# Patient Record
Sex: MALE | Race: WHITE | Employment: UNEMPLOYED | URBAN - METROPOLITAN AREA
[De-identification: names, ages, dates, MRNs, and addresses within clinical notes are randomized per-mention and may not be internally consistent; named-entity substitution may affect disease eponyms.]

---

## 2019-02-06 ENCOUNTER — HOSPITAL ENCOUNTER (OUTPATIENT)
Age: 54
Setting detail: SPECIMEN
Discharge: HOME | End: 2019-02-06
Payer: COMMERCIAL

## 2019-02-06 LAB
ALBUMIN SERPL-MCNC: 4.4 GM/DL (ref 3.4–5)
ALP BLD-CCNC: 128 IU/L (ref 40–128)
ALT SERPL-CCNC: 14 U/L (ref 10–40)
ANION GAP SERPL CALCULATED.3IONS-SCNC: 13 MMOL/L (ref 4–16)
AST SERPL-CCNC: 17 IU/L (ref 15–37)
BASOPHILS ABSOLUTE: 0.1 K/CU MM
BASOPHILS RELATIVE PERCENT: 1.7 % (ref 0–1)
BILIRUB SERPL-MCNC: 0.2 MG/DL (ref 0–1)
BUN BLDV-MCNC: 18 MG/DL (ref 6–23)
CALCIUM SERPL-MCNC: 9.4 MG/DL (ref 8.3–10.6)
CHLORIDE BLD-SCNC: 106 MMOL/L (ref 99–110)
CHOLESTEROL: 137 MG/DL
CO2: 26 MMOL/L (ref 21–32)
CREAT SERPL-MCNC: 1 MG/DL (ref 0.9–1.3)
DIFFERENTIAL TYPE: ABNORMAL
EOSINOPHILS ABSOLUTE: 1 K/CU MM
EOSINOPHILS RELATIVE PERCENT: 12.6 % (ref 0–3)
GFR AFRICAN AMERICAN: >60 ML/MIN/1.73M2
GFR NON-AFRICAN AMERICAN: >60 ML/MIN/1.73M2
GLUCOSE BLD-MCNC: 75 MG/DL (ref 70–99)
HCT VFR BLD CALC: 49.4 % (ref 42–52)
HDLC SERPL-MCNC: 50 MG/DL
HEMOGLOBIN: 16 GM/DL (ref 13.5–18)
IMMATURE NEUTROPHIL %: 0.3 % (ref 0–0.43)
LDL CHOLESTEROL DIRECT: 85 MG/DL
LYMPHOCYTES ABSOLUTE: 2.7 K/CU MM
LYMPHOCYTES RELATIVE PERCENT: 36 % (ref 24–44)
MCH RBC QN AUTO: 34 PG (ref 27–31)
MCHC RBC AUTO-ENTMCNC: 32.4 % (ref 32–36)
MCV RBC AUTO: 104.9 FL (ref 78–100)
MONOCYTES ABSOLUTE: 0.9 K/CU MM
MONOCYTES RELATIVE PERCENT: 12.5 % (ref 0–4)
NUCLEATED RBC %: 0 %
PDW BLD-RTO: 11.4 % (ref 11.7–14.9)
PLATELET # BLD: 256 K/CU MM (ref 140–440)
PMV BLD AUTO: 9.4 FL (ref 7.5–11.1)
POTASSIUM SERPL-SCNC: 4.3 MMOL/L (ref 3.5–5.1)
RBC # BLD: 4.71 M/CU MM (ref 4.6–6.2)
SEGMENTED NEUTROPHILS ABSOLUTE COUNT: 2.8 K/CU MM
SEGMENTED NEUTROPHILS RELATIVE PERCENT: 36.9 % (ref 36–66)
SODIUM BLD-SCNC: 145 MMOL/L (ref 135–145)
TOTAL CK: 75 IU/L (ref 38–174)
TOTAL IMMATURE NEUTOROPHIL: 0.02 K/CU MM
TOTAL NUCLEATED RBC: 0 K/CU MM
TOTAL PROTEIN: 6.8 GM/DL (ref 6.4–8.2)
TRIGL SERPL-MCNC: 106 MG/DL
WBC # BLD: 7.5 K/CU MM (ref 4–10.5)

## 2019-02-06 PROCEDURE — 82550 ASSAY OF CK (CPK): CPT

## 2019-02-06 PROCEDURE — 83721 ASSAY OF BLOOD LIPOPROTEIN: CPT

## 2019-02-06 PROCEDURE — 36415 COLL VENOUS BLD VENIPUNCTURE: CPT

## 2019-02-06 PROCEDURE — 80061 LIPID PANEL: CPT

## 2019-02-06 PROCEDURE — 85025 COMPLETE CBC W/AUTO DIFF WBC: CPT

## 2019-02-06 PROCEDURE — 80053 COMPREHEN METABOLIC PANEL: CPT

## 2019-02-17 ENCOUNTER — HOSPITAL ENCOUNTER (INPATIENT)
Age: 54
LOS: 3 days | Discharge: HOME HEALTH | DRG: 300 | End: 2019-02-20
Attending: EMERGENCY MEDICINE | Admitting: INTERNAL MEDICINE
Payer: MEDICARE

## 2019-02-17 ENCOUNTER — APPOINTMENT (OUTPATIENT)
Dept: ULTRASOUND IMAGING | Age: 54
DRG: 300 | End: 2019-02-17
Payer: MEDICARE

## 2019-02-17 DIAGNOSIS — I82.403 LEG DVT (DEEP VENOUS THROMBOEMBOLISM), ACUTE, BILATERAL (HCC): Primary | ICD-10-CM

## 2019-02-17 PROBLEM — I82.433 ACUTE DEEP VEIN THROMBOSIS (DVT) OF POPLITEAL VEIN OF BOTH LOWER EXTREMITIES (HCC): Status: ACTIVE | Noted: 2019-02-17

## 2019-02-17 PROBLEM — I82.4Y3 DVT, LOWER EXTREMITY, PROXIMAL, ACUTE, BILATERAL (HCC): Status: ACTIVE | Noted: 2019-02-17

## 2019-02-17 LAB
ALBUMIN SERPL-MCNC: 3.7 GM/DL (ref 3.4–5)
ALP BLD-CCNC: 164 IU/L (ref 40–129)
ALT SERPL-CCNC: 23 U/L (ref 10–40)
ANION GAP SERPL CALCULATED.3IONS-SCNC: 12 MMOL/L (ref 4–16)
APTT: 136.4 SECONDS (ref 21.2–33)
APTT: 31.3 SECONDS (ref 21.2–33)
AST SERPL-CCNC: 24 IU/L (ref 15–37)
BASOPHILS ABSOLUTE: 0.1 K/CU MM
BASOPHILS RELATIVE PERCENT: 0.8 % (ref 0–1)
BILIRUB SERPL-MCNC: 0.2 MG/DL (ref 0–1)
BUN BLDV-MCNC: 16 MG/DL (ref 6–23)
CALCIUM SERPL-MCNC: 8.6 MG/DL (ref 8.3–10.6)
CHLORIDE BLD-SCNC: 100 MMOL/L (ref 99–110)
CO2: 23 MMOL/L (ref 21–32)
CREAT SERPL-MCNC: 0.8 MG/DL (ref 0.9–1.3)
DIFFERENTIAL TYPE: ABNORMAL
EOSINOPHILS ABSOLUTE: 0.6 K/CU MM
EOSINOPHILS RELATIVE PERCENT: 7.2 % (ref 0–3)
GFR AFRICAN AMERICAN: >60 ML/MIN/1.73M2
GFR NON-AFRICAN AMERICAN: >60 ML/MIN/1.73M2
GLUCOSE BLD-MCNC: 96 MG/DL (ref 70–99)
HCT VFR BLD CALC: 42.1 % (ref 42–52)
HEMOGLOBIN: 14.1 GM/DL (ref 13.5–18)
IMMATURE NEUTROPHIL %: 0.3 % (ref 0–0.43)
INR BLD: 1.06 INDEX
LYMPHOCYTES ABSOLUTE: 3 K/CU MM
LYMPHOCYTES RELATIVE PERCENT: 34.1 % (ref 24–44)
MCH RBC QN AUTO: 33.4 PG (ref 27–31)
MCHC RBC AUTO-ENTMCNC: 33.5 % (ref 32–36)
MCV RBC AUTO: 99.8 FL (ref 78–100)
MONOCYTES ABSOLUTE: 1 K/CU MM
MONOCYTES RELATIVE PERCENT: 11.5 % (ref 0–4)
NUCLEATED RBC %: 0 %
PDW BLD-RTO: 11.4 % (ref 11.7–14.9)
PLATELET # BLD: 244 K/CU MM (ref 140–440)
PMV BLD AUTO: 9 FL (ref 7.5–11.1)
POTASSIUM SERPL-SCNC: 3.9 MMOL/L (ref 3.5–5.1)
PROTHROMBIN TIME: 12.1 SECONDS (ref 9.12–12.5)
RBC # BLD: 4.22 M/CU MM (ref 4.6–6.2)
SEGMENTED NEUTROPHILS ABSOLUTE COUNT: 4 K/CU MM
SEGMENTED NEUTROPHILS RELATIVE PERCENT: 46.1 % (ref 36–66)
SODIUM BLD-SCNC: 135 MMOL/L (ref 135–145)
TOTAL IMMATURE NEUTOROPHIL: 0.03 K/CU MM
TOTAL NUCLEATED RBC: 0 K/CU MM
TOTAL PROTEIN: 7.1 GM/DL (ref 6.4–8.2)
WBC # BLD: 8.7 K/CU MM (ref 4–10.5)

## 2019-02-17 PROCEDURE — 99285 EMERGENCY DEPT VISIT HI MDM: CPT

## 2019-02-17 PROCEDURE — 85025 COMPLETE CBC W/AUTO DIFF WBC: CPT

## 2019-02-17 PROCEDURE — 1200000000 HC SEMI PRIVATE

## 2019-02-17 PROCEDURE — 96365 THER/PROPH/DIAG IV INF INIT: CPT

## 2019-02-17 PROCEDURE — 96366 THER/PROPH/DIAG IV INF ADDON: CPT

## 2019-02-17 PROCEDURE — 85730 THROMBOPLASTIN TIME PARTIAL: CPT

## 2019-02-17 PROCEDURE — 94761 N-INVAS EAR/PLS OXIMETRY MLT: CPT

## 2019-02-17 PROCEDURE — 2580000003 HC RX 258: Performed by: INTERNAL MEDICINE

## 2019-02-17 PROCEDURE — 93970 EXTREMITY STUDY: CPT

## 2019-02-17 PROCEDURE — G0378 HOSPITAL OBSERVATION PER HR: HCPCS

## 2019-02-17 PROCEDURE — 85610 PROTHROMBIN TIME: CPT

## 2019-02-17 PROCEDURE — 36415 COLL VENOUS BLD VENIPUNCTURE: CPT

## 2019-02-17 PROCEDURE — 6360000002 HC RX W HCPCS: Performed by: EMERGENCY MEDICINE

## 2019-02-17 PROCEDURE — 6370000000 HC RX 637 (ALT 250 FOR IP): Performed by: INTERNAL MEDICINE

## 2019-02-17 PROCEDURE — 80053 COMPREHEN METABOLIC PANEL: CPT

## 2019-02-17 RX ORDER — M-VIT,TX,IRON,MINS/CALC/FOLIC 27MG-0.4MG
1 TABLET ORAL DAILY
Status: DISCONTINUED | OUTPATIENT
Start: 2019-02-18 | End: 2019-02-20 | Stop reason: HOSPADM

## 2019-02-17 RX ORDER — SODIUM CHLORIDE 0.9 % (FLUSH) 0.9 %
10 SYRINGE (ML) INJECTION EVERY 12 HOURS SCHEDULED
Status: DISCONTINUED | OUTPATIENT
Start: 2019-02-17 | End: 2019-02-20 | Stop reason: HOSPADM

## 2019-02-17 RX ORDER — CLONAZEPAM 0.5 MG/1
0.5 TABLET ORAL 4 TIMES DAILY PRN
Status: DISCONTINUED | OUTPATIENT
Start: 2019-02-17 | End: 2019-02-20 | Stop reason: HOSPADM

## 2019-02-17 RX ORDER — BUSPIRONE HYDROCHLORIDE 7.5 MG/1
15 TABLET ORAL 3 TIMES DAILY
Status: DISCONTINUED | OUTPATIENT
Start: 2019-02-17 | End: 2019-02-20

## 2019-02-17 RX ORDER — GEMFIBROZIL 600 MG/1
600 TABLET, FILM COATED ORAL
Status: DISCONTINUED | OUTPATIENT
Start: 2019-02-17 | End: 2019-02-20 | Stop reason: HOSPADM

## 2019-02-17 RX ORDER — PHENYTOIN 125 MG/5ML
100 SUSPENSION ORAL 2 TIMES DAILY
Status: DISCONTINUED | OUTPATIENT
Start: 2019-02-17 | End: 2019-02-20

## 2019-02-17 RX ORDER — ONDANSETRON 2 MG/ML
4 INJECTION INTRAMUSCULAR; INTRAVENOUS EVERY 6 HOURS PRN
Status: DISCONTINUED | OUTPATIENT
Start: 2019-02-17 | End: 2019-02-20 | Stop reason: HOSPADM

## 2019-02-17 RX ORDER — LAMOTRIGINE 100 MG/1
100 TABLET ORAL DAILY
Status: DISCONTINUED | OUTPATIENT
Start: 2019-02-18 | End: 2019-02-20 | Stop reason: HOSPADM

## 2019-02-17 RX ORDER — FAMOTIDINE 20 MG/1
20 TABLET, FILM COATED ORAL DAILY
Status: DISCONTINUED | OUTPATIENT
Start: 2019-02-18 | End: 2019-02-20 | Stop reason: HOSPADM

## 2019-02-17 RX ORDER — HEPARIN SODIUM 1000 [USP'U]/ML
40 INJECTION, SOLUTION INTRAVENOUS; SUBCUTANEOUS PRN
Status: DISCONTINUED | OUTPATIENT
Start: 2019-02-18 | End: 2019-02-20

## 2019-02-17 RX ORDER — PROPRANOLOL HYDROCHLORIDE 80 MG/1
80 CAPSULE, EXTENDED RELEASE ORAL DAILY
Status: DISCONTINUED | OUTPATIENT
Start: 2019-02-18 | End: 2019-02-20 | Stop reason: HOSPADM

## 2019-02-17 RX ORDER — HEPARIN SODIUM 10000 [USP'U]/100ML
18 INJECTION, SOLUTION INTRAVENOUS CONTINUOUS
Status: DISCONTINUED | OUTPATIENT
Start: 2019-02-17 | End: 2019-02-20

## 2019-02-17 RX ORDER — HEPARIN SODIUM 1000 [USP'U]/ML
80 INJECTION, SOLUTION INTRAVENOUS; SUBCUTANEOUS ONCE
Status: COMPLETED | OUTPATIENT
Start: 2019-02-17 | End: 2019-02-17

## 2019-02-17 RX ORDER — CLOPIDOGREL BISULFATE 75 MG/1
75 TABLET ORAL DAILY
Status: DISCONTINUED | OUTPATIENT
Start: 2019-02-18 | End: 2019-02-20 | Stop reason: HOSPADM

## 2019-02-17 RX ORDER — FLUVOXAMINE MALEATE 50 MG/1
100 TABLET, COATED ORAL 2 TIMES DAILY
Status: DISCONTINUED | OUTPATIENT
Start: 2019-02-17 | End: 2019-02-20

## 2019-02-17 RX ORDER — SODIUM CHLORIDE 0.9 % (FLUSH) 0.9 %
10 SYRINGE (ML) INJECTION PRN
Status: DISCONTINUED | OUTPATIENT
Start: 2019-02-17 | End: 2019-02-20 | Stop reason: HOSPADM

## 2019-02-17 RX ORDER — WARFARIN SODIUM 2.5 MG/1
2.5 TABLET ORAL DAILY
Status: DISCONTINUED | OUTPATIENT
Start: 2019-02-17 | End: 2019-02-18

## 2019-02-17 RX ORDER — NICOTINE 21 MG/24HR
1 PATCH, TRANSDERMAL 24 HOURS TRANSDERMAL DAILY
Status: DISCONTINUED | OUTPATIENT
Start: 2019-02-17 | End: 2019-02-20 | Stop reason: HOSPADM

## 2019-02-17 RX ORDER — HEPARIN SODIUM 1000 [USP'U]/ML
80 INJECTION, SOLUTION INTRAVENOUS; SUBCUTANEOUS PRN
Status: DISCONTINUED | OUTPATIENT
Start: 2019-02-18 | End: 2019-02-20

## 2019-02-17 RX ADMIN — BUSPIRONE HYDROCHLORIDE 15 MG: 7.5 TABLET ORAL at 20:58

## 2019-02-17 RX ADMIN — HEPARIN SODIUM AND DEXTROSE 18 UNITS/KG/HR: 10000; 5 INJECTION INTRAVENOUS at 16:26

## 2019-02-17 RX ADMIN — WARFARIN SODIUM 2.5 MG: 2.5 TABLET ORAL at 18:28

## 2019-02-17 RX ADMIN — FLUVOXAMINE MALEATE 100 MG: 50 TABLET, FILM COATED ORAL at 20:58

## 2019-02-17 RX ADMIN — GEMFIBROZIL 600 MG: 600 TABLET ORAL at 18:28

## 2019-02-17 RX ADMIN — SODIUM CHLORIDE, PRESERVATIVE FREE 10 ML: 5 INJECTION INTRAVENOUS at 20:58

## 2019-02-17 RX ADMIN — HEPARIN SODIUM 7440 UNITS: 1000 INJECTION, SOLUTION INTRAVENOUS; SUBCUTANEOUS at 16:26

## 2019-02-17 RX ADMIN — PHENYTOIN 100 MG: 125 SUSPENSION ORAL at 20:58

## 2019-02-17 ASSESSMENT — PAIN DESCRIPTION - LOCATION: LOCATION: FOOT

## 2019-02-17 ASSESSMENT — PAIN SCALES - GENERAL
PAINLEVEL_OUTOF10: 0
PAINLEVEL_OUTOF10: 7
PAINLEVEL_OUTOF10: 0

## 2019-02-17 ASSESSMENT — PAIN DESCRIPTION - ORIENTATION: ORIENTATION: RIGHT;LEFT

## 2019-02-18 LAB
ANION GAP SERPL CALCULATED.3IONS-SCNC: 12 MMOL/L (ref 4–16)
APTT: 79.5 SECONDS (ref 21.2–33)
APTT: 86 SECONDS (ref 21.2–33)
APTT: 89 SECONDS (ref 21.2–33)
BASOPHILS ABSOLUTE: 0.1 K/CU MM
BASOPHILS RELATIVE PERCENT: 1 % (ref 0–1)
BUN BLDV-MCNC: 16 MG/DL (ref 6–23)
CALCIUM SERPL-MCNC: 8.3 MG/DL (ref 8.3–10.6)
CHLORIDE BLD-SCNC: 100 MMOL/L (ref 99–110)
CO2: 24 MMOL/L (ref 21–32)
CREAT SERPL-MCNC: 0.9 MG/DL (ref 0.9–1.3)
DIFFERENTIAL TYPE: ABNORMAL
EOSINOPHILS ABSOLUTE: 0.7 K/CU MM
EOSINOPHILS RELATIVE PERCENT: 8.7 % (ref 0–3)
FIBRINOGEN LEVEL: 504 MG/DL (ref 196.9–442.1)
GFR AFRICAN AMERICAN: >60 ML/MIN/1.73M2
GFR NON-AFRICAN AMERICAN: >60 ML/MIN/1.73M2
GLUCOSE BLD-MCNC: 96 MG/DL (ref 70–99)
HCT VFR BLD CALC: 38.6 % (ref 42–52)
HEMOGLOBIN: 13.4 GM/DL (ref 13.5–18)
HOMOCYSTEINE: 9 UMOL/L (ref 0–10)
IMMATURE NEUTROPHIL %: 0.2 % (ref 0–0.43)
INR BLD: 1.08 INDEX
INR BLD: 1.09 INDEX
LYMPHOCYTES ABSOLUTE: 3.6 K/CU MM
LYMPHOCYTES RELATIVE PERCENT: 42.7 % (ref 24–44)
MAGNESIUM: 2 MG/DL (ref 1.8–2.4)
MCH RBC QN AUTO: 33.3 PG (ref 27–31)
MCHC RBC AUTO-ENTMCNC: 34.7 % (ref 32–36)
MCV RBC AUTO: 96 FL (ref 78–100)
MONOCYTES ABSOLUTE: 0.8 K/CU MM
MONOCYTES RELATIVE PERCENT: 9.4 % (ref 0–4)
NUCLEATED RBC %: 0 %
PDW BLD-RTO: 11.2 % (ref 11.7–14.9)
PLATELET # BLD: 263 K/CU MM (ref 140–440)
PMV BLD AUTO: 9.2 FL (ref 7.5–11.1)
POTASSIUM SERPL-SCNC: 3.4 MMOL/L (ref 3.5–5.1)
PROTHROMBIN TIME: 12.3 SECONDS (ref 9.12–12.5)
PROTHROMBIN TIME: 12.4 SECONDS (ref 9.12–12.5)
RBC # BLD: 4.02 M/CU MM (ref 4.6–6.2)
SEGMENTED NEUTROPHILS ABSOLUTE COUNT: 3.2 K/CU MM
SEGMENTED NEUTROPHILS RELATIVE PERCENT: 38 % (ref 36–66)
SODIUM BLD-SCNC: 136 MMOL/L (ref 135–145)
TOTAL IMMATURE NEUTOROPHIL: 0.02 K/CU MM
TOTAL NUCLEATED RBC: 0 K/CU MM
WBC # BLD: 8.4 K/CU MM (ref 4–10.5)

## 2019-02-18 PROCEDURE — 85300 ANTITHROMBIN III ACTIVITY: CPT

## 2019-02-18 PROCEDURE — 36415 COLL VENOUS BLD VENIPUNCTURE: CPT

## 2019-02-18 PROCEDURE — 80048 BASIC METABOLIC PNL TOTAL CA: CPT

## 2019-02-18 PROCEDURE — 81241 F5 GENE: CPT

## 2019-02-18 PROCEDURE — 83090 ASSAY OF HOMOCYSTEINE: CPT

## 2019-02-18 PROCEDURE — 1200000000 HC SEMI PRIVATE

## 2019-02-18 PROCEDURE — G0378 HOSPITAL OBSERVATION PER HR: HCPCS

## 2019-02-18 PROCEDURE — 85240 CLOT FACTOR VIII AHG 1 STAGE: CPT

## 2019-02-18 PROCEDURE — 6360000002 HC RX W HCPCS: Performed by: EMERGENCY MEDICINE

## 2019-02-18 PROCEDURE — 6370000000 HC RX 637 (ALT 250 FOR IP): Performed by: INTERNAL MEDICINE

## 2019-02-18 PROCEDURE — 85025 COMPLETE CBC W/AUTO DIFF WBC: CPT

## 2019-02-18 PROCEDURE — 81291 MTHFR GENE: CPT

## 2019-02-18 PROCEDURE — 85303 CLOT INHIBIT PROT C ACTIVITY: CPT

## 2019-02-18 PROCEDURE — 85610 PROTHROMBIN TIME: CPT

## 2019-02-18 PROCEDURE — 81240 F2 GENE: CPT

## 2019-02-18 PROCEDURE — 85730 THROMBOPLASTIN TIME PARTIAL: CPT

## 2019-02-18 PROCEDURE — 85305 CLOT INHIBIT PROT S TOTAL: CPT

## 2019-02-18 PROCEDURE — 86148 ANTI-PHOSPHOLIPID ANTIBODY: CPT

## 2019-02-18 PROCEDURE — 83735 ASSAY OF MAGNESIUM: CPT

## 2019-02-18 PROCEDURE — 85384 FIBRINOGEN ACTIVITY: CPT

## 2019-02-18 PROCEDURE — 96366 THER/PROPH/DIAG IV INF ADDON: CPT

## 2019-02-18 PROCEDURE — 86147 CARDIOLIPIN ANTIBODY EA IG: CPT

## 2019-02-18 RX ORDER — WARFARIN SODIUM 7.5 MG/1
7.5 TABLET ORAL DAILY
Status: DISCONTINUED | OUTPATIENT
Start: 2019-02-18 | End: 2019-02-20

## 2019-02-18 RX ORDER — POTASSIUM CHLORIDE 750 MG/1
40 TABLET, FILM COATED, EXTENDED RELEASE ORAL ONCE
Status: COMPLETED | OUTPATIENT
Start: 2019-02-18 | End: 2019-02-18

## 2019-02-18 RX ADMIN — GEMFIBROZIL 600 MG: 600 TABLET ORAL at 07:33

## 2019-02-18 RX ADMIN — PROPRANOLOL HYDROCHLORIDE 80 MG: 80 CAPSULE, EXTENDED RELEASE ORAL at 10:47

## 2019-02-18 RX ADMIN — FLUVOXAMINE MALEATE 100 MG: 50 TABLET, FILM COATED ORAL at 10:47

## 2019-02-18 RX ADMIN — PHENYTOIN 100 MG: 125 SUSPENSION ORAL at 22:20

## 2019-02-18 RX ADMIN — WARFARIN SODIUM 7.5 MG: 7.5 TABLET ORAL at 17:59

## 2019-02-18 RX ADMIN — BUSPIRONE HYDROCHLORIDE 15 MG: 7.5 TABLET ORAL at 10:46

## 2019-02-18 RX ADMIN — MULTIPLE VITAMINS W/ MINERALS TAB 1 TABLET: TAB at 10:47

## 2019-02-18 RX ADMIN — HEPARIN SODIUM AND DEXTROSE 15.05 UNITS/KG/HR: 10000; 5 INJECTION INTRAVENOUS at 23:32

## 2019-02-18 RX ADMIN — HEPARIN SODIUM AND DEXTROSE 15 UNITS/KG/HR: 10000; 5 INJECTION INTRAVENOUS at 07:03

## 2019-02-18 RX ADMIN — LAMOTRIGINE 100 MG: 100 TABLET ORAL at 10:47

## 2019-02-18 RX ADMIN — FAMOTIDINE 20 MG: 20 TABLET, FILM COATED ORAL at 10:46

## 2019-02-18 RX ADMIN — CLOPIDOGREL BISULFATE 75 MG: 75 TABLET, FILM COATED ORAL at 10:47

## 2019-02-18 RX ADMIN — POTASSIUM CHLORIDE 40 MEQ: 750 TABLET, FILM COATED, EXTENDED RELEASE ORAL at 10:47

## 2019-02-18 RX ADMIN — GEMFIBROZIL 600 MG: 600 TABLET ORAL at 15:25

## 2019-02-18 RX ADMIN — BUSPIRONE HYDROCHLORIDE 15 MG: 7.5 TABLET ORAL at 15:25

## 2019-02-18 RX ADMIN — PHENYTOIN 100 MG: 125 SUSPENSION ORAL at 10:46

## 2019-02-18 RX ADMIN — BUSPIRONE HYDROCHLORIDE 15 MG: 7.5 TABLET ORAL at 22:20

## 2019-02-18 ASSESSMENT — PAIN SCALES - GENERAL: PAINLEVEL_OUTOF10: 0

## 2019-02-19 LAB
APTT: 60.2 SECONDS (ref 21.2–33)
INR BLD: 1.14 INDEX
PHENYTOIN LEVEL: 5.2 UG/ML (ref 10–20)
PLATELET # BLD: 288 K/CU MM (ref 140–440)
PROTHROMBIN TIME: 13.2 SECONDS (ref 9.12–12.5)

## 2019-02-19 PROCEDURE — 6360000002 HC RX W HCPCS: Performed by: EMERGENCY MEDICINE

## 2019-02-19 PROCEDURE — 6360000002 HC RX W HCPCS

## 2019-02-19 PROCEDURE — 96366 THER/PROPH/DIAG IV INF ADDON: CPT

## 2019-02-19 PROCEDURE — 6370000000 HC RX 637 (ALT 250 FOR IP): Performed by: HOSPITALIST

## 2019-02-19 PROCEDURE — 1200000000 HC SEMI PRIVATE

## 2019-02-19 PROCEDURE — 94761 N-INVAS EAR/PLS OXIMETRY MLT: CPT

## 2019-02-19 PROCEDURE — 6370000000 HC RX 637 (ALT 250 FOR IP): Performed by: INTERNAL MEDICINE

## 2019-02-19 PROCEDURE — 85049 AUTOMATED PLATELET COUNT: CPT

## 2019-02-19 PROCEDURE — 85610 PROTHROMBIN TIME: CPT

## 2019-02-19 PROCEDURE — 80185 ASSAY OF PHENYTOIN TOTAL: CPT

## 2019-02-19 PROCEDURE — 36415 COLL VENOUS BLD VENIPUNCTURE: CPT

## 2019-02-19 PROCEDURE — G0378 HOSPITAL OBSERVATION PER HR: HCPCS

## 2019-02-19 PROCEDURE — 85730 THROMBOPLASTIN TIME PARTIAL: CPT

## 2019-02-19 RX ORDER — LORAZEPAM 2 MG/ML
1 INJECTION INTRAMUSCULAR EVERY 4 HOURS PRN
Status: DISCONTINUED | OUTPATIENT
Start: 2019-02-19 | End: 2019-02-20 | Stop reason: HOSPADM

## 2019-02-19 RX ORDER — LORAZEPAM 2 MG/ML
INJECTION INTRAMUSCULAR
Status: COMPLETED
Start: 2019-02-19 | End: 2019-02-19

## 2019-02-19 RX ORDER — LEVETIRACETAM 500 MG/1
500 TABLET ORAL 2 TIMES DAILY
Status: DISCONTINUED | OUTPATIENT
Start: 2019-02-19 | End: 2019-02-20

## 2019-02-19 RX ADMIN — CLOPIDOGREL BISULFATE 75 MG: 75 TABLET, FILM COATED ORAL at 09:29

## 2019-02-19 RX ADMIN — FLUVOXAMINE MALEATE 100 MG: 50 TABLET, FILM COATED ORAL at 09:33

## 2019-02-19 RX ADMIN — PROPRANOLOL HYDROCHLORIDE 80 MG: 80 CAPSULE, EXTENDED RELEASE ORAL at 09:32

## 2019-02-19 RX ADMIN — LEVETIRACETAM 500 MG: 500 TABLET ORAL at 23:55

## 2019-02-19 RX ADMIN — LORAZEPAM 1 MG: 2 INJECTION INTRAMUSCULAR; INTRAVENOUS at 20:47

## 2019-02-19 RX ADMIN — FLUVOXAMINE MALEATE 100 MG: 50 TABLET, FILM COATED ORAL at 21:08

## 2019-02-19 RX ADMIN — MULTIPLE VITAMINS W/ MINERALS TAB 1 TABLET: TAB at 09:29

## 2019-02-19 RX ADMIN — LORAZEPAM 1 MG: 2 INJECTION, SOLUTION INTRAMUSCULAR; INTRAVENOUS at 20:47

## 2019-02-19 RX ADMIN — FLUVOXAMINE MALEATE 100 MG: 50 TABLET, FILM COATED ORAL at 00:24

## 2019-02-19 RX ADMIN — FAMOTIDINE 20 MG: 20 TABLET, FILM COATED ORAL at 09:29

## 2019-02-19 RX ADMIN — PHENYTOIN 100 MG: 125 SUSPENSION ORAL at 09:32

## 2019-02-19 RX ADMIN — LAMOTRIGINE 100 MG: 100 TABLET ORAL at 09:29

## 2019-02-19 RX ADMIN — GEMFIBROZIL 600 MG: 600 TABLET ORAL at 17:08

## 2019-02-19 RX ADMIN — BUSPIRONE HYDROCHLORIDE 15 MG: 7.5 TABLET ORAL at 14:53

## 2019-02-19 RX ADMIN — WARFARIN SODIUM 7.5 MG: 7.5 TABLET ORAL at 17:08

## 2019-02-19 RX ADMIN — GEMFIBROZIL 600 MG: 600 TABLET ORAL at 05:28

## 2019-02-19 RX ADMIN — HEPARIN SODIUM AND DEXTROSE 16.99 UNITS/KG/HR: 10000; 5 INJECTION INTRAVENOUS at 17:09

## 2019-02-19 RX ADMIN — HEPARIN SODIUM AND DEXTROSE 17 UNITS/KG/HR: 10000; 5 INJECTION INTRAVENOUS at 04:33

## 2019-02-19 RX ADMIN — BUSPIRONE HYDROCHLORIDE 15 MG: 7.5 TABLET ORAL at 21:07

## 2019-02-19 RX ADMIN — PHENYTOIN 100 MG: 125 SUSPENSION ORAL at 21:08

## 2019-02-19 RX ADMIN — BUSPIRONE HYDROCHLORIDE 15 MG: 7.5 TABLET ORAL at 09:29

## 2019-02-19 ASSESSMENT — PAIN DESCRIPTION - ORIENTATION
ORIENTATION: RIGHT
ORIENTATION: RIGHT

## 2019-02-19 ASSESSMENT — PAIN DESCRIPTION - LOCATION
LOCATION: LEG
LOCATION: LEG

## 2019-02-19 ASSESSMENT — PAIN SCALES - GENERAL
PAINLEVEL_OUTOF10: 0
PAINLEVEL_OUTOF10: 6

## 2019-02-20 VITALS
SYSTOLIC BLOOD PRESSURE: 117 MMHG | WEIGHT: 206.8 LBS | RESPIRATION RATE: 15 BRPM | TEMPERATURE: 98 F | HEART RATE: 82 BPM | HEIGHT: 72 IN | BODY MASS INDEX: 28.01 KG/M2 | OXYGEN SATURATION: 90 % | DIASTOLIC BLOOD PRESSURE: 79 MMHG

## 2019-02-20 LAB
APTT: 96.1 SECONDS (ref 21.2–33)
FOLATE: >20 NG/ML (ref 3.1–17.5)
INR BLD: 2.51 INDEX
PHENYTOIN LEVEL: 5.5 UG/ML (ref 10–20)
PROTHROMBIN TIME: 28.4 SECONDS (ref 9.12–12.5)
TSH HIGH SENSITIVITY: 2.12 UIU/ML (ref 0.27–4.2)
VITAMIN B-12: 528.3 PG/ML (ref 211–911)

## 2019-02-20 PROCEDURE — 80185 ASSAY OF PHENYTOIN TOTAL: CPT

## 2019-02-20 PROCEDURE — 85610 PROTHROMBIN TIME: CPT

## 2019-02-20 PROCEDURE — 82607 VITAMIN B-12: CPT

## 2019-02-20 PROCEDURE — 36415 COLL VENOUS BLD VENIPUNCTURE: CPT

## 2019-02-20 PROCEDURE — 6370000000 HC RX 637 (ALT 250 FOR IP): Performed by: HOSPITALIST

## 2019-02-20 PROCEDURE — 85730 THROMBOPLASTIN TIME PARTIAL: CPT

## 2019-02-20 PROCEDURE — 6370000000 HC RX 637 (ALT 250 FOR IP): Performed by: INTERNAL MEDICINE

## 2019-02-20 PROCEDURE — G0378 HOSPITAL OBSERVATION PER HR: HCPCS

## 2019-02-20 PROCEDURE — 84443 ASSAY THYROID STIM HORMONE: CPT

## 2019-02-20 PROCEDURE — 2580000003 HC RX 258: Performed by: INTERNAL MEDICINE

## 2019-02-20 PROCEDURE — 82746 ASSAY OF FOLIC ACID SERUM: CPT

## 2019-02-20 RX ORDER — PHENYTOIN 125 MG/5ML
100 SUSPENSION ORAL 3 TIMES DAILY
Status: DISCONTINUED | OUTPATIENT
Start: 2019-02-20 | End: 2019-02-20

## 2019-02-20 RX ORDER — BUSPIRONE HYDROCHLORIDE 10 MG/1
20 TABLET ORAL 3 TIMES DAILY
Status: DISCONTINUED | OUTPATIENT
Start: 2019-02-20 | End: 2019-02-20 | Stop reason: RX

## 2019-02-20 RX ORDER — WARFARIN SODIUM 5 MG/1
5 TABLET ORAL DAILY
Qty: 30 TABLET | Refills: 3 | Status: SHIPPED | OUTPATIENT
Start: 2019-02-20 | End: 2021-03-15

## 2019-02-20 RX ORDER — LEVETIRACETAM 500 MG/1
500 TABLET ORAL 2 TIMES DAILY
Qty: 60 TABLET | Refills: 3 | Status: SHIPPED | OUTPATIENT
Start: 2019-02-20 | End: 2021-03-15

## 2019-02-20 RX ORDER — PHENYTOIN 125 MG/5ML
100 SUSPENSION ORAL 3 TIMES DAILY
Qty: 1 BOTTLE | Refills: 3 | Status: SHIPPED | OUTPATIENT
Start: 2019-02-20 | End: 2021-03-15 | Stop reason: DRUGHIGH

## 2019-02-20 RX ORDER — NICOTINE 21 MG/24HR
1 PATCH, TRANSDERMAL 24 HOURS TRANSDERMAL DAILY
Qty: 30 PATCH | Refills: 3 | Status: SHIPPED | OUTPATIENT
Start: 2019-02-20 | End: 2021-03-15

## 2019-02-20 RX ORDER — PHENYTOIN 125 MG/5ML
200 SUSPENSION ORAL 2 TIMES DAILY
Status: DISCONTINUED | OUTPATIENT
Start: 2019-02-20 | End: 2019-02-20 | Stop reason: HOSPADM

## 2019-02-20 RX ORDER — BUSPIRONE HYDROCHLORIDE 7.5 MG/1
22.5 TABLET ORAL 3 TIMES DAILY
Status: DISCONTINUED | OUTPATIENT
Start: 2019-02-20 | End: 2019-02-20 | Stop reason: HOSPADM

## 2019-02-20 RX ORDER — WARFARIN SODIUM 5 MG/1
5 TABLET ORAL DAILY
Status: DISCONTINUED | OUTPATIENT
Start: 2019-02-20 | End: 2019-02-20 | Stop reason: HOSPADM

## 2019-02-20 RX ORDER — PAROXETINE 10 MG/1
10 TABLET, FILM COATED ORAL DAILY
Status: DISCONTINUED | OUTPATIENT
Start: 2019-02-20 | End: 2019-02-20

## 2019-02-20 RX ORDER — LEVETIRACETAM 500 MG/1
1000 TABLET ORAL 2 TIMES DAILY
Status: DISCONTINUED | OUTPATIENT
Start: 2019-02-20 | End: 2019-02-20 | Stop reason: HOSPADM

## 2019-02-20 RX ORDER — FLUVOXAMINE MALEATE 50 MG/1
125 TABLET, COATED ORAL 2 TIMES DAILY
Status: DISCONTINUED | OUTPATIENT
Start: 2019-02-20 | End: 2019-02-20 | Stop reason: HOSPADM

## 2019-02-20 RX ADMIN — FLUVOXAMINE MALEATE 100 MG: 50 TABLET, FILM COATED ORAL at 10:24

## 2019-02-20 RX ADMIN — SODIUM CHLORIDE, PRESERVATIVE FREE 10 ML: 5 INJECTION INTRAVENOUS at 10:24

## 2019-02-20 RX ADMIN — BUSPIRONE HYDROCHLORIDE 15 MG: 7.5 TABLET ORAL at 10:23

## 2019-02-20 RX ADMIN — PHENYTOIN 100 MG: 125 SUSPENSION ORAL at 10:24

## 2019-02-20 RX ADMIN — GEMFIBROZIL 600 MG: 600 TABLET ORAL at 06:35

## 2019-02-20 RX ADMIN — MULTIPLE VITAMINS W/ MINERALS TAB 1 TABLET: TAB at 10:23

## 2019-02-20 RX ADMIN — PROPRANOLOL HYDROCHLORIDE 80 MG: 80 CAPSULE, EXTENDED RELEASE ORAL at 11:56

## 2019-02-20 RX ADMIN — FAMOTIDINE 20 MG: 20 TABLET, FILM COATED ORAL at 10:23

## 2019-02-20 RX ADMIN — CLOPIDOGREL BISULFATE 75 MG: 75 TABLET, FILM COATED ORAL at 10:23

## 2019-02-20 RX ADMIN — LEVETIRACETAM 500 MG: 500 TABLET ORAL at 10:23

## 2019-02-20 RX ADMIN — LAMOTRIGINE 100 MG: 100 TABLET ORAL at 10:23

## 2019-02-20 ASSESSMENT — PAIN SCALES - GENERAL: PAINLEVEL_OUTOF10: 0

## 2019-02-21 LAB
ANTICARDIOLIPIN IGA ANTIBODY: 0
ANTICARDIOLIPIN IGG ANTIBODY: 1
ANTITHROMBIN ACTIVITY: 111
CARDIOLIPIN AB IGM: 0
FACTOR VIII ACTIVITY: NORMAL % (ref 50–150)
PROTEIN C ACTIVITY: 141 % (ref 70–130)
PROTEIN S ACTIVITY: 61 % (ref 65–140)

## 2019-02-22 LAB
FACTOR V LEIDEN: NORMAL
FACTOR V LEIDEN: NORMAL
MTHFR BY PCR SPECIMEN: NORMAL
MTHFR INTERPRETATION: NORMAL
MTHFR MUTATION A1298C: NORMAL
MTHFR MUTATION C677T: NORMAL
PROTHROMBIN G20210A MUTATION: NORMAL
PROTHROMBIN SPECIMEN SOURCE: NORMAL

## 2019-02-25 LAB
PHOSPHATIDYLSERINE IGA ANTIBODY: 1
PHOSPHATIDYLSERINE IGG ANTIBODY: 3
PHOSPHATIDYLSERINE IGM ANTIBODY: 5

## 2019-03-11 ENCOUNTER — HOSPITAL ENCOUNTER (OUTPATIENT)
Age: 54
Discharge: HOME | End: 2019-03-11
Payer: COMMERCIAL

## 2019-03-11 LAB
INR BLD: 9.88 INDEX
PROTHROMBIN TIME: 110.1 SECONDS (ref 9.12–12.5)

## 2019-03-11 PROCEDURE — 36415 COLL VENOUS BLD VENIPUNCTURE: CPT

## 2019-03-11 PROCEDURE — 85610 PROTHROMBIN TIME: CPT

## 2019-03-12 ENCOUNTER — HOSPITAL ENCOUNTER (EMERGENCY)
Age: 54
Discharge: HOME | End: 2019-03-12
Payer: MEDICARE

## 2019-03-12 VITALS
BODY MASS INDEX: 27.09 KG/M2 | HEIGHT: 72 IN | SYSTOLIC BLOOD PRESSURE: 121 MMHG | OXYGEN SATURATION: 100 % | HEART RATE: 81 BPM | RESPIRATION RATE: 15 BRPM | TEMPERATURE: 98 F | DIASTOLIC BLOOD PRESSURE: 89 MMHG | WEIGHT: 200 LBS

## 2019-03-12 DIAGNOSIS — R79.1 INR (INTERNATIONAL NORMAL RATIO) ABNORMAL: Primary | ICD-10-CM

## 2019-03-12 LAB
ALBUMIN SERPL-MCNC: 4.1 GM/DL (ref 3.4–5)
ALP BLD-CCNC: 142 IU/L (ref 40–129)
ALT SERPL-CCNC: 16 U/L (ref 10–40)
ANION GAP SERPL CALCULATED.3IONS-SCNC: 12 MMOL/L (ref 4–16)
APTT: 108.8 SECONDS (ref 21.2–33)
AST SERPL-CCNC: 22 IU/L (ref 15–37)
BASOPHILS ABSOLUTE: 0.1 K/CU MM
BASOPHILS RELATIVE PERCENT: 1.7 % (ref 0–1)
BILIRUB SERPL-MCNC: 0.3 MG/DL (ref 0–1)
BUN BLDV-MCNC: 19 MG/DL (ref 6–23)
CALCIUM SERPL-MCNC: 8.8 MG/DL (ref 8.3–10.6)
CHLORIDE BLD-SCNC: 106 MMOL/L (ref 99–110)
CO2: 21 MMOL/L (ref 21–32)
CREAT SERPL-MCNC: 1 MG/DL (ref 0.9–1.3)
DIFFERENTIAL TYPE: ABNORMAL
EOSINOPHILS ABSOLUTE: 0.5 K/CU MM
EOSINOPHILS RELATIVE PERCENT: 8.9 % (ref 0–3)
GFR AFRICAN AMERICAN: >60 ML/MIN/1.73M2
GFR NON-AFRICAN AMERICAN: >60 ML/MIN/1.73M2
GLUCOSE BLD-MCNC: 98 MG/DL (ref 70–99)
HCT VFR BLD CALC: 48.4 % (ref 42–52)
HEMOGLOBIN: 15.9 GM/DL (ref 13.5–18)
IMMATURE NEUTROPHIL %: 0.4 % (ref 0–0.43)
INR BLD: >10.1 INDEX
LYMPHOCYTES ABSOLUTE: 2 K/CU MM
LYMPHOCYTES RELATIVE PERCENT: 38 % (ref 24–44)
MCH RBC QN AUTO: 33.3 PG (ref 27–31)
MCHC RBC AUTO-ENTMCNC: 32.9 % (ref 32–36)
MCV RBC AUTO: 101.5 FL (ref 78–100)
MONOCYTES ABSOLUTE: 0.6 K/CU MM
MONOCYTES RELATIVE PERCENT: 11 % (ref 0–4)
NUCLEATED RBC %: 0 %
PDW BLD-RTO: 11.7 % (ref 11.7–14.9)
PLATELET # BLD: 218 K/CU MM (ref 140–440)
PMV BLD AUTO: 9.4 FL (ref 7.5–11.1)
POTASSIUM SERPL-SCNC: 4.3 MMOL/L (ref 3.5–5.1)
PROTHROMBIN TIME: 118.5 SECONDS (ref 9.12–12.5)
RBC # BLD: 4.77 M/CU MM (ref 4.6–6.2)
SEGMENTED NEUTROPHILS ABSOLUTE COUNT: 2.2 K/CU MM
SEGMENTED NEUTROPHILS RELATIVE PERCENT: 40 % (ref 36–66)
SODIUM BLD-SCNC: 139 MMOL/L (ref 135–145)
TOTAL IMMATURE NEUTOROPHIL: 0.02 K/CU MM
TOTAL NUCLEATED RBC: 0 K/CU MM
TOTAL PROTEIN: 7.3 GM/DL (ref 6.4–8.2)
WBC # BLD: 5.4 K/CU MM (ref 4–10.5)

## 2019-03-12 PROCEDURE — 85025 COMPLETE CBC W/AUTO DIFF WBC: CPT

## 2019-03-12 PROCEDURE — 99283 EMERGENCY DEPT VISIT LOW MDM: CPT

## 2019-03-12 PROCEDURE — 85730 THROMBOPLASTIN TIME PARTIAL: CPT

## 2019-03-12 PROCEDURE — 85610 PROTHROMBIN TIME: CPT

## 2019-03-12 PROCEDURE — 36415 COLL VENOUS BLD VENIPUNCTURE: CPT

## 2019-03-12 PROCEDURE — 80053 COMPREHEN METABOLIC PANEL: CPT

## 2019-03-12 PROCEDURE — 6370000000 HC RX 637 (ALT 250 FOR IP): Performed by: PHYSICIAN ASSISTANT

## 2019-03-12 RX ORDER — PHYTONADIONE 5 MG/1
5 TABLET ORAL ONCE
Status: COMPLETED | OUTPATIENT
Start: 2019-03-12 | End: 2019-03-12

## 2019-03-12 RX ADMIN — PHYTONADIONE 5 MG: 5 TABLET ORAL at 12:10

## 2019-04-06 ENCOUNTER — HOSPITAL ENCOUNTER (EMERGENCY)
Age: 54
Discharge: HOME | End: 2019-04-06
Payer: MEDICARE

## 2019-04-06 ENCOUNTER — APPOINTMENT (OUTPATIENT)
Dept: GENERAL RADIOLOGY | Age: 54
End: 2019-04-06
Payer: MEDICARE

## 2019-04-06 VITALS
WEIGHT: 215 LBS | BODY MASS INDEX: 29.12 KG/M2 | HEIGHT: 72 IN | TEMPERATURE: 98.8 F | SYSTOLIC BLOOD PRESSURE: 112 MMHG | DIASTOLIC BLOOD PRESSURE: 86 MMHG | HEART RATE: 84 BPM | OXYGEN SATURATION: 95 % | RESPIRATION RATE: 18 BRPM

## 2019-04-06 DIAGNOSIS — S69.91XA THUMB INJURY, RIGHT, INITIAL ENCOUNTER: Primary | ICD-10-CM

## 2019-04-06 PROCEDURE — 6370000000 HC RX 637 (ALT 250 FOR IP): Performed by: NURSE PRACTITIONER

## 2019-04-06 PROCEDURE — 99283 EMERGENCY DEPT VISIT LOW MDM: CPT

## 2019-04-06 PROCEDURE — 73130 X-RAY EXAM OF HAND: CPT

## 2019-04-06 RX ORDER — ACETAMINOPHEN 500 MG
1000 TABLET ORAL ONCE
Status: COMPLETED | OUTPATIENT
Start: 2019-04-06 | End: 2019-04-06

## 2019-04-06 RX ADMIN — ACETAMINOPHEN 1000 MG: 500 TABLET ORAL at 14:42

## 2019-04-06 ASSESSMENT — PAIN SCALES - GENERAL
PAINLEVEL_OUTOF10: 7
PAINLEVEL_OUTOF10: 7

## 2019-04-06 ASSESSMENT — PAIN DESCRIPTION - FREQUENCY: FREQUENCY: CONTINUOUS

## 2019-04-06 ASSESSMENT — PAIN DESCRIPTION - DESCRIPTORS: DESCRIPTORS: DISCOMFORT;ACHING

## 2019-04-06 ASSESSMENT — PAIN DESCRIPTION - PAIN TYPE: TYPE: ACUTE PAIN

## 2019-04-06 ASSESSMENT — PAIN DESCRIPTION - PROGRESSION: CLINICAL_PROGRESSION: NOT CHANGED

## 2019-04-06 ASSESSMENT — PAIN DESCRIPTION - ORIENTATION: ORIENTATION: RIGHT

## 2019-04-06 ASSESSMENT — PAIN DESCRIPTION - ONSET: ONSET: ON-GOING

## 2019-04-06 ASSESSMENT — PAIN DESCRIPTION - LOCATION: LOCATION: FINGER (COMMENT WHICH ONE)

## 2019-04-06 NOTE — ED PROVIDER NOTES
eMERGENCY dEPARTMENT eNCOUnter      PCP: Juan Alpers, MD    CHIEF COMPLAINT    Chief Complaint   Patient presents with    Hand Injury     shut right thumb in trunk       [facility]    Cory Durbin is a 46 y.o. male who presents with right hand/thumb pain after slamming thumb in trunk just prior to arrival.    Complains of distal numbness, tingling, weakness, and functional deficit. No other pain. He is currently on a blood thinner. Denies hitting his head. Caregiver with patient denies any other injuries as well. REVIEW OF SYSTEMS    General: Denies constitutional symptoms. Cardiac: Denies chest wall injury or chest pain  Pulmonary: Denies chest wall injury or shortness of breath  GI: Denies abdomen injury or abdominal pain  Musculoskeletal:   Denies any other musculoskeletal pain or injuries, including chest wall and back. Skin:  Skin intact. Lymphatics:  No nodules or streaks. See HPI and nursing notes for additional information     PAST MEDICAL & SURGICAL HISTORY    Past Medical History:   Diagnosis Date    [facility] injury ([facility] [address]     GERD (gastroesophageal reflux disease)     Hyperlipidemia     Hypertension     Seizures ([facility] [address]      No past surgical history on file. CURRENT MEDICATIONS    Current Outpatient Rx   Medication Sig Dispense Refill    warfarin (COUMADIN) 5 MG tablet Take 1 tablet by mouth daily 30 tablet 3    levETIRAcetam (KEPPRA) 500 MG tablet Take 1 tablet by mouth 2 times daily 60 tablet 3    phenytoin (DILANTIN) 125 MG/5ML suspension Take 4 mLs by mouth 3 times daily 1 Bottle 3    nicotine (NICODERM CQ) 14 MG/24HR Place 1 patch onto the skin daily 30 patch 3    Multiple Vitamins-Minerals (THERAPEUTIC MULTIVITAMIN-MINERALS) tablet Take 1 tablet by mouth daily      ibuprofen (ADVIL;MOTRIN) 600 MG tablet Take 1 tablet by mouth every 6 hours as needed for Pain 30 tablet 1    busPIRone (BUSPAR) 15 MG tablet Take 15 mg by mouth 3 times daily.       lamoTRIgine (LAMICTAL) 100 MG tablet Take 100 mg by mouth daily.  multivitamin (THERAGRAN) per tablet Take 1 tablet by mouth daily.  famotidine (PEPCID) 20 MG tablet Take 20 mg by mouth daily.  fluvoxamine (LUVOX) 100 MG tablet Take 100 mg by mouth 2 times daily. 1 tab in AM & 2 tabs @@ HS       propranolol (INDERAL) 80 MG tablet Take 80 mg by mouth daily.  gemfibrozil (LOPID) 600 MG tablet Take 600 mg by mouth 2 times daily (before meals).  clonazePAM (KLONOPIN) 0.5 MG tablet Take 0.5 mg by mouth 4 times daily as needed.            ALLERGIES    Allergies   Allergen Reactions    Amoxicillin Other (See Comments)     unknown    Bactrim [Sulfamethoxazole-Trimethoprim]        SOCIAL & FAMILY HISTORY    Social History     Socioeconomic History    Marital status: Single     Spouse name: Not on file    Number of children: Not on file    Years of education: Not on file    Highest education level: Not on file   Occupational History    Not on file   Social Needs    Financial resource strain: Not on file    Food insecurity:     Worry: Not on file     Inability: Not on file    Transportation needs:     Medical: Not on file     Non-medical: Not on file   Tobacco Use    Smoking status: Current Every Day Smoker     Packs/day: 0.50    Smokeless tobacco: Never Used   Substance and Sexual Activity    Alcohol use: No    Drug use: No    Sexual activity: Not on file   Lifestyle    Physical activity:     Days per week: Not on file     Minutes per session: Not on file    Stress: Not on file   Relationships    Social connections:     Talks on phone: Not on file     Gets together: Not on file     Attends [employer] service: Not on file     Active member of club or organization: Not on file     Attends meetings of clubs or organizations: Not on file     Relationship status: Not on file    Intimate partner violence:     Fear of current or ex partner: Not on file     Emotionally abused: Not on file     Physically abused: Not on file     Forced sexual activity: Not on file   Other Topics Concern    Not on file   Social History Narrative    Not on file     No family history on file. PHYSICAL EXAM    VITAL SIGNS: /86   Pulse 84   Temp 98.8 °F (37.1 °C) (Oral)   Resp 18   Ht 5' 11.5\" (1.816 m)   Wt 215 lb (97.5 kg)   SpO2 95%   BMI 29.57 kg/m²   Constitutional:  Well developed, well nourished, no acute distress, non-toxic appearance   HENT:  Atraumatic    Musculoskeletal:   Pain with palpation and flexion. Distal capillary refill, pulses, sensation and motor intact. No subungual hematoma. Examination of remaining extremities reveals active ROM of  joints without ligamentous laxity. Theres no obvious joint or bony deformity. Lymphatics:  No swollen nodes or streaks. Integument:  Well hydrated, no rash. Skin intact. Contusion and petechiae developing over volar aspect of 1st digit. Vascular: affected extremity distally neurovascularly intact - pulses, sensation moderately diminished, and capillary refill intact. Neurologic:  Awake alert, normal flow of speech. Cranial nerves II through XII grossly intact. Psychiatric: Cooperative, pleasant affect    RADIOLOGY/PROCEDURES    XR HAND RIGHT (MIN 3 VIEWS) (Final result)   Result time 04/06/19 14:23:35   Final result by Adela Nielsen MD (04/06/19 14:23:35)                Impression:    No acute findings in the right hand.             Narrative:    EXAMINATION:  3 XRAY VIEWS OF THE RIGHT HAND    4/6/2019 1:59 pm    COMPARISON:  None    HISTORY:  ORDERING SYSTEM PROVIDED HISTORY: shut thumb in door  TECHNOLOGIST PROVIDED HISTORY:  Reason for exam:->shut thumb in door  Ordering Physician Provided Reason for Exam: shut thumb in car door  Acuity: Acute  Type of Exam: Initial    FINDINGS:  No acute fracture.  Joints maintain anatomic alignment.  No obvious acute  soft tissue abnormality.                        ED COURSE & MEDICAL DECISION MAKING Vital signs and nursing notes reviewed during ED course. I have independently evaluated this patient . Supervising physican present in the Emergency Department, available for consultation, throughout entirety of  patient care. All pertinent Lab data and radiographic results reviewed with patient at bedside. The patient and/or the family were informed of the treatment plan, and time was allotted to answer questions. Disposition and plan discussed at bedside with patient and/or the family today. X-rays not having acute osseous abnormalities today. There is no subungual hematoma. The skin is intact. Recommended ice, elevation and compression wrap which was placed. Signs and symptoms that would necessitate return to the emergency department were discussed the patient and/or family and patient and/or family agrees to return to the emergency department if the symptoms worsen or new symptoms develop. Differential diagnosis: includes but not limited to ligamentous injury, tendon injury, soft tissue contusion/hematoma, fracture, dislocation, Infection, Neurologic Deficit/Injury. Clinical  IMPRESSION    1. Thumb injury, right, initial encounter          Comment: Please note this report has been produced using speech recognition software and may contain errors related to that system including errors in grammar, punctuation, and spelling, as well as words and phrases that may be inappropriate. If there are any questions or concerns please feel free to contact the dictating provider for clarification.      ABHISHEK Araiza - BINTA  04/06/19 5720

## 2019-04-06 NOTE — ED NOTES
Discharge instructions given to pt. Pt verbalized his understanding and denied any questions or concerns at this time. Pt walked with caregiver to private vehicle.      Mariangel Bear RN  04/06/19 0449

## 2019-04-06 NOTE — ED TRIAGE NOTES
Pt presents to the ER with c/o having shut his right thumb in the trunk of his caregivers car. Pt rates pain 7/10.

## 2019-04-25 ENCOUNTER — APPOINTMENT (OUTPATIENT)
Dept: CT IMAGING | Age: 54
End: 2019-04-25
Payer: MEDICARE

## 2019-04-25 ENCOUNTER — HOSPITAL ENCOUNTER (EMERGENCY)
Age: 54
Discharge: HOME | End: 2019-04-25
Attending: EMERGENCY MEDICINE
Payer: MEDICARE

## 2019-04-25 VITALS
BODY MASS INDEX: 30.8 KG/M2 | RESPIRATION RATE: 16 BRPM | TEMPERATURE: 97.6 F | HEIGHT: 71 IN | HEART RATE: 88 BPM | SYSTOLIC BLOOD PRESSURE: 124 MMHG | WEIGHT: 220 LBS | DIASTOLIC BLOOD PRESSURE: 75 MMHG | OXYGEN SATURATION: 97 %

## 2019-04-25 DIAGNOSIS — S09.90XA CLOSED HEAD INJURY, INITIAL ENCOUNTER: Primary | ICD-10-CM

## 2019-04-25 LAB
INR BLD: 1.75 INDEX
PROTHROMBIN TIME: 19.8 SECONDS (ref 9.12–12.5)

## 2019-04-25 PROCEDURE — 36415 COLL VENOUS BLD VENIPUNCTURE: CPT

## 2019-04-25 PROCEDURE — 85610 PROTHROMBIN TIME: CPT

## 2019-04-25 PROCEDURE — 99284 EMERGENCY DEPT VISIT MOD MDM: CPT

## 2019-04-25 PROCEDURE — 70450 CT HEAD/BRAIN W/O DYE: CPT

## 2019-04-25 PROCEDURE — 72125 CT NECK SPINE W/O DYE: CPT

## 2019-04-25 NOTE — ED PROVIDER NOTES
Triage Chief Complaint:   Fall (fell out of bed, MRDD, blood thinners, thinks fell out of bed) and Head Injury (abrasion to forehead)    [place]:  Linda Butts is a 53 y.o. male that presents with head injury. Patient was in baseline state of health until this AM when he fell. Patient fell getting out of bed, but is not sure regarding specifics of injury. Patient did hit is head and with unknown LOC. Patient with 7/10 pain at site of head injury and this worse with palpation. No numbness or weakness. Patient with remote head injury causing mental delay at this time. Patient is coming from group home and care giver reports he is at baseline status. Patient is on Coumadin for DVT. ROS:  General:  No fevers, no chills, no weakness  Eyes:  No recent vison changes, no discharge  ENT:  No difficulty swallowing, no blood from nose, no hearing changes  Cardiovascular:  No chest pain, no palpitations  Respiratory:  No shortness of breath, no coughing up blood, no wheezing  Gastrointestinal:  No pain, no nausea, no vomiting, no diarrhea  Musculoskeletal:  No muscle pain, no joint pain, no back pain  Skin:  No rash, no cuts, no easy bruising  Neurologic:  No speech problems, + headache, no extremity numbness, no extremity tingling, no extremity weakness  Psychiatric:  No anxiety  Genitourinary:  No dysuria, no hematuria  Extremities:  no edema, no pain    Past Medical History:   Diagnosis Date    Brain injury ([facility] [address]     GERD (gastroesophageal reflux disease)     Hyperlipidemia     Hypertension     Seizures ([facility] [address]      No past surgical history on file. No family history on file.   Social History     Socioeconomic History    Marital status: Single     Spouse name: Not on file    Number of children: Not on file    Years of education: Not on file    Highest education level: Not on file   Occupational History    Not on file   Social Needs    Financial resource strain: Not on file    Food insecurity: Worry: Not on file     Inability: Not on file    Transportation needs:     Medical: Not on file     Non-medical: Not on file   Tobacco Use    Smoking status: Current Every Day Smoker     Packs/day: 0.50    Smokeless tobacco: Never Used   Substance and Sexual Activity    Alcohol use: No    Drug use: No    Sexual activity: Not on file   Lifestyle    Physical activity:     Days per week: Not on file     Minutes per session: Not on file    Stress: Not on file   Relationships    Social connections:     Talks on phone: Not on file     Gets together: Not on file     Attends [employer] service: Not on file     Active member of club or organization: Not on file     Attends meetings of clubs or organizations: Not on file     Relationship status: Not on file    Intimate partner violence:     Fear of current or ex partner: Not on file     Emotionally abused: Not on file     Physically abused: Not on file     Forced sexual activity: Not on file   Other Topics Concern    Not on file   Social History Narrative    Not on file     No current facility-administered medications for this encounter. Current Outpatient Medications   Medication Sig Dispense Refill    warfarin (COUMADIN) 5 MG tablet Take 1 tablet by mouth daily 30 tablet 3    levETIRAcetam (KEPPRA) 500 MG tablet Take 1 tablet by mouth 2 times daily 60 tablet 3    phenytoin (DILANTIN) 125 MG/5ML suspension Take 4 mLs by mouth 3 times daily 1 Bottle 3    nicotine (NICODERM CQ) 14 MG/24HR Place 1 patch onto the skin daily 30 patch 3    Multiple Vitamins-Minerals (THERAPEUTIC MULTIVITAMIN-MINERALS) tablet Take 1 tablet by mouth daily      ibuprofen (ADVIL;MOTRIN) 600 MG tablet Take 1 tablet by mouth every 6 hours as needed for Pain 30 tablet 1    busPIRone (BUSPAR) 15 MG tablet Take 15 mg by mouth 3 times daily.  lamoTRIgine (LAMICTAL) 100 MG tablet Take 100 mg by mouth daily.         multivitamin (THERAGRAN) per tablet Take 1 tablet by mouth daily.        famotidine (PEPCID) 20 MG tablet Take 20 mg by mouth daily.  fluvoxamine (LUVOX) 100 MG tablet Take 100 mg by mouth 2 times daily. 1 tab in AM & 2 tabs @@ HS       propranolol (INDERAL) 80 MG tablet Take 80 mg by mouth daily.  gemfibrozil (LOPID) 600 MG tablet Take 600 mg by mouth 2 times daily (before meals).  clonazePAM (KLONOPIN) 0.5 MG tablet Take 0.5 mg by mouth 4 times daily as needed. Allergies   Allergen Reactions    Amoxicillin Other (See Comments)     unknown    Bactrim [Sulfamethoxazole-Trimethoprim]        Nursing Notes Reviewed    Physical Exam:  ED Triage Vitals [04/25/19 1118]   Enc Vitals Group      /75      Pulse 88      Resp 16      Temp 97.6 °F (36.4 °C)      Temp Source Oral      SpO2 97 %      Weight 220 lb (99.8 kg)      Height 5' 11\" (1.803 m)      Head Circumference       Peak Flow       Pain Score       Pain Loc       Pain Edu? Excl. in [address]? My pulse ox interpretation is - 97% on RA    General appearance:  No acute distress. Sitting comfortably in bed. Pleasant. Skin:  Warm. Dry. Abrasion to right frontal scalp/forehead. Eye:  Extraocular movements intact. Anisocoria with left pupil 7 mm to 5 mm with light and right pupil 5 mm to 3 mm with light. Ears, nose, mouth and throat:  No cephalohematoma, corona sign or raccoon eyes. Midface is stable. No dental malocclusion. Neck:  Trachea midline. No midline bony cervical tenderness. Extremity:  No swelling. Normal ROM. No gross deformity ×4 extremities. Extremities are nontender. Heart:  Regular rate and rhythm, normal S1 & S2, no extra heart sounds. Perfusion:  Intact   Respiratory:  Lungs clear to auscultation bilaterally. Respirations nonlabored. Chest wall is nontender. No crepitance. Abdominal:  Normal bowel sounds. Soft. Nontender. Non distended. Back:  No midline bony TLS tenderness or step-off. Neurological:  Alert and oriented times 3.  No focal neuro deficits. Sensation intact to light touch to distal upper/lower extremities; 5/5 and symmetric shrug, , HF, KF/KE, and dorsi/plantar flexion; symmetric brow raise and nasolabial folds, tongue midline; FTN and CHRISTELLE intact; 2+ and symmetric reflexes to bilateral upper/lower extremities; ambulates with steady gait; no dysarthria or aphasia          Psychiatric:  Appropriate    I have reviewed and interpreted all of the currently available lab results from this visit (if applicable):  Results for orders placed or performed during the hospital encounter of 04/25/19   PT - INR   Result Value Ref Range    Protime 19.8 (H) 9.12 - 12.5 SECONDS    INR 1.75 INDEX      Radiographs (if obtained):  [] The following radiograph was interpreted by myself in the absence of a radiologist:   [x] Radiologist's Report Reviewed:  CT CERVICAL SPINE WO CONTRAST   Final Result   No acute abnormality of the cervical spine. CT HEAD WO CONTRAST   Final Result   No acute intracranial abnormality with stable chronic findings as described. EKG (if obtained): (All EKG's are interpreted by myself in the absence of a cardiologist)    Chart review shows recent radiographs:  Xr Hand Right (min 3 Views)    Result Date: 4/6/2019  EXAMINATION: 3 XRAY VIEWS OF THE RIGHT HAND 4/6/2019 1:59 pm COMPARISON: None HISTORY: ORDERING SYSTEM PROVIDED HISTORY: shut thumb in door TECHNOLOGIST PROVIDED HISTORY: Reason for exam:->shut thumb in door Ordering Physician Provided Reason for Exam: shut thumb in car door Acuity: Acute Type of Exam: Initial FINDINGS: No acute fracture. Joints maintain anatomic alignment. No obvious acute soft tissue abnormality. No acute findings in the right hand. MDM:  Pt presents as above. Emergent conditions considered. Presentation prompted initial INR check and CT imaging. INR is slightly subtherapeutic. CT imaging of patient's head and neck negative for acute traumatic injury.     Patient neurologically intact with stable vital signs. Patient now hours out from his injury. Patient is appropriate for further outpatient follow-up. Patient discharged back to the living facility. Questions sought and answered with the patient. They voice understanding and agree with plan. Instructed to return for any worsening or worrisome concerns. Clinical Impression:  1. Closed head injury, initial encounter      Disposition referral (if applicable):  Rodney Ewing MD  [id number] [address] [place]   [phone]    Schedule an appointment as soon as possible for a visit       [facility] Emergency Department  [place]  [address]  [phone]  Today  If symptoms worsen    Disposition medications (if applicable):  New Prescriptions    No medications on file       Comment: Please note this report has been produced using speech recognition software and may contain errors related to that system including errors in grammar, punctuation, and spelling, as well as words and phrases that may be inappropriate. If there are any questions or concerns please feel free to contact the dictating provider for clarification.        Odalys Young MD  04/25/19 0994

## 2019-04-25 NOTE — LETTER
[facility] Emergency Department  [facility] [address] [address]  Phone: [phone]  Fax: [phone]             April 25, 2019    Patient: Jacquelin Lewis   YOB: 1965   Date of Visit: 4/25/2019       To Whom It May Concern:    Laura Braxton was seen and treated in our emergency department on 4/25/2019. He may return to work on 4/26/19.     RETURN TO WORK WITHOUT RESTRICTIONS  Sincerely,       Katlyn Patient RN      Signature:__________________________________

## 2019-07-23 ENCOUNTER — HOSPITAL ENCOUNTER (EMERGENCY)
Age: 54
Discharge: HOME | End: 2019-07-23
Payer: MEDICARE

## 2019-07-23 ENCOUNTER — APPOINTMENT (OUTPATIENT)
Dept: GENERAL RADIOLOGY | Age: 54
End: 2019-07-23
Payer: MEDICARE

## 2019-07-23 VITALS
SYSTOLIC BLOOD PRESSURE: 111 MMHG | HEART RATE: 75 BPM | RESPIRATION RATE: 18 BRPM | WEIGHT: 220 LBS | TEMPERATURE: 98 F | BODY MASS INDEX: 30.68 KG/M2 | DIASTOLIC BLOOD PRESSURE: 87 MMHG | OXYGEN SATURATION: 100 %

## 2019-07-23 DIAGNOSIS — S69.91XA INJURY OF RIGHT HAND, INITIAL ENCOUNTER: Primary | ICD-10-CM

## 2019-07-23 PROCEDURE — 73130 X-RAY EXAM OF HAND: CPT

## 2019-07-23 PROCEDURE — 99283 EMERGENCY DEPT VISIT LOW MDM: CPT

## 2019-07-23 PROCEDURE — 6370000000 HC RX 637 (ALT 250 FOR IP): Performed by: PHYSICIAN ASSISTANT

## 2019-07-23 RX ORDER — IBUPROFEN 600 MG/1
600 TABLET ORAL ONCE
Status: COMPLETED | OUTPATIENT
Start: 2019-07-23 | End: 2019-07-23

## 2019-07-23 RX ADMIN — IBUPROFEN 600 MG: 600 TABLET, FILM COATED ORAL at 15:21

## 2019-07-23 ASSESSMENT — PAIN DESCRIPTION - ORIENTATION: ORIENTATION: RIGHT

## 2019-07-23 ASSESSMENT — PAIN SCALES - GENERAL
PAINLEVEL_OUTOF10: 8
PAINLEVEL_OUTOF10: 8

## 2019-07-23 ASSESSMENT — PAIN DESCRIPTION - LOCATION: LOCATION: HAND

## 2019-07-23 NOTE — ED NOTES
Bed: ED-04  Expected date:   Expected time:   Means of arrival:   Comments:  EMS     Harmony Frias RN  07/23/19 2065

## 2019-07-23 NOTE — ED PROVIDER NOTES
eMERGENCY dEPARTMENT eNCOUnter      PCP: Sue Lutz MD    [facility]    Chief Complaint   Patient presents with    Hand Injury       HPI    Fransico Bales is a 53 y.o. male who presents with Right hand pain. Onset was pta. The context is patient was in a handicap [place]  in the [place]  door shut on his right hand. Patient having some pain to this right hand since. Denies any other injuries. Patient is somewhat poor historian, has limited history given his history of brain injury. The pain severity is right hand. The patient has no associated other injuries. The pain is aggravated by hand movement and direct palpation. REVIEW OF SYSTEMS    General: Denies fever or chills  Cardiac: Denies chest pain or chestwall pain. Pulmonary: Denies shortness of breath  GI: Denies abdominal pain, vomiting, or diarrhea  Skin:  Intact  Musculoskeletal: See HPI. Denies any other muscles skeletal injuries, including elbow, shoulder,chest wall and back. All other review of systems are negative  See HPI and nursing notes for additional information     PAST MEDICAL & SURGICAL HISTORY    Past Medical History:   Diagnosis Date    Brain injury ([facility] [address]     GERD (gastroesophageal reflux disease)     Hyperlipidemia     Hypertension     Seizures ([facility] [address]      History reviewed. No pertinent surgical history.     CURRENT MEDICATIONS    Current Outpatient Rx   Medication Sig Dispense Refill    warfarin (COUMADIN) 5 MG tablet Take 1 tablet by mouth daily 30 tablet 3    levETIRAcetam (KEPPRA) 500 MG tablet Take 1 tablet by mouth 2 times daily 60 tablet 3    phenytoin (DILANTIN) 125 MG/5ML suspension Take 4 mLs by mouth 3 times daily 1 Bottle 3    nicotine (NICODERM CQ) 14 MG/24HR Place 1 patch onto the skin daily 30 patch 3    Multiple Vitamins-Minerals (THERAPEUTIC MULTIVITAMIN-MINERALS) tablet Take 1 tablet by mouth daily      ibuprofen (ADVIL;MOTRIN) 600 MG tablet Take 1 tablet by mouth every 6 hours as needed for Pain 30 tablet VINICIO Yoo  07/23/19 1554

## 2019-07-25 ENCOUNTER — HOSPITAL ENCOUNTER (OUTPATIENT)
Age: 54
Discharge: HOME | End: 2019-07-25
Payer: COMMERCIAL

## 2019-07-25 LAB
ALBUMIN SERPL-MCNC: 4.7 GM/DL (ref 3.4–5)
ALP BLD-CCNC: 117 IU/L (ref 40–128)
ALT SERPL-CCNC: 16 U/L (ref 10–40)
ANION GAP SERPL CALCULATED.3IONS-SCNC: 15 MMOL/L (ref 4–16)
AST SERPL-CCNC: 23 IU/L (ref 15–37)
BASOPHILS ABSOLUTE: 0.1 K/CU MM
BASOPHILS RELATIVE PERCENT: 1.3 % (ref 0–1)
BILIRUB SERPL-MCNC: 0.4 MG/DL (ref 0–1)
BUN BLDV-MCNC: 19 MG/DL (ref 6–23)
CALCIUM SERPL-MCNC: 9.5 MG/DL (ref 8.3–10.6)
CHLORIDE BLD-SCNC: 106 MMOL/L (ref 99–110)
CHOLESTEROL: 184 MG/DL
CO2: 24 MMOL/L (ref 21–32)
CREAT SERPL-MCNC: 0.9 MG/DL (ref 0.9–1.3)
DIFFERENTIAL TYPE: ABNORMAL
EOSINOPHILS ABSOLUTE: 0.6 K/CU MM
EOSINOPHILS RELATIVE PERCENT: 8.7 % (ref 0–3)
ESTIMATED AVERAGE GLUCOSE: 105 MG/DL
GFR AFRICAN AMERICAN: >60 ML/MIN/1.73M2
GFR NON-AFRICAN AMERICAN: >60 ML/MIN/1.73M2
GLUCOSE BLD-MCNC: 87 MG/DL (ref 70–99)
HBA1C MFR BLD: 5.3 % (ref 4.2–6.3)
HCT VFR BLD CALC: 46.6 % (ref 42–52)
HDLC SERPL-MCNC: 47 MG/DL
HEMOGLOBIN: 15.3 GM/DL (ref 13.5–18)
IMMATURE NEUTROPHIL %: 0.3 % (ref 0–0.43)
LDL CHOLESTEROL DIRECT: 126 MG/DL
LYMPHOCYTES ABSOLUTE: 2.9 K/CU MM
LYMPHOCYTES RELATIVE PERCENT: 42.6 % (ref 24–44)
MCH RBC QN AUTO: 33.3 PG (ref 27–31)
MCHC RBC AUTO-ENTMCNC: 32.8 % (ref 32–36)
MCV RBC AUTO: 101.3 FL (ref 78–100)
MONOCYTES ABSOLUTE: 0.8 K/CU MM
MONOCYTES RELATIVE PERCENT: 11.6 % (ref 0–4)
NUCLEATED RBC %: 0 %
PDW BLD-RTO: 11.9 % (ref 11.7–14.9)
PLATELET # BLD: 234 K/CU MM (ref 140–440)
PMV BLD AUTO: 9.7 FL (ref 7.5–11.1)
POTASSIUM SERPL-SCNC: 4.2 MMOL/L (ref 3.5–5.1)
PROSTATE SPECIFIC ANTIGEN: 1.44 NG/ML (ref 0–4)
RBC # BLD: 4.6 M/CU MM (ref 4.6–6.2)
SEGMENTED NEUTROPHILS ABSOLUTE COUNT: 2.4 K/CU MM
SEGMENTED NEUTROPHILS RELATIVE PERCENT: 35.5 % (ref 36–66)
SODIUM BLD-SCNC: 145 MMOL/L (ref 135–145)
TOTAL CK: 120 IU/L (ref 38–174)
TOTAL IMMATURE NEUTOROPHIL: 0.02 K/CU MM
TOTAL NUCLEATED RBC: 0 K/CU MM
TOTAL PROTEIN: 7.2 GM/DL (ref 6.4–8.2)
TRIGL SERPL-MCNC: 214 MG/DL
WBC # BLD: 6.8 K/CU MM (ref 4–10.5)

## 2019-07-25 PROCEDURE — 80061 LIPID PANEL: CPT

## 2019-07-25 PROCEDURE — 80053 COMPREHEN METABOLIC PANEL: CPT

## 2019-07-25 PROCEDURE — 85025 COMPLETE CBC W/AUTO DIFF WBC: CPT

## 2019-07-25 PROCEDURE — 36415 COLL VENOUS BLD VENIPUNCTURE: CPT

## 2019-07-25 PROCEDURE — 83036 HEMOGLOBIN GLYCOSYLATED A1C: CPT

## 2019-07-25 PROCEDURE — G0103 PSA SCREENING: HCPCS

## 2019-07-25 PROCEDURE — 83721 ASSAY OF BLOOD LIPOPROTEIN: CPT

## 2019-07-25 PROCEDURE — 82550 ASSAY OF CK (CPK): CPT

## 2019-09-18 ENCOUNTER — HOSPITAL ENCOUNTER (OUTPATIENT)
Age: 54
Discharge: HOME | End: 2019-09-18
Payer: MEDICARE

## 2019-09-18 LAB
ALBUMIN SERPL-MCNC: 4.6 GM/DL (ref 3.4–5)
ALP BLD-CCNC: 114 IU/L (ref 40–128)
ALT SERPL-CCNC: 18 U/L (ref 10–40)
ANION GAP SERPL CALCULATED.3IONS-SCNC: 14 MMOL/L (ref 4–16)
AST SERPL-CCNC: 25 IU/L (ref 15–37)
ATYPICAL LYMPHOCYTE ABSOLUTE COUNT: ABNORMAL
BANDED NEUTROPHILS ABSOLUTE COUNT: 0.06 K/CU MM
BANDED NEUTROPHILS RELATIVE PERCENT: 1 % (ref 5–11)
BASOPHILS ABSOLUTE: 0.1 K/CU MM
BASOPHILS RELATIVE PERCENT: 1 % (ref 0–1)
BILIRUB SERPL-MCNC: 0.2 MG/DL (ref 0–1)
BUN BLDV-MCNC: 22 MG/DL (ref 6–23)
CALCIUM SERPL-MCNC: 9.3 MG/DL (ref 8.3–10.6)
CHLORIDE BLD-SCNC: 104 MMOL/L (ref 99–110)
CHOLESTEROL: 195 MG/DL
CO2: 24 MMOL/L (ref 21–32)
CREAT SERPL-MCNC: 0.9 MG/DL (ref 0.9–1.3)
DIFFERENTIAL TYPE: ABNORMAL
DOSE AMOUNT: ABNORMAL
DOSE TIME: ABNORMAL
EOSINOPHILS ABSOLUTE: 0.7 K/CU MM
EOSINOPHILS RELATIVE PERCENT: 11 % (ref 0–3)
GFR AFRICAN AMERICAN: >60 ML/MIN/1.73M2
GFR NON-AFRICAN AMERICAN: >60 ML/MIN/1.73M2
GLUCOSE BLD-MCNC: 93 MG/DL (ref 70–99)
HCT VFR BLD CALC: 46.8 % (ref 42–52)
HDLC SERPL-MCNC: 43 MG/DL
HEMOGLOBIN: 15.8 GM/DL (ref 13.5–18)
LDL CHOLESTEROL DIRECT: 119 MG/DL
LYMPHOCYTES ABSOLUTE: 3.5 K/CU MM
LYMPHOCYTES RELATIVE PERCENT: 55 % (ref 24–44)
MCH RBC QN AUTO: 33.5 PG (ref 27–31)
MCHC RBC AUTO-ENTMCNC: 33.8 % (ref 32–36)
MCV RBC AUTO: 99.4 FL (ref 78–100)
MONOCYTES ABSOLUTE: 0.5 K/CU MM
MONOCYTES RELATIVE PERCENT: 8 % (ref 0–4)
PDW BLD-RTO: 11.4 % (ref 11.7–14.9)
PHENYTOIN LEVEL: 8.9 UG/ML (ref 10–20)
PLATELET # BLD: 242 K/CU MM (ref 140–440)
PMV BLD AUTO: 9.7 FL (ref 7.5–11.1)
POTASSIUM SERPL-SCNC: 4.3 MMOL/L (ref 3.5–5.1)
RBC # BLD: 4.71 M/CU MM (ref 4.6–6.2)
SEGMENTED NEUTROPHILS ABSOLUTE COUNT: 1.5 K/CU MM
SEGMENTED NEUTROPHILS RELATIVE PERCENT: 24 % (ref 36–66)
SODIUM BLD-SCNC: 142 MMOL/L (ref 135–145)
TOTAL CK: 115 IU/L (ref 38–174)
TOTAL PROTEIN: 7.2 GM/DL (ref 6.4–8.2)
TRIGL SERPL-MCNC: 274 MG/DL
WBC # BLD: 6.4 K/CU MM (ref 4–10.5)

## 2019-09-18 PROCEDURE — 82550 ASSAY OF CK (CPK): CPT

## 2019-09-18 PROCEDURE — 36415 COLL VENOUS BLD VENIPUNCTURE: CPT

## 2019-09-18 PROCEDURE — 85007 BL SMEAR W/DIFF WBC COUNT: CPT

## 2019-09-18 PROCEDURE — 80185 ASSAY OF PHENYTOIN TOTAL: CPT

## 2019-09-18 PROCEDURE — 80053 COMPREHEN METABOLIC PANEL: CPT

## 2019-09-18 PROCEDURE — 83721 ASSAY OF BLOOD LIPOPROTEIN: CPT

## 2019-09-18 PROCEDURE — 80061 LIPID PANEL: CPT

## 2019-09-18 PROCEDURE — 85027 COMPLETE CBC AUTOMATED: CPT

## 2020-07-13 ENCOUNTER — HOSPITAL ENCOUNTER (OUTPATIENT)
Age: 55
Discharge: HOME | End: 2020-07-13
Payer: MEDICARE

## 2020-07-13 LAB
ALBUMIN SERPL-MCNC: 4.6 GM/DL (ref 3.4–5)
ALP BLD-CCNC: 91 IU/L (ref 40–128)
ALT SERPL-CCNC: 17 U/L (ref 10–40)
ANION GAP SERPL CALCULATED.3IONS-SCNC: 15 MMOL/L (ref 4–16)
AST SERPL-CCNC: 21 IU/L (ref 15–37)
BASOPHILS ABSOLUTE: 0.1 K/CU MM
BASOPHILS RELATIVE PERCENT: 1.9 % (ref 0–1)
BILIRUB SERPL-MCNC: 0.2 MG/DL (ref 0–1)
BUN BLDV-MCNC: 19 MG/DL (ref 6–23)
CALCIUM SERPL-MCNC: 9.6 MG/DL (ref 8.3–10.6)
CHLORIDE BLD-SCNC: 105 MMOL/L (ref 99–110)
CHOLESTEROL: 125 MG/DL
CO2: 21 MMOL/L (ref 21–32)
CREAT SERPL-MCNC: 1 MG/DL (ref 0.9–1.3)
CREATININE URINE: 281.9 MG/DL (ref 39–259)
DIFFERENTIAL TYPE: ABNORMAL
EOSINOPHILS ABSOLUTE: 0.2 K/CU MM
EOSINOPHILS RELATIVE PERCENT: 3.2 % (ref 0–3)
ESTIMATED AVERAGE GLUCOSE: 108 MG/DL
GFR AFRICAN AMERICAN: >60 ML/MIN/1.73M2
GFR NON-AFRICAN AMERICAN: >60 ML/MIN/1.73M2
GLUCOSE BLD-MCNC: 108 MG/DL (ref 70–99)
HBA1C MFR BLD: 5.4 % (ref 4.2–6.3)
HCT VFR BLD CALC: 52.1 % (ref 42–52)
HDLC SERPL-MCNC: 40 MG/DL
HEMOGLOBIN: 16.3 GM/DL (ref 13.5–18)
IMMATURE NEUTROPHIL %: 0.2 % (ref 0–0.43)
LDL CHOLESTEROL DIRECT: 59 MG/DL
LYMPHOCYTES ABSOLUTE: 2.3 K/CU MM
LYMPHOCYTES RELATIVE PERCENT: 41.8 % (ref 24–44)
MCH RBC QN AUTO: 33.5 PG (ref 27–31)
MCHC RBC AUTO-ENTMCNC: 31.3 % (ref 32–36)
MCV RBC AUTO: 107 FL (ref 78–100)
MICROALBUMIN/CREAT 24H UR: <1.2 MG/DL
MICROALBUMIN/CREAT UR-RTO: ABNORMAL MG/G CREAT (ref 0–30)
MONOCYTES ABSOLUTE: 0.5 K/CU MM
MONOCYTES RELATIVE PERCENT: 8.7 % (ref 0–4)
NUCLEATED RBC %: 0 %
PDW BLD-RTO: 11.8 % (ref 11.7–14.9)
PLATELET # BLD: 223 K/CU MM (ref 140–440)
PMV BLD AUTO: 9.5 FL (ref 7.5–11.1)
POTASSIUM SERPL-SCNC: 4.3 MMOL/L (ref 3.5–5.1)
RBC # BLD: 4.87 M/CU MM (ref 4.6–6.2)
SEGMENTED NEUTROPHILS ABSOLUTE COUNT: 2.4 K/CU MM
SEGMENTED NEUTROPHILS RELATIVE PERCENT: 44.2 % (ref 36–66)
SODIUM BLD-SCNC: 141 MMOL/L (ref 135–145)
TOTAL CK: 93 IU/L (ref 38–174)
TOTAL IMMATURE NEUTOROPHIL: 0.01 K/CU MM
TOTAL NUCLEATED RBC: 0 K/CU MM
TOTAL PROTEIN: 7.5 GM/DL (ref 6.4–8.2)
TRIGL SERPL-MCNC: 206 MG/DL
WBC # BLD: 5.4 K/CU MM (ref 4–10.5)

## 2020-07-13 PROCEDURE — 80053 COMPREHEN METABOLIC PANEL: CPT

## 2020-07-13 PROCEDURE — 36415 COLL VENOUS BLD VENIPUNCTURE: CPT

## 2020-07-13 PROCEDURE — 85025 COMPLETE CBC W/AUTO DIFF WBC: CPT

## 2020-07-13 PROCEDURE — 82570 ASSAY OF URINE CREATININE: CPT

## 2020-07-13 PROCEDURE — 83721 ASSAY OF BLOOD LIPOPROTEIN: CPT

## 2020-07-13 PROCEDURE — 82550 ASSAY OF CK (CPK): CPT

## 2020-07-13 PROCEDURE — 82043 UR ALBUMIN QUANTITATIVE: CPT

## 2020-07-13 PROCEDURE — 80061 LIPID PANEL: CPT

## 2020-07-13 PROCEDURE — 83036 HEMOGLOBIN GLYCOSYLATED A1C: CPT

## 2020-11-12 ENCOUNTER — HOSPITAL ENCOUNTER (OUTPATIENT)
Age: 55
Setting detail: SPECIMEN
Discharge: HOME | End: 2020-11-12
Payer: MEDICARE

## 2020-11-12 LAB
ALBUMIN SERPL-MCNC: 4.3 GM/DL (ref 3.4–5)
ALP BLD-CCNC: 63 IU/L (ref 40–128)
ALT SERPL-CCNC: 13 U/L (ref 10–40)
ANION GAP SERPL CALCULATED.3IONS-SCNC: 10 MMOL/L (ref 4–16)
AST SERPL-CCNC: 18 IU/L (ref 15–37)
BILIRUB SERPL-MCNC: 0.2 MG/DL (ref 0–1)
BUN BLDV-MCNC: 15 MG/DL (ref 6–23)
CALCIUM SERPL-MCNC: 8.8 MG/DL (ref 8.3–10.6)
CARBAMAZEPINE LEVEL: <2 UG/ML (ref 5–9)
CHLORIDE BLD-SCNC: 99 MMOL/L (ref 99–110)
CHOLESTEROL: 120 MG/DL
CO2: 25 MMOL/L (ref 21–32)
CREAT SERPL-MCNC: 1 MG/DL (ref 0.9–1.3)
DOSE AMOUNT: ABNORMAL
DOSE TIME: ABNORMAL
ESTIMATED AVERAGE GLUCOSE: 108 MG/DL
GFR AFRICAN AMERICAN: >60 ML/MIN/1.73M2
GFR NON-AFRICAN AMERICAN: >60 ML/MIN/1.73M2
GLUCOSE BLD-MCNC: 96 MG/DL (ref 70–99)
HBA1C MFR BLD: 5.4 % (ref 4.2–6.3)
HDLC SERPL-MCNC: 50 MG/DL
LDL CHOLESTEROL DIRECT: 62 MG/DL
POTASSIUM SERPL-SCNC: 4.1 MMOL/L (ref 3.5–5.1)
SODIUM BLD-SCNC: 134 MMOL/L (ref 135–145)
TOTAL CK: 105 IU/L (ref 38–174)
TOTAL PROTEIN: 6.6 GM/DL (ref 6.4–8.2)
TRIGL SERPL-MCNC: 94 MG/DL

## 2020-11-12 PROCEDURE — 80053 COMPREHEN METABOLIC PANEL: CPT

## 2020-11-12 PROCEDURE — 80061 LIPID PANEL: CPT

## 2020-11-12 PROCEDURE — 83036 HEMOGLOBIN GLYCOSYLATED A1C: CPT

## 2020-11-12 PROCEDURE — 80156 ASSAY CARBAMAZEPINE TOTAL: CPT

## 2020-11-12 PROCEDURE — 83721 ASSAY OF BLOOD LIPOPROTEIN: CPT

## 2020-11-12 PROCEDURE — 82550 ASSAY OF CK (CPK): CPT

## 2021-03-15 ENCOUNTER — HOSPITAL ENCOUNTER (EMERGENCY)
Age: 56
Discharge: HOME | End: 2021-03-15
Attending: EMERGENCY MEDICINE
Payer: MEDICARE

## 2021-03-15 ENCOUNTER — APPOINTMENT (OUTPATIENT)
Dept: CT IMAGING | Age: 56
End: 2021-03-15
Payer: MEDICARE

## 2021-03-15 ENCOUNTER — APPOINTMENT (OUTPATIENT)
Dept: GENERAL RADIOLOGY | Age: 56
End: 2021-03-15
Payer: MEDICARE

## 2021-03-15 VITALS
RESPIRATION RATE: 21 BRPM | WEIGHT: 220 LBS | OXYGEN SATURATION: 99 % | BODY MASS INDEX: 30.8 KG/M2 | HEART RATE: 77 BPM | DIASTOLIC BLOOD PRESSURE: 81 MMHG | TEMPERATURE: 98.3 F | SYSTOLIC BLOOD PRESSURE: 124 MMHG | HEIGHT: 71 IN

## 2021-03-15 DIAGNOSIS — G40.919 BREAKTHROUGH SEIZURE (HCC): Primary | ICD-10-CM

## 2021-03-15 DIAGNOSIS — Z51.81 SUBTHERAPEUTIC PHENYTOIN LEVEL: ICD-10-CM

## 2021-03-15 LAB
ALBUMIN SERPL-MCNC: 4.1 GM/DL (ref 3.4–5)
ALP BLD-CCNC: 83 IU/L (ref 40–129)
ALT SERPL-CCNC: 18 U/L (ref 10–40)
ANION GAP SERPL CALCULATED.3IONS-SCNC: 13 MMOL/L (ref 4–16)
AST SERPL-CCNC: 22 IU/L (ref 15–37)
BACTERIA: NEGATIVE /HPF
BASE EXCESS MIXED: 0.3 (ref 0–1.2)
BASOPHILS ABSOLUTE: 0.1 K/CU MM
BASOPHILS RELATIVE PERCENT: 0.6 % (ref 0–1)
BILIRUB SERPL-MCNC: 0.2 MG/DL (ref 0–1)
BILIRUBIN URINE: NEGATIVE MG/DL
BLOOD, URINE: NEGATIVE
BUN BLDV-MCNC: 21 MG/DL (ref 6–23)
CALCIUM SERPL-MCNC: 9.5 MG/DL (ref 8.3–10.6)
CHLORIDE BLD-SCNC: 102 MMOL/L (ref 99–110)
CLARITY: ABNORMAL
CO2: 25 MMOL/L (ref 21–32)
COLOR: YELLOW
COMMENT: ABNORMAL
CREAT SERPL-MCNC: 1.1 MG/DL (ref 0.9–1.3)
DIFFERENTIAL TYPE: ABNORMAL
DOSE AMOUNT: ABNORMAL
DOSE TIME: ABNORMAL
EOSINOPHILS ABSOLUTE: 0.3 K/CU MM
EOSINOPHILS RELATIVE PERCENT: 2.8 % (ref 0–3)
GFR AFRICAN AMERICAN: >60 ML/MIN/1.73M2
GFR NON-AFRICAN AMERICAN: >60 ML/MIN/1.73M2
GLUCOSE BLD-MCNC: 100 MG/DL (ref 70–99)
GLUCOSE, URINE: NEGATIVE MG/DL
GRANULAR CASTS: 5 /LPF
HCO3 VENOUS: 23.5 MMOL/L (ref 19–25)
HCT VFR BLD CALC: 42.4 % (ref 42–52)
HEMOGLOBIN: 14.9 GM/DL (ref 13.5–18)
HYALINE CASTS: 2 /LPF
IMMATURE NEUTROPHIL %: 0.7 % (ref 0–0.43)
KETONES, URINE: NEGATIVE MG/DL
LACTATE: 2.1 MMOL/L (ref 0.4–2)
LACTATE: 3.7 MMOL/L (ref 0.4–2)
LEUKOCYTE ESTERASE, URINE: NEGATIVE
LYMPHOCYTES ABSOLUTE: 1.7 K/CU MM
LYMPHOCYTES RELATIVE PERCENT: 17.9 % (ref 24–44)
MCH RBC QN AUTO: 33.7 PG (ref 27–31)
MCHC RBC AUTO-ENTMCNC: 35.1 % (ref 32–36)
MCV RBC AUTO: 95.9 FL (ref 78–100)
MONOCYTES ABSOLUTE: 0.7 K/CU MM
MONOCYTES RELATIVE PERCENT: 7.2 % (ref 0–4)
MUCUS: ABNORMAL HPF
NITRITE URINE, QUANTITATIVE: NEGATIVE
NUCLEATED RBC %: 0 %
O2 SAT, VEN: 93.9 % (ref 50–70)
PCO2, VEN: 33 MMHG (ref 38–52)
PDW BLD-RTO: 11.4 % (ref 11.7–14.9)
PH VENOUS: 7.46 (ref 7.32–7.42)
PH, URINE: 5 (ref 5–8)
PHENYTOIN LEVEL: 7.5 UG/ML (ref 10–20)
PLATELET # BLD: 236 K/CU MM (ref 140–440)
PMV BLD AUTO: 9.1 FL (ref 7.5–11.1)
PO2, VEN: 173 MMHG (ref 28–48)
POTASSIUM SERPL-SCNC: 3.9 MMOL/L (ref 3.5–5.1)
PRO-BNP: 48.94 PG/ML
PROTEIN UA: NEGATIVE MG/DL
RBC # BLD: 4.42 M/CU MM (ref 4.6–6.2)
RBC URINE: <1 /HPF (ref 0–3)
SARS-COV-2, NAAT: NOT DETECTED
SEGMENTED NEUTROPHILS ABSOLUTE COUNT: 6.8 K/CU MM
SEGMENTED NEUTROPHILS RELATIVE PERCENT: 70.8 % (ref 36–66)
SODIUM BLD-SCNC: 140 MMOL/L (ref 135–145)
SPECIFIC GRAVITY UA: 1.02 (ref 1–1.03)
TOTAL IMMATURE NEUTOROPHIL: 0.07 K/CU MM
TOTAL NUCLEATED RBC: 0 K/CU MM
TOTAL PROTEIN: 6.7 GM/DL (ref 6.4–8.2)
TRICHOMONAS: ABNORMAL /HPF
TROPONIN T: <0.01 NG/ML
UROBILINOGEN, URINE: NEGATIVE MG/DL (ref 0.2–1)
WBC # BLD: 9.7 K/CU MM (ref 4–10.5)
WBC UA: <1 /HPF (ref 0–2)

## 2021-03-15 PROCEDURE — 84484 ASSAY OF TROPONIN QUANT: CPT

## 2021-03-15 PROCEDURE — 96361 HYDRATE IV INFUSION ADD-ON: CPT

## 2021-03-15 PROCEDURE — 82805 BLOOD GASES W/O2 SATURATION: CPT

## 2021-03-15 PROCEDURE — 2580000003 HC RX 258: Performed by: EMERGENCY MEDICINE

## 2021-03-15 PROCEDURE — 81001 URINALYSIS AUTO W/SCOPE: CPT

## 2021-03-15 PROCEDURE — 85025 COMPLETE CBC W/AUTO DIFF WBC: CPT

## 2021-03-15 PROCEDURE — 70450 CT HEAD/BRAIN W/O DYE: CPT

## 2021-03-15 PROCEDURE — 83880 ASSAY OF NATRIURETIC PEPTIDE: CPT

## 2021-03-15 PROCEDURE — 93005 ELECTROCARDIOGRAM TRACING: CPT | Performed by: EMERGENCY MEDICINE

## 2021-03-15 PROCEDURE — 71045 X-RAY EXAM CHEST 1 VIEW: CPT

## 2021-03-15 PROCEDURE — 6360000002 HC RX W HCPCS: Performed by: EMERGENCY MEDICINE

## 2021-03-15 PROCEDURE — 96365 THER/PROPH/DIAG IV INF INIT: CPT

## 2021-03-15 PROCEDURE — 80053 COMPREHEN METABOLIC PANEL: CPT

## 2021-03-15 PROCEDURE — 87040 BLOOD CULTURE FOR BACTERIA: CPT

## 2021-03-15 PROCEDURE — 96366 THER/PROPH/DIAG IV INF ADDON: CPT

## 2021-03-15 PROCEDURE — 83605 ASSAY OF LACTIC ACID: CPT

## 2021-03-15 PROCEDURE — 87635 SARS-COV-2 COVID-19 AMP PRB: CPT

## 2021-03-15 PROCEDURE — 80185 ASSAY OF PHENYTOIN TOTAL: CPT

## 2021-03-15 PROCEDURE — 93010 ELECTROCARDIOGRAM REPORT: CPT | Performed by: INTERNAL MEDICINE

## 2021-03-15 PROCEDURE — 99285 EMERGENCY DEPT VISIT HI MDM: CPT

## 2021-03-15 RX ORDER — PHENYTOIN SODIUM 100 MG/1
400 CAPSULE, EXTENDED RELEASE ORAL EVERY MORNING
COMMUNITY

## 2021-03-15 RX ORDER — 0.9 % SODIUM CHLORIDE 0.9 %
1000 INTRAVENOUS SOLUTION INTRAVENOUS ONCE
Status: COMPLETED | OUTPATIENT
Start: 2021-03-15 | End: 2021-03-15

## 2021-03-15 RX ORDER — LEVOFLOXACIN 5 MG/ML
500 INJECTION, SOLUTION INTRAVENOUS ONCE
Status: COMPLETED | OUTPATIENT
Start: 2021-03-15 | End: 2021-03-15

## 2021-03-15 RX ORDER — OMEPRAZOLE 20 MG/1
20 CAPSULE, DELAYED RELEASE ORAL DAILY
COMMUNITY

## 2021-03-15 RX ORDER — LEVOFLOXACIN 500 MG/1
500 TABLET, FILM COATED ORAL DAILY
Qty: 10 TABLET | Refills: 0 | Status: SHIPPED | OUTPATIENT
Start: 2021-03-15 | End: 2021-03-25

## 2021-03-15 RX ORDER — FENOFIBRATE 160 MG/1
160 TABLET ORAL DAILY
COMMUNITY
End: 2021-05-12

## 2021-03-15 RX ORDER — RISPERIDONE 1 MG/1
1 TABLET, FILM COATED ORAL NIGHTLY
COMMUNITY

## 2021-03-15 RX ORDER — FLUVOXAMINE MALEATE 100 MG
200 TABLET ORAL NIGHTLY
COMMUNITY

## 2021-03-15 RX ORDER — NYSTATIN 100000 U/G
CREAM TOPICAL 2 TIMES DAILY
COMMUNITY
End: 2022-03-29 | Stop reason: ALTCHOICE

## 2021-03-15 RX ORDER — ATORVASTATIN CALCIUM 10 MG/1
10 TABLET, FILM COATED ORAL NIGHTLY
COMMUNITY
End: 2021-05-12

## 2021-03-15 RX ADMIN — SODIUM CHLORIDE 1000 ML: 9 INJECTION, SOLUTION INTRAVENOUS at 10:27

## 2021-03-15 RX ADMIN — LEVOFLOXACIN 500 MG: 5 INJECTION, SOLUTION INTRAVENOUS at 13:41

## 2021-03-15 NOTE — ED NOTES
Discharge instructions reviewed with pt and questions addressed at this time. Pt alert and oriented x 4 at time of discharge, no signs of acute distress noted. Pt ambulatory to Emergency Department waiting room and steady gait noted.         Sakina Rivero RN  03/15/21 0422

## 2021-03-15 NOTE — ED TRIAGE NOTES
Staff at [facility] reported patient was sitting and began to have a seizure they lowered him to the floor. EMS reports patient was post ictal on arrival and on arrival to ED patient is more alert and appropriate.

## 2021-03-15 NOTE — ED PROVIDER NOTES
Triage Chief Complaint:   Seizures    [place]:  Mindy Prather is a 63 y.o. male that presents with seizure activity. Patient was in baseline state of health until this morning when he was sitting on his bed and his group home staff were in his room and he began to have seizure-like activity. Seizure activities were related as generalized shaking activity and unresponsiveness. Patient was thankfully lowered to the ground without any traumatic injury. EMS was called. On EMS arrival they reports that patient was sonorous and that his mental status slowly improved in the squad on the way to the hospital.  Per group home report it has been a number of years since patient has had a seizure. 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 They report otherwise he has been doing well. No recent illnesses. Patient was recently started on Risperdal for \"behavior issues\". But this is the only change his medication regimen. Patient is on Lamictal and phenytoin for anticonvulsants long-term. Patient on arrival was able to tell me his name. Patient denies any pain symptoms. No numbness or weakness. No vision changes. Per group home, patient is normally ambulatory and conversant.   Patient is actively joking with me on arrival.    ROS:  General:  No fevers, no chills, no weakness  Eyes:  No recent vison changes, no discharge  ENT:  No sore throat, no nasal congestion, no hearing changes  Cardiovascular:  No chest pain, no palpitations  Respiratory:  No shortness of breath, no cough, no wheezing  Gastrointestinal:  No pain, no nausea, no vomiting, no diarrhea  Musculoskeletal:  No muscle pain, no joint pain  Skin:  No rash, no pruritis, no easy bruising  Neurologic:  No speech problems, no headache, no extremity numbness, no extremity tingling, no extremity weakness, + seizure activity  Psychiatric:  No anxiety  Genitourinary:  No dysuria, no hematuria  Endocrine:  No unexpected weight gain, no unexpected weight loss  Extremities:  no edema, no pain    Past Medical History:   Diagnosis Date    Brain injury ([facility] [address]     GERD (gastroesophageal reflux disease)     Hyperlipidemia     Hypertension     Seizures ([facility] [address]      History reviewed. No pertinent surgical history. History reviewed. No pertinent family history. Social History     Socioeconomic History    Marital status: Single     Spouse name: Not on file    Number of children: Not on file    Years of education: Not on file    Highest education level: Not on file   Occupational History    Not on file   Social Needs    Financial resource strain: Not on file    Food insecurity     Worry: Not on file     Inability: Not on file    Transportation needs     Medical: Not on file     Non-medical: Not on file   Tobacco Use    Smoking status: Current Every Day Smoker     Packs/day: 0.50    Smokeless tobacco: Never Used   Substance and Sexual Activity    Alcohol use: No    Drug use: No    Sexual activity: Not on file   Lifestyle    Physical activity     Days per week: Not on file     Minutes per session: Not on file    Stress: Not on file   Relationships    Social connections     Talks on phone: Not on file     Gets together: Not on file     Attends Yazidi service: Not on file     Active member of club or organization: Not on file     Attends meetings of clubs or organizations: Not on file     Relationship status: Not on file    Intimate partner violence     Fear of current or ex partner: Not on file     Emotionally abused: Not on file     Physically abused: Not on file     Forced sexual activity: Not on file   Other Topics Concern    Not on file   Social History Narrative    Not on file     No current facility-administered medications for this encounter. Current Outpatient Medications   Medication Sig Dispense Refill    nystatin (MYCOSTATIN) 647473 UNIT/GM cream Apply topically 2 times daily Apply topically 2 times daily.       risperiDONE (RISPERDAL) 1 MG tablet Take 1 mg by mouth nightly      phenytoin (DILANTIN) 100 MG ER capsule Take 400 mg by mouth every morning      fenofibrate (TRIGLIDE) 160 MG tablet Take 160 mg by mouth daily      atorvastatin (LIPITOR) 10 MG tablet Take 10 mg by mouth nightly      omeprazole (PRILOSEC) 20 MG delayed release capsule Take 20 mg by mouth daily      fluvoxaMINE (LUVOX) 100 MG tablet Take 200 mg by mouth nightly      levoFLOXacin (LEVAQUIN) 500 MG tablet Take 1 tablet by mouth daily for 10 days 10 tablet 0    Multiple Vitamins-Minerals (THERAPEUTIC MULTIVITAMIN-MINERALS) tablet Take 1 tablet by mouth daily      busPIRone (BUSPAR) 30 MG tablet Take 30 mg by mouth 2 times daily       lamoTRIgine (LAMICTAL) 100 MG tablet Take 100 mg by mouth 2 times daily       fluvoxamine (LUVOX) 100 MG tablet Take 100 mg by mouth every morning 1 tab in AM & 2 tabs @@ HS       clonazePAM (KLONOPIN) 0.5 MG tablet Take 0.5 mg by mouth three times daily. Allergies   Allergen Reactions    Amoxicillin Other (See Comments)     unknown    Bactrim [Sulfamethoxazole-Trimethoprim]        Nursing Notes Reviewed    Physical Exam:  ED Triage Vitals [03/15/21 0937]   Enc Vitals Group      /77      Pulse 100      Resp 22      Temp 98.3 °F (36.8 °C)      Temp Source Oral      SpO2 (!) 88 %      Weight 220 lb (99.8 kg)      Height 5' 11\" (1.803 m)      Head Circumference       Peak Flow       Pain Score       Pain Loc       Pain Edu? Excl. in [address]? My pulse ox interpretation is -88% on room air correcting to 95% on 2 L nasal cannula    General appearance:  No acute distress. Resting comfortably in bed. Appears well. Pleasant. Skin:  Warm. Dry. Eye:  Extraocular movements intact. Pupils are midrange equally reactive to light. Ears, nose, mouth and throat:  Oral mucosa moist   Neck:  Trachea midline. Extremity:  No swelling.   Normal ROM     Heart: Slightly tachycardic but regular, normal S1 & S2, no extra heart sounds. Perfusion:  Intact. Respiratory:  Lungs clear to auscultation bilaterally. Respirations nonlabored. Speaking clearly in full sentences. No respiratory distress. Abdominal:  Normal bowel sounds. Soft. Nontender. Non distended. Back:  No CVA tenderness to palpation     Neurological:  Alert and oriented times 3. No focal neuro deficits.  Sensation intact to light touch to distal upper/lower extremities; 5/5 and symmetric shrug, , HF, KF/KE, and dorsi/plantar flexion; symmetric brow raise and nasolabial folds, tongue midline; FTN and CHRISTELLE intact; 2+ and symmetric reflexes to bilateral upper/lower extremities; no dysarthria or aphasia            Psychiatric:  Appropriate    I have reviewed and interpreted all of the currently available lab results from this visit (if applicable):  Results for orders placed or performed during the hospital encounter of 03/15/21   COVID-19, Rapid    Specimen: Nasopharyngeal   Result Value Ref Range    SARS-CoV-2, NAAT NOT DETECTED    CBC auto diff   Result Value Ref Range    WBC 9.7 4.0 - 10.5 K/CU MM    RBC 4.42 (L) 4.6 - 6.2 M/CU MM    Hemoglobin 14.9 13.5 - 18.0 GM/DL    Hematocrit 42.4 42 - 52 %    MCV 95.9 78 - 100 FL    MCH 33.7 (H) 27 - 31 PG    MCHC 35.1 32.0 - 36.0 %    RDW 11.4 (L) 11.7 - 14.9 %    Platelets 126 776 - 273 K/CU MM    MPV 9.1 7.5 - 11.1 FL    Differential Type AUTOMATED DIFFERENTIAL     Segs Relative 70.8 (H) 36 - 66 %    Lymphocytes % 17.9 (L) 24 - 44 %    Monocytes % 7.2 (H) 0 - 4 %    Eosinophils % 2.8 0 - 3 %    Basophils % 0.6 0 - 1 %    Segs Absolute 6.8 K/CU MM    Lymphocytes Absolute 1.7 K/CU MM    Monocytes Absolute 0.7 K/CU MM    Eosinophils Absolute 0.3 K/CU MM    Basophils Absolute 0.1 K/CU MM    Nucleated RBC % 0.0 %    Total Nucleated RBC 0.0 K/CU MM    Total Immature Neutrophil 0.07 K/CU MM    Immature Neutrophil % 0.7 (H) 0 - 0.43 %   CMP   Result Value Ref Range    Sodium 140 135 - 145 MMOL/L    Potassium 3.9 3.5 - 5.1 MMOL/L    Chloride 102 99 - 110 mMol/L    CO2 25 21 - 32 MMOL/L    BUN 21 6 - 23 MG/DL    CREATININE 1.1 0.9 - 1.3 MG/DL    Glucose 100 (H) 70 - 99 MG/DL    Calcium 9.5 8.3 - 10.6 MG/DL    Albumin 4.1 3.4 - 5.0 GM/DL    Total Protein 6.7 6.4 - 8.2 GM/DL    Total Bilirubin 0.2 0.0 - 1.0 MG/DL    ALT 18 10 - 40 U/L    AST 22 15 - 37 IU/L    Alkaline Phosphatase 83 40 - 129 IU/L    GFR Non-African American >60 >60 mL/min/1.73m2    GFR African American >60 >60 mL/min/1.73m2    Anion Gap 13 4 - 16   Blood Gas, Venous   Result Value Ref Range    pH, Jong 7.46 (H) 7.32 - 7.42    pCO2, Jong 33 (L) 38 - 52 mmHG    pO2, Jong 173 (H) 28 - 48 mmHG    Base Exc, Mixed . 3 0 - 1.2    HCO3, Venous 23.5 19 - 25 MMOL/L    O2 Sat, Jong 93.9 (H) 50 - 70 %    Comment VBG    Lactic Acid, Plasma   Result Value Ref Range    Lactate 3.7 (HH) 0.4 - 2.0 mMOL/L   Troponin   Result Value Ref Range    Troponin T <0.010 <0.01 NG/ML   Brain Natriuretic Peptide   Result Value Ref Range    Pro-BNP 48.94 <300 PG/ML   Urinalysis   Result Value Ref Range    Color, UA YELLOW YELLOW    Clarity, UA HAZY (A) CLEAR    Glucose, Urine NEGATIVE NEGATIVE MG/DL    Bilirubin Urine NEGATIVE NEGATIVE MG/DL    Ketones, Urine NEGATIVE NEGATIVE MG/DL    Specific Gravity, UA 1.023 1.001 - 1.035    Blood, Urine NEGATIVE NEGATIVE    pH, Urine 5.0 5.0 - 8.0    Protein, UA NEGATIVE NEGATIVE MG/DL    Urobilinogen, Urine NEGATIVE 0.2 - 1.0 MG/DL    Nitrite Urine, Quantitative NEGATIVE NEGATIVE    Leukocyte Esterase, Urine NEGATIVE NEGATIVE    RBC, UA <1 0 - 3 /HPF    WBC, UA <1 0 - 2 /HPF    Bacteria, UA NEGATIVE NEGATIVE /HPF    Mucus, UA RARE (A) NEGATIVE HPF    Trichomonas, UA NONE SEEN NONE SEEN /HPF    Hyaline Casts, UA 2 /LPF    Granular Casts, UA 5 /LPF   Phenytoin Level, total   Result Value Ref Range    Phenytoin Lvl 7.5 (L) 10 - 20 UG/ML    DOSE AMOUNT DOSE AMT.  GIVEN - UNKNOWN     DOSE TIME DOSE TIME GIVEN - UNKNOWN    Lactic Acid, Plasma   Result Value Ref Range Lactate 2.1 (HH) 0.4 - 2.0 mMOL/L   EKG 12 Lead   Result Value Ref Range    Ventricular Rate 101 BPM    Atrial Rate 101 BPM    P-R Interval 184 ms    QRS Duration 88 ms    Q-T Interval 352 ms    QTc Calculation (Bazett) 456 ms    P Axis 65 degrees    R Axis 51 degrees    T Axis 60 degrees    Diagnosis       Sinus tachycardia  Otherwise normal ECG  No previous ECGs available        Radiographs (if obtained):  [] The following radiograph was interpreted by myself in the absence of a radiologist:   [x] Radiologist's Report Reviewed:  XR CHEST PORTABLE   Final Result   1. Hazy bilateral lung opacities may represent atypical/viral pneumonia or   pulmonary edema. CT HEAD WO CONTRAST   Final Result   No acute intracranial abnormality. Otherwise stable CT. EKG (if obtained): (All EKG's are interpreted by myself in the absence of a cardiologist)  12 lead EKG per my interpretation:  Sinus Tachycardia at 101  Axis is   Normal  QTc is  within an acceptable range  There is no specific T wave changes appreciated. There is no specific ST wave changes appreciated. No STEMI    Prior EKG to compare with was NOT available. Chart review shows recent radiographs:  No results found. MDM:  Pt presents as above. Emergent conditions considered. Presentation prompted initial labs, EKG and imaging. IVs established and patient is placed on monitor. Patient is found to be hypoxemic to 88% correcting with nasal cannula oxygen of 2 L. Rapid Covid is negative. CBC is without clinically significant derangement. CMP is with mild hyperglycemia without acidosis. VBG is with mild alkalemia. Lactic is elevated at 3.7 suggestive of some degree of hypoperfusion however patient also status post seizure and this is likely driving this. Phenytoin level is sent and is low. CT head is negative. Chest x-ray does demonstrate likely atypical/viral pneumonia or pulmonary edema.   On reassessment patient's oxygen is weaned off and his oxygen level is persistently in the mid 90s. I do believe patient likely had some mucous plugging and did some forceful coughing and this cleared. Patient is observed for several hours in the emergency department and remained stable and at baseline with no further seizures or any oxygen needs. I will cover patient with Levaquin with first dose given in the emergency department. I do believe patient's breakthrough seizure is multifactorial including his likely pneumonia as well as his subtherapeutic phenytoin level. Patient will be discharged back to his group home in a monitored setting. I did encourage them to bring the patient back should he have any further seizure activity or any worsening shortness of breath or other concerning symptoms. I discussed specific signs and symptoms on when to return to the emergency department as well as the need for close outpatient follow-up. Questions sought and answered with the patient and caregivers. They voice understanding and agree with plan. Clinical Impression:  1. Breakthrough seizure ([address]    2. Subtherapeutic phenytoin level      Disposition referral (if applicable):  Kathrin Bardales MD  [address] [address] [address]  [phone]    Schedule an appointment as soon as possible for a visit       [facility] Emergency Department  [place] [id number] [phone]  Today  If symptoms worsen    Disposition medications (if applicable):  New Prescriptions    LEVOFLOXACIN (LEVAQUIN) 500 MG TABLET    Take 1 tablet by mouth daily for 10 days       Comment: Please note this report has been produced using speech recognition software and may contain errors related to that system including errors in grammar, punctuation, and spelling, as well as words and phrases that may be inappropriate.  If there are any questions or concerns please feel free to contact the

## 2021-03-15 NOTE — PROGRESS NOTES
change, interval change, formulation change):  Clonazepam frequency change from 4 times daily to TID  Lamotrigine frequency change from daily to BID  Fluvoxamine dosage clarified to 100 mg q am and 200 mg at HS  Buspirone dosage change from 15 mg to 30 mg BID  Nystatin cream (added)  Risperidone (added)  Phenytoin susp changed to capsules 400 mg q am  Gemfibrozil changed to Fenofibrate   Atorvastatin (added)  Famotidine changed to Omeprazole     Medications removed from list (include reason, ex. noncompliance, medication cost, therapy complete etc.):   IBU not on MAR provided  Levetiracetam  not on MAR provided  MVI duplicate  Propranolol  not on MAR provided  Warfarin  not on MAR provided  Nicotine patch  not on MAR provided    Comments:  Medication MAR provided by group home and insurance claims verified.     To my knowledge the above medication history is accurate as of 3/15/2021 11:24 AM.   Wade Buck CPhT   3/15/2021 11:24 AM

## 2021-03-15 NOTE — LETTER
[facility] Emergency Department  [address] [address]  Phone: [phone]  Fax: [phone]         March 15, 2021     Patient: Sylvia Simpson   YOB: 1965   Date of Visit: 3/15/2021       To Whom It May Concern:    Vince Reyes was seen and treated in our emergency department on 3/15/2021. The following work duties are recommended. He may return to full duty immediately with no restrictions as long as he remains asymptomatic. Treatment and work recommendations given by the emergency department are initial emergency measures only, and follow up should be arranged as soon as possible with the company's occupational health provider* or the specialist to whom the worker was referred.     *If the company does not have an occupational health provider, follow up should be at Kathrin Bardales MD  [address] [facility] [address] [phone]    Schedule an appointment as soon as possible for a visit       [facility] Emergency Department  [address] [facility] [address] [phone]  Today  If symptoms worsen          Sincerely,        Mary Beth Eaton MD        Signature:__________________________________

## 2021-03-18 LAB
EKG ATRIAL RATE: 101 BPM
EKG DIAGNOSIS: NORMAL
EKG P AXIS: 65 DEGREES
EKG P-R INTERVAL: 184 MS
EKG Q-T INTERVAL: 352 MS
EKG QRS DURATION: 88 MS
EKG QTC CALCULATION (BAZETT): 456 MS
EKG R AXIS: 51 DEGREES
EKG T AXIS: 60 DEGREES
EKG VENTRICULAR RATE: 101 BPM

## 2021-03-20 LAB
CULTURE: NORMAL
Lab: NORMAL
SPECIMEN: NORMAL

## 2021-03-25 ENCOUNTER — HOSPITAL ENCOUNTER (EMERGENCY)
Age: 56
Discharge: HOME | End: 2021-03-26
Payer: MEDICARE

## 2021-03-25 ENCOUNTER — APPOINTMENT (OUTPATIENT)
Dept: CT IMAGING | Age: 56
End: 2021-03-25
Payer: MEDICARE

## 2021-03-25 DIAGNOSIS — I26.99 ACUTE PULMONARY EMBOLISM, UNSPECIFIED PULMONARY EMBOLISM TYPE, UNSPECIFIED WHETHER ACUTE COR PULMONALE PRESENT (HCC): Primary | ICD-10-CM

## 2021-03-25 LAB
ALBUMIN SERPL-MCNC: 4.4 GM/DL (ref 3.4–5)
ALP BLD-CCNC: 98 IU/L (ref 40–128)
ALT SERPL-CCNC: 16 U/L (ref 10–40)
ANION GAP SERPL CALCULATED.3IONS-SCNC: 10 MMOL/L (ref 4–16)
AST SERPL-CCNC: 22 IU/L (ref 15–37)
BACTERIA: NEGATIVE /HPF
BASOPHILS ABSOLUTE: 0 K/CU MM
BASOPHILS RELATIVE PERCENT: 0.3 % (ref 0–1)
BILIRUB SERPL-MCNC: 0.6 MG/DL (ref 0–1)
BILIRUBIN URINE: NEGATIVE MG/DL
BLOOD, URINE: NEGATIVE
BUN BLDV-MCNC: 15 MG/DL (ref 6–23)
CALCIUM SERPL-MCNC: 8.9 MG/DL (ref 8.3–10.6)
CHLORIDE BLD-SCNC: 100 MMOL/L (ref 99–110)
CLARITY: CLEAR
CO2: 28 MMOL/L (ref 21–32)
COLOR: ABNORMAL
CREAT SERPL-MCNC: 1.2 MG/DL (ref 0.9–1.3)
DIFFERENTIAL TYPE: ABNORMAL
EOSINOPHILS ABSOLUTE: 0.1 K/CU MM
EOSINOPHILS RELATIVE PERCENT: 0.7 % (ref 0–3)
GFR AFRICAN AMERICAN: >60 ML/MIN/1.73M2
GFR NON-AFRICAN AMERICAN: >60 ML/MIN/1.73M2
GLUCOSE BLD-MCNC: 108 MG/DL (ref 70–99)
GLUCOSE, URINE: NEGATIVE MG/DL
HCT VFR BLD CALC: 41.7 % (ref 42–52)
HEMOGLOBIN: 14.4 GM/DL (ref 13.5–18)
IMMATURE NEUTROPHIL %: 0.3 % (ref 0–0.43)
KETONES, URINE: NEGATIVE MG/DL
LEUKOCYTE ESTERASE, URINE: NEGATIVE
LIPASE: 22 IU/L (ref 13–60)
LYMPHOCYTES ABSOLUTE: 2.5 K/CU MM
LYMPHOCYTES RELATIVE PERCENT: 21.2 % (ref 24–44)
MCH RBC QN AUTO: 33.7 PG (ref 27–31)
MCHC RBC AUTO-ENTMCNC: 34.5 % (ref 32–36)
MCV RBC AUTO: 97.7 FL (ref 78–100)
MONOCYTES ABSOLUTE: 1.4 K/CU MM
MONOCYTES RELATIVE PERCENT: 11.5 % (ref 0–4)
MUCUS: ABNORMAL HPF
NITRITE URINE, QUANTITATIVE: NEGATIVE
NUCLEATED RBC %: 0 %
PDW BLD-RTO: 11.5 % (ref 11.7–14.9)
PH, URINE: 5 (ref 5–8)
PLATELET # BLD: 227 K/CU MM (ref 140–440)
PMV BLD AUTO: 9.5 FL (ref 7.5–11.1)
POTASSIUM SERPL-SCNC: 3.6 MMOL/L (ref 3.5–5.1)
PROTEIN UA: NEGATIVE MG/DL
RBC # BLD: 4.27 M/CU MM (ref 4.6–6.2)
RBC URINE: 1 /HPF (ref 0–3)
SEGMENTED NEUTROPHILS ABSOLUTE COUNT: 7.7 K/CU MM
SEGMENTED NEUTROPHILS RELATIVE PERCENT: 66 % (ref 36–66)
SODIUM BLD-SCNC: 138 MMOL/L (ref 135–145)
SPECIFIC GRAVITY UA: 1.03 (ref 1–1.03)
SQUAMOUS EPITHELIAL: <1 /HPF
TOTAL IMMATURE NEUTOROPHIL: 0.04 K/CU MM
TOTAL NUCLEATED RBC: 0 K/CU MM
TOTAL PROTEIN: 7.7 GM/DL (ref 6.4–8.2)
TRICHOMONAS: ABNORMAL /HPF
UROBILINOGEN, URINE: 2 MG/DL (ref 0.2–1)
WBC # BLD: 11.7 K/CU MM (ref 4–10.5)
WBC UA: 1 /HPF (ref 0–2)

## 2021-03-25 PROCEDURE — 71275 CT ANGIOGRAPHY CHEST: CPT

## 2021-03-25 PROCEDURE — 99285 EMERGENCY DEPT VISIT HI MDM: CPT | Performed by: PHYSICIAN ASSISTANT

## 2021-03-25 PROCEDURE — 6360000002 HC RX W HCPCS: Performed by: PHYSICIAN ASSISTANT

## 2021-03-25 PROCEDURE — 2580000003 HC RX 258: Performed by: PHYSICIAN ASSISTANT

## 2021-03-25 PROCEDURE — 81001 URINALYSIS AUTO W/SCOPE: CPT

## 2021-03-25 PROCEDURE — 96374 THER/PROPH/DIAG INJ IV PUSH: CPT | Performed by: PHYSICIAN ASSISTANT

## 2021-03-25 PROCEDURE — 85025 COMPLETE CBC W/AUTO DIFF WBC: CPT

## 2021-03-25 PROCEDURE — 83880 ASSAY OF NATRIURETIC PEPTIDE: CPT

## 2021-03-25 PROCEDURE — 80053 COMPREHEN METABOLIC PANEL: CPT

## 2021-03-25 PROCEDURE — 85610 PROTHROMBIN TIME: CPT

## 2021-03-25 PROCEDURE — 6360000004 HC RX CONTRAST MEDICATION: Performed by: PHYSICIAN ASSISTANT

## 2021-03-25 PROCEDURE — 74177 CT ABD & PELVIS W/CONTRAST: CPT

## 2021-03-25 PROCEDURE — 84484 ASSAY OF TROPONIN QUANT: CPT

## 2021-03-25 PROCEDURE — 96372 THER/PROPH/DIAG INJ SC/IM: CPT | Performed by: PHYSICIAN ASSISTANT

## 2021-03-25 PROCEDURE — 85730 THROMBOPLASTIN TIME PARTIAL: CPT

## 2021-03-25 PROCEDURE — 83690 ASSAY OF LIPASE: CPT

## 2021-03-25 PROCEDURE — 85027 COMPLETE CBC AUTOMATED: CPT

## 2021-03-25 RX ORDER — HEPARIN SODIUM 1000 [USP'U]/ML
80 INJECTION, SOLUTION INTRAVENOUS; SUBCUTANEOUS ONCE
Status: DISCONTINUED | OUTPATIENT
Start: 2021-03-25 | End: 2021-03-25

## 2021-03-25 RX ORDER — DICYCLOMINE HYDROCHLORIDE 10 MG/ML
20 INJECTION INTRAMUSCULAR ONCE
Status: COMPLETED | OUTPATIENT
Start: 2021-03-25 | End: 2021-03-25

## 2021-03-25 RX ORDER — SODIUM CHLORIDE 0.9 % (FLUSH) 0.9 %
10 SYRINGE (ML) INJECTION 2 TIMES DAILY
Status: DISCONTINUED | OUTPATIENT
Start: 2021-03-25 | End: 2021-03-26 | Stop reason: HOSPADM

## 2021-03-25 RX ORDER — HEPARIN SODIUM 1000 [USP'U]/ML
80 INJECTION, SOLUTION INTRAVENOUS; SUBCUTANEOUS PRN
Status: DISCONTINUED | OUTPATIENT
Start: 2021-03-25 | End: 2021-03-25

## 2021-03-25 RX ORDER — HEPARIN SODIUM 10000 [USP'U]/100ML
5-30 INJECTION, SOLUTION INTRAVENOUS CONTINUOUS
Status: DISCONTINUED | OUTPATIENT
Start: 2021-03-25 | End: 2021-03-25

## 2021-03-25 RX ORDER — 0.9 % SODIUM CHLORIDE 0.9 %
1000 INTRAVENOUS SOLUTION INTRAVENOUS ONCE
Status: COMPLETED | OUTPATIENT
Start: 2021-03-25 | End: 2021-03-26

## 2021-03-25 RX ORDER — HEPARIN SODIUM 1000 [USP'U]/ML
40 INJECTION, SOLUTION INTRAVENOUS; SUBCUTANEOUS PRN
Status: DISCONTINUED | OUTPATIENT
Start: 2021-03-25 | End: 2021-03-25

## 2021-03-25 RX ORDER — ONDANSETRON 2 MG/ML
4 INJECTION INTRAMUSCULAR; INTRAVENOUS ONCE
Status: COMPLETED | OUTPATIENT
Start: 2021-03-25 | End: 2021-03-25

## 2021-03-25 RX ADMIN — SODIUM CHLORIDE 1000 ML: 9 INJECTION, SOLUTION INTRAVENOUS at 20:12

## 2021-03-25 RX ADMIN — SODIUM CHLORIDE, PRESERVATIVE FREE 10 ML: 5 INJECTION INTRAVENOUS at 21:53

## 2021-03-25 RX ADMIN — DICYCLOMINE HYDROCHLORIDE 20 MG: 20 INJECTION INTRAMUSCULAR at 20:13

## 2021-03-25 RX ADMIN — IOPAMIDOL 75 ML: 755 INJECTION, SOLUTION INTRAVENOUS at 22:53

## 2021-03-25 RX ADMIN — IOPAMIDOL 80 ML: 755 INJECTION, SOLUTION INTRAVENOUS at 21:52

## 2021-03-25 RX ADMIN — ONDANSETRON 4 MG: 2 INJECTION INTRAMUSCULAR; INTRAVENOUS at 20:13

## 2021-03-25 ASSESSMENT — PAIN DESCRIPTION - PAIN TYPE: TYPE: ACUTE PAIN

## 2021-03-25 ASSESSMENT — PAIN DESCRIPTION - LOCATION: LOCATION: ABDOMEN

## 2021-03-25 NOTE — ED PROVIDER NOTES
eMERGENCY dEPARTMENT eNCOUnter      [address] [facility]    Chief Complaint   Patient presents with    Abdominal Pain       HPI    Carlyn Blunt is a 63 y.o. male who presents with abdominal pain. Patient from group home with caregiver. He was apparently at Day Group earlier today and began complaining of abdominal pain so he was taken back to group home. He continued to complain of pain and then requested to come to the hospital.  Pain is periumbilical.  Denies any vomiting or diarrhea. Denies cough, congestion, cp, sob. Denies urinary symptoms. Denies fever, chills. REVIEW OF SYSTEMS    See HPI for further details. Review of systems otherwise negative. Constitutional:  Denies fever. HENT:  Denies headache. Respiratory:  Denies any shortness of breath. Cardiovascular:  Denies chest pain. GI:  + abdominal pain, denies nausea, denies vomiting, denies diarrhea, denies constipation. :  Denies urinary symptoms. Musculoskeletal:  Denies any extremity pain. Back:  Denies back pain. Integument:  Denies any skin changes. Lymphatic:  Denies lymphadenopathy. PAST MEDICAL HISTORY    Past Medical History:   Diagnosis Date    Brain injury ([facility] [address]     GERD (gastroesophageal reflux disease)     Hyperlipidemia     Hypertension     Seizures ([facility] [address]        SURGICAL HISTORY    No past surgical history on file. CURRENT MEDICATIONS    Current Outpatient Rx   Medication Sig Dispense Refill    rivaroxaban 15 & 20 MG Starter Pack Take as directed on package. 1 Package 0    nystatin (MYCOSTATIN) 919795 UNIT/GM cream Apply topically 2 times daily Apply topically 2 times daily.       risperiDONE (RISPERDAL) 1 MG tablet Take 1 mg by mouth nightly      phenytoin (DILANTIN) 100 MG ER capsule Take 400 mg by mouth every morning      fenofibrate (TRIGLIDE) 160 MG tablet Take 160 mg by mouth daily      atorvastatin (LIPITOR) 10 MG tablet Take 10 mg by mouth nightly      omeprazole (PRILOSEC) 20 MG delayed release capsule Take 20 mg by mouth daily      fluvoxaMINE (LUVOX) 100 MG tablet Take 200 mg by mouth nightly      Multiple Vitamins-Minerals (THERAPEUTIC MULTIVITAMIN-MINERALS) tablet Take 1 tablet by mouth daily      busPIRone (BUSPAR) 30 MG tablet Take 30 mg by mouth 2 times daily       lamoTRIgine (LAMICTAL) 100 MG tablet Take 100 mg by mouth 2 times daily       fluvoxamine (LUVOX) 100 MG tablet Take 100 mg by mouth every morning 1 tab in AM & 2 tabs @@ HS       clonazePAM (KLONOPIN) 0.5 MG tablet Take 0.5 mg by mouth three times daily. ALLERGIES    Allergies   Allergen Reactions    Amoxicillin Other (See Comments)     unknown    Bactrim [Sulfamethoxazole-Trimethoprim]        FAMILY HISTORY    No family history on file.     SOCIAL HISTORY    Social History     Socioeconomic History    Marital status: Single     Spouse name: Not on file    Number of children: Not on file    Years of education: Not on file    Highest education level: Not on file   Occupational History    Not on file   Social Needs    Financial resource strain: Not on file    Food insecurity     Worry: Not on file     Inability: Not on file    Transportation needs     Medical: Not on file     Non-medical: Not on file   Tobacco Use    Smoking status: Current Every Day Smoker     Packs/day: 0.50    Smokeless tobacco: Never Used   Substance and Sexual Activity    Alcohol use: No    Drug use: No    Sexual activity: Not on file   Lifestyle    Physical activity     Days per week: Not on file     Minutes per session: Not on file    Stress: Not on file   Relationships    Social connections     Talks on phone: Not on file     Gets together: Not on file     Attends Mandaeism service: Not on file     Active member of club or organization: Not on file     Attends meetings of clubs or organizations: Not on file     Relationship status: Not on file    Intimate partner violence     Fear of current or ex partner: Not on file Emotionally abused: Not on file     Physically abused: Not on file     Forced sexual activity: Not on file   Other Topics Concern    Not on file   Social History Narrative    Not on file       PHYSICAL EXAM    VITAL SIGNS: /70   Pulse 91   Temp 98.7 °F (37.1 °C) (Oral)   Resp 18   Ht 6' (1.829 m)   Wt 210 lb (95.3 kg)   SpO2 96%   BMI 28.48 kg/m²   Constitutional:  Well developed, well nourished, no acute distress, non-toxic appearance   Eyes:  Sclera anicteric. HENT:  NC/AT. Ears, nose normal.  Oropharynx moist.  Neck:  Supple. Respiratory:  Lungs CTAB. Cardiovascular:  RRR. GI:  Abdomen soft, non-distended, no tenderness to palpation on my exam.  BS active. :  No CVA tenderness. Musculoskeletal:  No acute deformities. Integument:  Warm and dry. Neurologic:  Alert & oriented. No focal deficits. LABS/IMAGING    Labs Reviewed   COMPREHENSIVE METABOLIC PANEL - Abnormal; Notable for the following components:       Result Value    Glucose 108 (*)     All other components within normal limits   CBC WITH AUTO DIFFERENTIAL - Abnormal; Notable for the following components:    WBC 11.7 (*)     RBC 4.27 (*)     Hematocrit 41.7 (*)     MCH 33.7 (*)     RDW 11.5 (*)     Lymphocytes % 21.2 (*)     Monocytes % 11.5 (*)     All other components within normal limits   URINALYSIS - Abnormal; Notable for the following components:    Color, UA CITLALI (*)     Urobilinogen, Urine 2.0 (*)     Mucus, UA FEW (*)     All other components within normal limits   LIPASE   BRAIN NATRIURETIC PEPTIDE   TROPONIN   APTT   APTT   PROTIME-INR   APTT       Ct Head Wo Contrast    Result Date: 3/15/2021  EXAMINATION: CT OF THE HEAD WITHOUT CONTRAST  3/15/2021 10:18 am TECHNIQUE: CT of the head was performed without the administration of intravenous contrast. Dose modulation, iterative reconstruction, and/or weight based adjustment of the mA/kV was utilized to reduce the radiation dose to as low as reasonably achievable. COMPARISON: 04/25/2019 HISTORY: Seizure. FINDINGS: BRAIN/VENTRICLES: No acute intracranial hemorrhage, mass effect or midline shift. No abnormal extra-axial fluid collection. The gray-white differentiation is maintained without evidence of an acute infarct. Stable encephalomalacia in the frontal lobes, right greater than left, and in the right temporal lobe with ex vacuo dilatation of the frontal horns. No hydrocephalus. ORBITS: The visualized portion of the orbits demonstrate no acute abnormality. SINUSES: The visualized paranasal sinuses and mastoid air cells demonstrate no acute abnormality. SOFT TISSUES/SKULL:  No acute abnormality of the visualized skull or soft tissues. Stable craniotomy changes. No acute intracranial abnormality. Otherwise stable CT. Xr Chest Portable    Result Date: 3/15/2021  EXAMINATION: ONE XRAY VIEW OF THE CHEST 3/15/2021 10:32 am COMPARISON: 01/13/2016 HISTORY: ORDERING SYSTEM PROVIDED HISTORY: hypoxemia, ?aspiration after sz TECHNOLOGIST PROVIDED HISTORY: Reason for exam:->hypoxemia, ?aspiration after sz Reason for Exam: hypoxemia     seizures(aspiration?) Acuity: Unknown Type of Exam: Subsequent/Follow-up FINDINGS: Monitor wires project over the chest.  Rotated chest.  Heart size and mediastinal contours are within normal worse. Hazy bilateral lung opacities. No focal consolidation. No pleural effusion or pneumothorax. 1. Hazy bilateral lung opacities may represent atypical/viral pneumonia or pulmonary edema. ED COURSE & MEDICAL DECISION MAKING    Pertinent Labs & Imaging studies reviewed. (See chart for details)   -  Patient seen and evaluated in the emergency department. -  Triage and nursing notes reviewed and incorporated. -  Old chart records reviewed and incorporated. -  Supervising physician was Dr. Kodi Dwyer. Patient was seen independently.   -  Differential diagnosis includes:  appendicitis, gastritis, bowel obstruction, cholecystitis/cholelithiasis, diverticulitis, incarcerated hernia, pancreatitis, performed bowel, uti, and others   -  Work-up included:  see above  -  ED treatment included:  NS, Zofran, Bentyl, Xarelto  -  Results discussed with patient and caregiver. CT abd pelv was concerning for RLL PE. Several incidental findings as noted but nothing acute in abd/pelv. CTA chest was obtained which does show bilateral LL PEs, no evidence of heart strain. Troponin and BNP are not elevated. Remaining labs unremarkable. I spoke with patient's PCP, Dr. Maritza Aiken, who recommends Xarelto and close FU in his office. Caregiver and patient are agreeable with plan of care. Given initial dose here in the ED and prescription for starter pack. FU with PCP, return here for new/worsening symptoms in the interim. -  DC home. In light of current events, I did utilize appropriate PPE (including N95 and surgical face mask, safety glasses, and gloves, as recommended by the health facility/national standard best practice, during my bedside interactions with the patient. FINAL IMPRESSION    1.  Acute pulmonary embolism, unspecified pulmonary embolism type, unspecified whether acute cor pulmonale present [facility])              Shannon Gao PA-C  03/26/21 0232

## 2021-03-25 NOTE — ED NOTES
Attempted 2 venipuncture procedures on patient, both failed: 1st in left hand, 2nd in right.       Ksenia Gupta  03/25/21 [id number]

## 2021-03-26 VITALS
OXYGEN SATURATION: 96 % | HEART RATE: 91 BPM | TEMPERATURE: 98.7 F | HEIGHT: 72 IN | DIASTOLIC BLOOD PRESSURE: 70 MMHG | SYSTOLIC BLOOD PRESSURE: 112 MMHG | BODY MASS INDEX: 28.44 KG/M2 | RESPIRATION RATE: 18 BRPM | WEIGHT: 210 LBS

## 2021-03-26 LAB
APTT: 26.5 SECONDS (ref 25.1–37.1)
INR BLD: 1.07 INDEX
PRO-BNP: 89.65 PG/ML
PROTHROMBIN TIME: 12.9 SECONDS (ref 11.7–14.5)
TROPONIN T: <0.01 NG/ML

## 2021-03-26 PROCEDURE — 6370000000 HC RX 637 (ALT 250 FOR IP): Performed by: PHYSICIAN ASSISTANT

## 2021-03-26 PROCEDURE — 85730 THROMBOPLASTIN TIME PARTIAL: CPT

## 2021-03-26 RX ADMIN — RIVAROXABAN 15 MG: 15 TABLET, FILM COATED ORAL at 00:44

## 2021-03-26 NOTE — ED NOTES
CT ABDOMEN PELVIS W IV CONTRAST Additional Contrast? None  Status: Preliminary result   [facility] Providers    [facility]          Reading Providers    Read Date Phone Pager   Ryan Ta Mar 25, 2021 10:09 [phone]    Radiation Dose Estimates    No radiation information found for this patient   Narrative   EXAMINATION:   CT OF THE ABDOMEN AND PELVIS WITH CONTRAST 3/25/2021 9:52 pm       TECHNIQUE:   CT of the abdomen and pelvis was performed with the administration of   intravenous contrast. Multiplanar reformatted images are provided for review. Dose modulation, iterative reconstruction, and/or weight based adjustment of   the mA/kV was utilized to reduce the radiation dose to as low as reasonably   achievable.       COMPARISON:   None.       HISTORY:   ORDERING SYSTEM PROVIDED HISTORY: abd pain   TECHNOLOGIST PROVIDED HISTORY:   Reason for exam:->abd pain   Additional Contrast?->None   Decision Support Exception->Emergency Medical Condition (MA)   Reason for Exam: diffuse abd. pain. Acuity: Acute   Type of Exam: Initial   Additional signs and symptoms: no a/p surg. per chart. Relevant Medical/Surgical History: 80 ml isovue 370 used,       FINDINGS:   Lower Chest: There is bibasilar platelike atelectasis.  Trace bilateral   pleural effusions.  There appear to be intraluminal filling defects within   the right lower lobe pulmonary arteries, suboptimally evaluated.  The heart   is mildly enlarged.  No interventricular septal bowing.  No evidence to   suggest right heart strain.  The main pulmonary artery is normal in caliber.       Organs: The liver, spleen, pancreas, kidneys and adrenal glands are without   acute findings.  There is cholelithiasis without evidence of acute   cholecystitis.  Small bilateral renal cysts.       GI/Bowel: No mechanical bowel obstruction.  Normal appendix.       Pelvis: The urinary bladder is partially distended without contour   abnormality.  The prostate is mildly heterogeneous with slight asymmetric   prominence on the left.  Small left fat containing inguinal hernia.       Peritoneum/Retroperitoneum: Mild atherosclerotic plaque.  No lymphadenopathy.       Bones/Soft Tissues: No acute or aggressive osseous lesions.           Impression   1. Suspected right lower lobe pulmonary emboli. 2. Mild cardiomegaly without evidence of right heart strain. 3. Bibasilar atelectasis and trace bilateral pleural effusions. 4. No acute infectious or inflammatory process in the abdomen or pelvis. 5. Cholelithiasis.    6. Mild heterogeneity of the prostate with slight asymmetric prominence on   the left.  Suggest correlation with PSA levels on a nonemergent basis.              Ana Barclay RN  03/25/21 [id number]

## 2021-03-26 NOTE — ED NOTES
Pt ambulating to and from restroom with caregiver at this time.      Kristina Qureshi RN  03/26/21 3534

## 2021-04-05 ENCOUNTER — HOSPITAL ENCOUNTER (EMERGENCY)
Age: 56
Discharge: HOME | End: 2021-04-05
Payer: MEDICARE

## 2021-04-05 ENCOUNTER — APPOINTMENT (OUTPATIENT)
Dept: GENERAL RADIOLOGY | Age: 56
End: 2021-04-05
Payer: MEDICARE

## 2021-04-05 VITALS
OXYGEN SATURATION: 96 % | TEMPERATURE: 98 F | RESPIRATION RATE: 16 BRPM | HEART RATE: 86 BPM | SYSTOLIC BLOOD PRESSURE: 116 MMHG | DIASTOLIC BLOOD PRESSURE: 76 MMHG

## 2021-04-05 DIAGNOSIS — M79.602 LEFT ARM PAIN: Primary | ICD-10-CM

## 2021-04-05 PROCEDURE — 73090 X-RAY EXAM OF FOREARM: CPT

## 2021-04-05 PROCEDURE — 99283 EMERGENCY DEPT VISIT LOW MDM: CPT

## 2021-04-05 NOTE — ED TRIAGE NOTES
Ems reports that pt c/o left arm pain. Pt denies SOB and CP. Pt came from [facility] with [facility] a [facility] for [facility].

## 2021-04-05 NOTE — ED PROVIDER NOTES
Patient Identification  Mark Nyhan is a 63 y.o. male    Chief Complaint  Arm Pain (left- with movement )      HPI  (History provided by patient)  This is a 63 y.o. male who was brought in by EMS for chief complaint of left arm pain. 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 She states that she was told that he was having pain in his left side earlier, is a history of PE and is currently on Xarelto which is why he was brought here. Patient denies any pain in his chest, flank, back. Only reports pain in his left forearm. This is completely unlike when he was diagnosed with pulmonary emboli, that pain was constant and patient appeared in distress for supervisor. REVIEW OF SYSTEMS    Constitutional:  Denies fever, chills  Musculoskeletal:  Denies back pain. + arm pain  Skin:  Denies rash  Neurologic:  Denies focal weakness, or sensory changes     See HPI and nursing notes for additional information     I have reviewed the following nursing documentation:  Allergies: Allergies   Allergen Reactions    Amoxicillin Other (See Comments)     unknown    Bactrim [Sulfamethoxazole-Trimethoprim]        Past medical history:  has a past medical history of Brain injury ([facility] [address] GERD (gastroesophageal reflux disease), Hyperlipidemia, Hypertension, and Seizures ([facility] [address] Past surgical history:  has no past surgical history on file. Home medications:   Prior to Admission medications    Medication Sig Start Date End Date Taking? Authorizing Provider   rivaroxaban 15 & 20 MG Starter Pack Take as directed on package. 3/26/21   Mable Pardo PA-C   nystatin (MYCOSTATIN) 138055 UNIT/GM cream Apply topically 2 times daily Apply topically 2 times daily.     Historical Provider, MD   risperiDONE (RISPERDAL) 1 MG tablet Take 1 mg by mouth nightly Historical Provider, MD   phenytoin (DILANTIN) 100 MG ER capsule Take 400 mg by mouth every morning    Historical Provider, MD   fenofibrate (TRIGLIDE) 160 MG tablet Take 160 mg by mouth daily    Historical Provider, MD   atorvastatin (LIPITOR) 10 MG tablet Take 10 mg by mouth nightly    Historical Provider, MD   omeprazole (PRILOSEC) 20 MG delayed release capsule Take 20 mg by mouth daily    Historical Provider, MD   fluvoxaMINE (LUVOX) 100 MG tablet Take 200 mg by mouth nightly    Historical Provider, MD   Multiple Vitamins-Minerals (THERAPEUTIC MULTIVITAMIN-MINERALS) tablet Take 1 tablet by mouth daily    Historical Provider, MD   busPIRone (BUSPAR) 30 MG tablet Take 30 mg by mouth 2 times daily     Historical Provider, MD   lamoTRIgine (LAMICTAL) 100 MG tablet Take 100 mg by mouth 2 times daily     Historical Provider, MD   fluvoxamine (LUVOX) 100 MG tablet Take 100 mg by mouth every morning 1 tab in AM & 2 tabs @@ HS     Historical Provider, MD   clonazePAM (KLONOPIN) 0.5 MG tablet Take 0.5 mg by mouth three times daily. Historical Provider, MD       Social history:  reports that he has been smoking. He has been smoking about 0.50 packs per day. He has never used smokeless tobacco. He reports that he does not drink alcohol or use drugs. Family history:  No family history on file. Exam  /77   Pulse 84   Temp 98 °F (36.7 °C) (Oral)   Resp 16   SpO2 95%   Nursing note and vitals reviewed. Constitutional: Well developed, well nourished. No acute distress. HENT:      Head: Normocephalic and atraumatic. Ears: External ears normal.      Nose: Nose normal.  Cardiovascular: Radial pulses 2+ bilaterally. RRR  Pulmonary/Chest: Effort normal. No respiratory distress. CTAB  GI: Soft nontender throughout. Musculoskeletal: Moves all extremities. No gross deformity. There is no swelling or bruising noted with inspection of the left arm.   There is no tenderness to palpation including over the temperature 98 °F (36.7 °C), temperature source Oral, resp. rate 16, SpO2 95 %. Disposition:  Discharge to home in stable condition. Patient was given scripts for the following medications. I counseled patient how to take these medications. New Prescriptions    No medications on file       [unfilled]    This chart was generated using the [address] [place] [address] dictation system. I created this record but it may contain dictation errors given the limitations of this technology.        Alicia Lira, PA-C  04/05/21 0546

## 2021-05-17 ENCOUNTER — HOSPITAL ENCOUNTER (OUTPATIENT)
Age: 56
Setting detail: SPECIMEN
Discharge: HOME | End: 2021-05-17
Payer: MEDICARE

## 2021-05-17 PROCEDURE — 84403 ASSAY OF TOTAL TESTOSTERONE: CPT

## 2021-05-17 PROCEDURE — 36415 COLL VENOUS BLD VENIPUNCTURE: CPT

## 2021-05-17 PROCEDURE — 84153 ASSAY OF PSA TOTAL: CPT

## 2021-05-17 PROCEDURE — 84154 ASSAY OF PSA FREE: CPT

## 2021-05-18 LAB
PROSTATE SPECIFIC ANTIGEN FREE: 0.2 NG/ML
PROSTATE SPECIFIC ANTIGEN PERCENT FREE: 17 %
PROSTATE SPECIFIC ANTIGEN: 1.2 NG/ML (ref 0–4)
TESTOSTERONE TOTAL-MALE: 703 NG/DL (ref 300–890)

## 2021-06-04 ENCOUNTER — HOSPITAL ENCOUNTER (EMERGENCY)
Age: 56
Discharge: HOME | End: 2021-06-05
Attending: EMERGENCY MEDICINE
Payer: MEDICARE

## 2021-06-04 ENCOUNTER — APPOINTMENT (OUTPATIENT)
Dept: CT IMAGING | Age: 56
End: 2021-06-04
Payer: MEDICARE

## 2021-06-04 DIAGNOSIS — R56.9 SEIZURES (HCC): Primary | ICD-10-CM

## 2021-06-04 DIAGNOSIS — R56.9 SEIZURE SECONDARY TO SUBTHERAPEUTIC ANTICONVULSANT MEDICATION (HCC): ICD-10-CM

## 2021-06-04 DIAGNOSIS — Z79.899 SEIZURE SECONDARY TO SUBTHERAPEUTIC ANTICONVULSANT MEDICATION (HCC): ICD-10-CM

## 2021-06-04 LAB
ANION GAP SERPL CALCULATED.3IONS-SCNC: 14 MMOL/L (ref 4–16)
BASOPHILS ABSOLUTE: 0.1 K/CU MM
BASOPHILS RELATIVE PERCENT: 0.6 % (ref 0–1)
BUN BLDV-MCNC: 15 MG/DL (ref 6–23)
CALCIUM SERPL-MCNC: 8.5 MG/DL (ref 8.3–10.6)
CHLORIDE BLD-SCNC: 102 MMOL/L (ref 99–110)
CO2: 20 MMOL/L (ref 21–32)
CREAT SERPL-MCNC: 1.1 MG/DL (ref 0.9–1.3)
DIFFERENTIAL TYPE: ABNORMAL
EOSINOPHILS ABSOLUTE: 0.1 K/CU MM
EOSINOPHILS RELATIVE PERCENT: 0.8 % (ref 0–3)
GFR AFRICAN AMERICAN: >60 ML/MIN/1.73M2
GFR NON-AFRICAN AMERICAN: >60 ML/MIN/1.73M2
GLUCOSE BLD-MCNC: 135 MG/DL (ref 70–99)
HCT VFR BLD CALC: 39.5 % (ref 42–52)
HEMOGLOBIN: 14 GM/DL (ref 13.5–18)
IMMATURE NEUTROPHIL %: 0.4 % (ref 0–0.43)
LACTATE DEHYDROGENASE: 261 IU/L (ref 120–246)
LYMPHOCYTES ABSOLUTE: 2.1 K/CU MM
LYMPHOCYTES RELATIVE PERCENT: 22.4 % (ref 24–44)
MCH RBC QN AUTO: 33.7 PG (ref 27–31)
MCHC RBC AUTO-ENTMCNC: 35.4 % (ref 32–36)
MCV RBC AUTO: 95 FL (ref 78–100)
MONOCYTES ABSOLUTE: 0.8 K/CU MM
MONOCYTES RELATIVE PERCENT: 8.3 % (ref 0–4)
NUCLEATED RBC %: 0 %
PDW BLD-RTO: 11.9 % (ref 11.7–14.9)
PLATELET # BLD: 192 K/CU MM (ref 140–440)
PMV BLD AUTO: 9 FL (ref 7.5–11.1)
POTASSIUM SERPL-SCNC: 3.9 MMOL/L (ref 3.5–5.1)
RBC # BLD: 4.16 M/CU MM (ref 4.6–6.2)
SEGMENTED NEUTROPHILS ABSOLUTE COUNT: 6.4 K/CU MM
SEGMENTED NEUTROPHILS RELATIVE PERCENT: 67.5 % (ref 36–66)
SODIUM BLD-SCNC: 136 MMOL/L (ref 135–145)
TOTAL IMMATURE NEUTOROPHIL: 0.04 K/CU MM
TOTAL NUCLEATED RBC: 0 K/CU MM
WBC # BLD: 9.4 K/CU MM (ref 4–10.5)

## 2021-06-04 PROCEDURE — 96361 HYDRATE IV INFUSION ADD-ON: CPT

## 2021-06-04 PROCEDURE — 96365 THER/PROPH/DIAG IV INF INIT: CPT

## 2021-06-04 PROCEDURE — 83615 LACTATE (LD) (LDH) ENZYME: CPT

## 2021-06-04 PROCEDURE — 70450 CT HEAD/BRAIN W/O DYE: CPT

## 2021-06-04 PROCEDURE — 80175 DRUG SCREEN QUAN LAMOTRIGINE: CPT

## 2021-06-04 PROCEDURE — 85025 COMPLETE CBC W/AUTO DIFF WBC: CPT

## 2021-06-04 PROCEDURE — 80185 ASSAY OF PHENYTOIN TOTAL: CPT

## 2021-06-04 PROCEDURE — 80048 BASIC METABOLIC PNL TOTAL CA: CPT

## 2021-06-04 PROCEDURE — 99284 EMERGENCY DEPT VISIT MOD MDM: CPT

## 2021-06-04 RX ORDER — 0.9 % SODIUM CHLORIDE 0.9 %
1000 INTRAVENOUS SOLUTION INTRAVENOUS ONCE
Status: COMPLETED | OUTPATIENT
Start: 2021-06-04 | End: 2021-06-05

## 2021-06-05 VITALS
DIASTOLIC BLOOD PRESSURE: 78 MMHG | OXYGEN SATURATION: 96 % | TEMPERATURE: 99.1 F | RESPIRATION RATE: 18 BRPM | WEIGHT: 212 LBS | SYSTOLIC BLOOD PRESSURE: 119 MMHG | HEIGHT: 72 IN | HEART RATE: 86 BPM | BODY MASS INDEX: 28.71 KG/M2

## 2021-06-05 LAB
AMORPHOUS: ABNORMAL /HPF
BACTERIA: NEGATIVE /HPF
BILIRUBIN URINE: NEGATIVE MG/DL
BLOOD, URINE: NEGATIVE
CLARITY: ABNORMAL
COLOR: YELLOW
DOSE AMOUNT: NORMAL
DOSE TIME: NORMAL
GLUCOSE, URINE: NEGATIVE MG/DL
HYALINE CASTS: 0 /LPF
KETONES, URINE: NEGATIVE MG/DL
LEUKOCYTE ESTERASE, URINE: NEGATIVE
NITRITE URINE, QUANTITATIVE: NEGATIVE
PH, URINE: 7 (ref 5–8)
PHENYTOIN LEVEL: 17.4 UG/ML (ref 10–20)
PROTEIN UA: NEGATIVE MG/DL
RBC URINE: 3 /HPF (ref 0–3)
SPECIFIC GRAVITY UA: 1.02 (ref 1–1.03)
SPERM: ABNORMAL /HFP
TRICHOMONAS: ABNORMAL /HPF
UROBILINOGEN, URINE: NEGATIVE MG/DL (ref 0.2–1)
WBC UA: ABNORMAL /HPF (ref 0–2)

## 2021-06-05 PROCEDURE — 6370000000 HC RX 637 (ALT 250 FOR IP): Performed by: EMERGENCY MEDICINE

## 2021-06-05 PROCEDURE — 87086 URINE CULTURE/COLONY COUNT: CPT

## 2021-06-05 PROCEDURE — 96365 THER/PROPH/DIAG IV INF INIT: CPT

## 2021-06-05 PROCEDURE — 2580000003 HC RX 258: Performed by: EMERGENCY MEDICINE

## 2021-06-05 PROCEDURE — 81001 URINALYSIS AUTO W/SCOPE: CPT

## 2021-06-05 PROCEDURE — 6360000002 HC RX W HCPCS: Performed by: EMERGENCY MEDICINE

## 2021-06-05 RX ORDER — LAMOTRIGINE 25 MG/1
50 TABLET ORAL ONCE
Status: COMPLETED | OUTPATIENT
Start: 2021-06-05 | End: 2021-06-05

## 2021-06-05 RX ORDER — PHENYTOIN SODIUM 100 MG/1
100 CAPSULE, EXTENDED RELEASE ORAL ONCE
Status: COMPLETED | OUTPATIENT
Start: 2021-06-05 | End: 2021-06-05

## 2021-06-05 RX ADMIN — LEVETIRACETAM 1000 MG: 100 INJECTION, SOLUTION INTRAVENOUS at 01:07

## 2021-06-05 RX ADMIN — PHENYTOIN SODIUM 100 MG: 100 CAPSULE, EXTENDED RELEASE ORAL at 04:10

## 2021-06-05 RX ADMIN — LAMOTRIGINE 50 MG: 25 TABLET ORAL at 04:10

## 2021-06-05 RX ADMIN — SODIUM CHLORIDE 1000 ML: 9 INJECTION, SOLUTION INTRAVENOUS at 01:06

## 2021-06-05 ASSESSMENT — ENCOUNTER SYMPTOMS
SINUS PAIN: 0
BACK PAIN: 0
EYES NEGATIVE: 1
ABDOMINAL PAIN: 0
CHEST TIGHTNESS: 0
SORE THROAT: 0
WHEEZING: 0
EYE DISCHARGE: 0
EYE PAIN: 0
EYE REDNESS: 0
RESPIRATORY NEGATIVE: 1
SINUS PRESSURE: 0
COUGH: 0
GASTROINTESTINAL NEGATIVE: 1
VOMITING: 0
FACIAL SWELLING: 0
NAUSEA: 0
SHORTNESS OF BREATH: 0
RHINORRHEA: 0

## 2021-06-06 LAB
CULTURE: NORMAL
LAMOTRIGINE LEVEL: 1 UG/ML (ref 2.5–15)
Lab: NORMAL
SPECIMEN: NORMAL

## 2021-06-06 NOTE — ED PROVIDER NOTES
dizziness, speech difficulty, light-headedness, numbness and headaches. Psychiatric/Behavioral: Negative. Negative for agitation, confusion and self-injury. The patient is not nervous/anxious. All other systems reviewed and are negative. Except as noted above the remainder of the review of systems was reviewed and negative. PAST MEDICAL HISTORY     Past Medical History:   Diagnosis Date    Brain injury ([facility] [address]     GERD (gastroesophageal reflux disease)     Hyperlipidemia     Hypertension     Seizures ([facility]        Prior to Admission medications    Medication Sig Start Date End Date Taking? Authorizing Provider   finasteride (PROSCAR) 5 MG tablet TAKE 1 TABLET BY MOUTH DAILY 4/28/21   Historical Provider, MD   atorvastatin (LIPITOR) 10 MG tablet Take 10 mg by mouth daily    Historical Provider, MD   fenofibrate (TRICOR) 48 MG tablet Take 48 mg by mouth daily    Historical Provider, MD   XARELTO 20 MG TABS tablet TAKE 1 TABLET BY MOUTH DAILY 4/21/21   Historical Provider, MD   nystatin (MYCOSTATIN) 576451 UNIT/GM cream Apply topically 2 times daily Apply topically 2 times daily.     Historical Provider, MD   risperiDONE (RISPERDAL) 1 MG tablet Take 1 mg by mouth nightly    Historical Provider, MD   phenytoin (DILANTIN) 100 MG ER capsule Take 400 mg by mouth every morning    Historical Provider, MD   omeprazole (PRILOSEC) 20 MG delayed release capsule Take 20 mg by mouth daily    Historical Provider, MD   fluvoxaMINE (LUVOX) 100 MG tablet Take 200 mg by mouth nightly    Historical Provider, MD   Multiple Vitamins-Minerals (THERAPEUTIC MULTIVITAMIN-MINERALS) tablet Take 1 tablet by mouth daily    Historical Provider, MD   busPIRone (BUSPAR) 30 MG tablet Take 30 mg by mouth 2 times daily     Historical Provider, MD   lamoTRIgine (LAMICTAL) 100 MG tablet Take 100 mg by mouth 2 times daily     Historical Provider, MD   fluvoxamine (LUVOX) 100 MG tablet Take 100 mg by mouth every morning 1 tab in AM & 2 tabs @@ HS     Historical Provider, MD   clonazePAM (KLONOPIN) 0.5 MG tablet Take 0.5 mg by mouth three times daily. Historical Provider, MD        Patient Active Problem List   Diagnosis    Acute deep vein thrombosis (DVT) of popliteal vein of both lower extremities (HCC)    DVT, lower extremity, proximal, acute, bilateral ([facility] [address]         SURGICAL HISTORY     History reviewed. No pertinent surgical history. CURRENT MEDICATIONS       Discharge Medication List as of 6/5/2021  3:59 AM      CONTINUE these medications which have NOT CHANGED    Details   finasteride (PROSCAR) 5 MG tablet TAKE 1 TABLET BY MOUTH DAILYHistorical Med      atorvastatin (LIPITOR) 10 MG tablet Take 10 mg by mouth dailyHistorical Med      fenofibrate (TRICOR) 48 MG tablet Take 48 mg by mouth dailyHistorical Med      XARELTO 20 MG TABS tablet TAKE 1 TABLET BY MOUTH DAILY, DAWHistorical Med      nystatin (MYCOSTATIN) 767710 UNIT/GM cream Apply topically 2 times daily Apply topically 2 times daily. , Topical, 2 TIMES DAILY, Historical Med      risperiDONE (RISPERDAL) 1 MG tablet Take 1 mg by mouth nightlyHistorical Med      phenytoin (DILANTIN) 100 MG ER capsule Take 400 mg by mouth every morningHistorical Med      omeprazole (PRILOSEC) 20 MG delayed release capsule Take 20 mg by mouth dailyHistorical Med      !! fluvoxaMINE (LUVOX) 100 MG tablet Take 200 mg by mouth nightlyHistorical Med      Multiple Vitamins-Minerals (THERAPEUTIC MULTIVITAMIN-MINERALS) tablet Take 1 tablet by mouth daily      busPIRone (BUSPAR) 30 MG tablet Take 30 mg by mouth 2 times daily Historical Med      lamoTRIgine (LAMICTAL) 100 MG tablet Take 100 mg by mouth 2 times daily Historical Med      !! fluvoxamine (LUVOX) 100 MG tablet Take 100 mg by mouth every morning 1 tab in AM & 2 tabs @@ HS Historical Med      clonazePAM (KLONOPIN) 0.5 MG tablet Take 0.5 mg by mouth three times daily. Historical Med       !! - Potential duplicate medications found.  Please discuss with provider. ALLERGIES     Amoxicillin and Bactrim [sulfamethoxazole-trimethoprim]    FAMILY HISTORY     History reviewed. No pertinent family history. SOCIAL HISTORY       Social History     Socioeconomic History    Marital status: Single     Spouse name: None    Number of children: None    Years of education: None    Highest education level: None   Occupational History    None   Tobacco Use    Smoking status: Current Every Day Smoker     Packs/day: 0.50    Smokeless tobacco: Never Used   Vaping Use    Vaping Use: Never used   Substance and Sexual Activity    Alcohol use: No    Drug use: No    Sexual activity: None   Other Topics Concern    None   Social History Narrative    None     Social Determinants of Health     Financial Resource Strain:     Difficulty of Paying Living Expenses:    Food Insecurity:     Worried About Running Out of Food in the Last Year:     Ran Out of Food in the Last Year:    Transportation Needs:     Lack of Transportation (Medical):      Lack of Transportation (Non-Medical):    Physical Activity:     Days of Exercise per Week:     Minutes of Exercise per Session:    Stress:     Feeling of Stress :    Social Connections:     Frequency of Communication with Friends and Family:     Frequency of Social Gatherings with Friends and Family:     Attends Taoism Services:     Active Member of Clubs or Organizations:     Attends Club or Organization Meetings:     Marital Status:    Intimate Partner Violence:     Fear of Current or Ex-Partner:     Emotionally Abused:     Physically Abused:     Sexually Abused:        SCREENINGS    [place] Coma Scale  Eye Opening: Spontaneous  Best Verbal Response: Oriented  Best Motor Response: Obeys commands  [place] Coma Scale Score: 15          PHYSICAL EXAM    (up to 7 for level 4, 8 or more for level 5)     ED Triage Vitals   BP Temp Temp src Pulse Resp SpO2 Height Weight   06/04/21 2254 06/04/21 2254 -- 06/04/21 [phone] 06/04/21 2254 06/04/21 2254 06/04/21 2251 06/04/21 2251   118/85 99.1 °F (37.3 °C)  107 18 93 % 6' (1.829 m) 212 lb (96.2 kg)       Physical Exam  Vitals and nursing note reviewed. Constitutional:       General: He is not in acute distress. Appearance: He is well-developed. He is not diaphoretic. HENT:      Head: Normocephalic and atraumatic. Nose: Nose normal.      Mouth/Throat:      Pharynx: No oropharyngeal exudate. Eyes:      General:         Right eye: No discharge. Left eye: No discharge. Conjunctiva/sclera: Conjunctivae normal.      Pupils: Pupils are equal, round, and reactive to light. Neck:      Vascular: No JVD. Trachea: No tracheal deviation. Cardiovascular:      Rate and Rhythm: Normal rate and regular rhythm. Heart sounds: Normal heart sounds. No murmur heard. No friction rub. No gallop. Pulmonary:      Effort: Pulmonary effort is normal. No respiratory distress. Breath sounds: Normal breath sounds. No stridor. No wheezing or rales. Chest:      Chest wall: No tenderness. Abdominal:      General: Bowel sounds are normal. There is no distension. Palpations: Abdomen is soft. There is no mass. Tenderness: There is no abdominal tenderness. There is no guarding or rebound. Musculoskeletal:         General: No tenderness or deformity. Normal range of motion. Cervical back: Normal range of motion. Lymphadenopathy:      Cervical: No cervical adenopathy. Skin:     General: Skin is warm and dry. Capillary Refill: Capillary refill takes less than 2 seconds. Coloration: Skin is not pale. Findings: No erythema or rash. Neurological:      Mental Status: He is alert and oriented to person, place, and time. Cranial Nerves: No cranial nerve deficit. Motor: No abnormal muscle tone.       Coordination: Coordination normal.   Psychiatric:         Mood and Affect: Mood normal.         Behavior: Behavior normal. Thought Content: Thought content normal.         Judgment: Judgment normal.         DIAGNOSTIC RESULTS     Labs Reviewed   CBC WITH AUTO DIFFERENTIAL - Abnormal; Notable for the following components:       Result Value    RBC 4.16 (*)     Hematocrit 39.5 (*)     MCH 33.7 (*)     Segs Relative 67.5 (*)     Lymphocytes % 22.4 (*)     Monocytes % 8.3 (*)     All other components within normal limits   BASIC METABOLIC PANEL W/ REFLEX TO MG FOR LOW K - Abnormal; Notable for the following components:    CO2 20 (*)     Glucose 135 (*)     All other components within normal limits   URINALYSIS - Abnormal; Notable for the following components:    Clarity, UA SLIGHTLY CLOUDY (*)     All other components within normal limits   LACTATE DEHYDROGENASE - Abnormal; Notable for the following components:     (*)     All other components within normal limits   CULTURE, URINE   PHENYTOIN LEVEL, TOTAL   LAMOTRIGINE LEVEL          EKG: All EKG's are interpreted by the Emergency Department Physician who either signs or Co-signs this chart in the absence of a cardiologist.       EKG Interpretation    Interpreted by emergency department physician      Alan Golden DO     RADIOLOGY:     Non-plain film images such as CT, Ultrasound and MRI are read by the radiologist. Plain radiographic images are visualized and preliminarily interpreted by the emergency physician. Interpretation per the Radiologist below, if available at the time of this note:    CT Head WO Contrast   Final Result   No acute intracranial abnormality. Chronic nonemergent findings as above.                ED BEDSIDE ULTRASOUND:   Performed by ED Physician Alan Golden DO       LABS:  Labs Reviewed   CBC WITH AUTO DIFFERENTIAL - Abnormal; Notable for the following components:       Result Value    RBC 4.16 (*)     Hematocrit 39.5 (*)     MCH 33.7 (*)     Segs Relative 67.5 (*)     Lymphocytes % 22.4 (*)     Monocytes % 8.3 (*)     All other components within normal limits   BASIC METABOLIC PANEL W/ REFLEX TO MG FOR LOW K - Abnormal; Notable for the following components:    CO2 20 (*)     Glucose 135 (*)     All other components within normal limits   URINALYSIS - Abnormal; Notable for the following components:    Clarity, UA SLIGHTLY CLOUDY (*)     All other components within normal limits   LACTATE DEHYDROGENASE - Abnormal; Notable for the following components:     (*)     All other components within normal limits   CULTURE, URINE   PHENYTOIN LEVEL, TOTAL   LAMOTRIGINE LEVEL       All other labs were within normal range or not returned as of this dictation. EMERGENCY DEPARTMENT COURSE and DIFFERENTIAL DIAGNOSIS/MDM:   Vitals:    Vitals:    06/04/21 2251 06/04/21 2254 06/05/21 0105   BP:  118/85 119/78   Pulse:  107 86   Resp:  18 18   Temp:  99.1 °F (37.3 °C)    SpO2:  93% 96%   Weight: 212 lb (96.2 kg)     Height: 6' (1.829 m)             MDM  Number of Diagnoses or Management Options  Seizure secondary to subtherapeutic anticonvulsant medication ([facility] [address]  Seizures ([facility] [address]  Diagnosis management comments: 74-year-old male presents emergency department due to epileptic seizure. Patient believes that he missed his last dose of seizure medications. Patient was given a Keppra load and seizure medications were also given in the emergency department. Other work-up was unremarkable. Patient is afebrile. Patient is very well-appearing. Will discharge patient to home with return precautions. Patient is to continue his current epileptic medications. Patient is to follow-up with neurology in the next 2 to 3 days for reevaluation.        Amount and/or Complexity of Data Reviewed  Clinical lab tests: ordered and reviewed  Tests in the radiology section of CPT®: ordered and reviewed  Tests in the medicine section of CPT®: ordered and reviewed    Risk of Complications, Morbidity, and/or Mortality  Presenting problems: moderate  Diagnostic procedures: moderate  Management options: moderate    Critical Care  Total time providing critical care: < 30 minutes    Patient Progress  Patient progress: improved      -  Patient seen and evaluated in the emergency department. -  Triage and nursing notes reviewed and incorporated. -  Old chart records reviewed and incorporated. -  Work-up included:  See above  -  Results discussed with patient. REASSESSMENT          CRITICAL CARE TIME     This excludes seperately billable procedures and family discussion time. Critical care time provided for obtaining history, conducting a physical exam, performing and monitoring interventions, ordering, collecting and interpreting tests, and establishing medical decision-making. There was a potential for life/limb threatening pathology requiring close evaluation and intervention with concern for patient decompensation. CONSULTS:  None    PROCEDURES:  None performed unless otherwise noted below     Procedures        FINAL IMPRESSION      1. Seizures ([facility] [address]    2. Seizure secondary to subtherapeutic anticonvulsant medication [facility])          DISPOSITION/PLAN   DISPOSITION Decision To Discharge 06/05/2021 03:51:04 AM      PATIENT REFERRED TO:  Keely Hobson MD  [address] [id number] [phone]    In 1 day        DISCHARGE MEDICATIONS:  Discharge Medication List as of 6/5/2021  3:59 AM          ED Provider Disposition Time  DISPOSITION Decision To Discharge 06/05/2021 03:51:04 AM      Appropriate personal protective equipment was worn during the patient's evaluation. These included surgical, eye protection, surgical mask or in 95 respirator and gloves. The patient was also placed in a surgical mask for the prevention of possible spread of respiratory viral illnesses. The Patient was instructed to read the package inserts with any medication that was prescribed. Major potential reactions and medication interactions were discussed.   The Patient understands that there are numerous possible adverse reactions not covered. The patient was also instructed to arrange follow-up with his or her primary care provider for review of any pending labwork or incidental findings on any radiology results that were obtained. All efforts were made to discuss any incidental findings that require further monitoring. Controlled Substances Monitoring:     No flowsheet data found.     (Please note that portions of this note were completed with a voice recognition program.  Efforts were made to edit the dictations but occasionally words are mis-transcribed.)    Drake Allen DO (electronically signed)  Attending Emergency Physician            Drake Allen DO  06/05/21 [id number]

## 2021-12-15 NOTE — PROGRESS NOTES
12/16/21    Steven Flores  1965    Chief Complaint   Patient presents with    Follow-up     MRI 9/1/2021 @ [facility].  Other     Pt reports being off balance for about a week now and has had a few falls. History of Present Illness  Eloy Frankel is a 61 y.o. male presenting [facility] office today in follow-up regarding history of traumatic brain injury and subsequent seizure disorder. On last visit with Dr. Pepe Chaudhary 08/04/2021 he reported that last seizure was 2 months prior to that appointment, indicated significant insomnia likely contributing to breakthrough seizure. MRI demonstrated sequela of traumatic brain injury with encephalomalacia of the bilateral frontal lobes contributing to memory dysfunction, hypersexuality and increase in propensity for seizure. There was possible meningioma/osteoma noted, did not feel that this was contributing to symptoms. He was initiated on melatonin 3 mg at bedtime for insomnia. Was reporting left upper extremity tremor, noted degree of hypophonia and hypomania possibility of a secondary parkinsonism due to antipsychotics including risperidone. On exam today, he denies any breakthrough seizure, nor have there been any changes to his medications. Per chart review he is continued on Dilantin and lamotrigine, we do not prescribe or monitor these medications but per chart review levels were checked 06/2021. At that time Dilantin was in therapeutic range and lamotrigine was noted to be subtherapeutic. Says that he has been sleeping better since starting the melatonin 3 mg at night as recommended now endorsing 7 to 8 hours of sleep. He notes that he has been having trouble walking, stumbling and tripping the last couple of months, says that last fall was approximately 2 weeks ago denies hitting his head, no one witnessed falls, no loss of consciousness.   On physical exam, I do see evidence of peripheral neuropathy, discussed with patient this could be contributing to feeling off balance as he looking down while ambulating through the office. He denies ever having EMG testing completed, I do not see this upon review of the chart. Last lab work obtained was in June of this year, discussed that would like to obtain some more blood work to further rule out any reversible causes. Current Outpatient Medications   Medication Sig Dispense Refill    finasteride (PROSCAR) 5 MG tablet TAKE 1 TABLET BY MOUTH DAILY      atorvastatin (LIPITOR) 10 MG tablet Take 10 mg by mouth daily      fenofibrate (TRICOR) 48 MG tablet Take 48 mg by mouth daily      XARELTO 20 MG TABS tablet TAKE 1 TABLET BY MOUTH DAILY      nystatin (MYCOSTATIN) [id number] UNIT/GM cream Apply topically 2 times daily Apply topically 2 times daily.  risperiDONE (RISPERDAL) 1 MG tablet Take 1 mg by mouth nightly      phenytoin (DILANTIN) 100 MG ER capsule Take 400 mg by mouth every morning      omeprazole (PRILOSEC) 20 MG delayed release capsule Take 20 mg by mouth daily      fluvoxaMINE (LUVOX) 100 MG tablet Take 200 mg by mouth nightly      Multiple Vitamins-Minerals (THERAPEUTIC MULTIVITAMIN-MINERALS) tablet Take 1 tablet by mouth daily      busPIRone (BUSPAR) 30 MG tablet Take 30 mg by mouth 2 times daily       lamoTRIgine (LAMICTAL) 100 MG tablet Take 100 mg by mouth 2 times daily       fluvoxamine (LUVOX) 100 MG tablet Take 100 mg by mouth every morning 1 tab in AM & 2 tabs @@ HS       clonazePAM (KLONOPIN) 0.5 MG tablet Take 0.5 mg by mouth three times daily. No current facility-administered medications for this visit. Physical Exam:  Also present during visit: none.   Constitutional  Weight: obese  Lungs: No evidence of respiratory distress    Mental Status   Orientation: oriented to person, oriented to place, oriented to problem and told me it was November, 2020    Mood/affectappropriate mood and appropriate affect   Memory/Other: Fund of knowledge intact, attention/concentration intact, he was able to list the months backwards starting with December but did tell me the wrong month and year initially and timeline of history provided was difficult to follow at times  Language  Language: (normal) language, no dysarthria, (normal) articulation and no dysphasia/aphasia  Cranial Nerves   Eyes: pupils normal size and reactive to light and visual fields appear full   CN III, IV, VI : extraocular muscle strength normal, normal pursuit, no nystagmus and no ptosis   Facial Motor: normal facial motor   CN XII: tongue protrudes midline  Motor/Coordination Exam   Power: no arm drift, normal tone and symmetric b/l  strength normal motor strength   Coordination: normal finger-to-nose, forearm rotation intact and rapid alternating movement normal  Sensory Exam No Bradykinesia, No Dyskindesia, No Tremor and No myoclonus light touch intact, diminished temperature, vibratory and pinprick sensation in bilateral lower extremities that is length dependent     Gait and Stance   Gait/Posture: station normal and Ambulates independently, unsteady casual gait, does tend to lean to the right and have to look down at the ground while walking        /80 (Site: Left Upper Arm, Position: Sitting, Cuff Size: Medium Adult)   Pulse 82   Ht 5' 11\" (1.803 m)   Wt 214 lb (97.1 kg)   SpO2 97%   BMI 29.85 kg/m²     Assessment and Plan     Diagnosis Orders   1. Traumatic brain injury with loss of consciousness, subsequent encounter     2. Seizure disorder ([address]     3. Insomnia, unspecified type     4. Secondary parkinsonism, unspecified secondary Parkinsonism type ([address]     5. Smoker     6. Idiopathic peripheral neuropathy  HEMOGLOBIN A1C    COMPREHENSIVE METABOLIC PANEL    TSH with Reflex    VITAMIN B12 & FOLATE    VITAMIN D 25 HYDROXY    PROTEIN ELECTROPHORESIS, SERUM    EMG     Mr. Gautam Bolivar presents in follow up regarding history of TBI and subsequent seizure disorder and abnormal MRI.      I did review MRI head imaging with Dr. Irwin Bhatti, felt that imaging demonstrated a sequela of the traumatic brain injury. Radiology saying 0.7 x 1 cm in the right frontal most suggestive of an osteoma or a partially calcified meningioma he did not feel this was contributory towards symptoms. If patient presenting with any new symptoms or any changes at next visit in 3 months we could consider repeat imaging I would recommend obtaining with contrast at that time. Patient denies any breakthrough seizures since last visit, we do not provide antiseizure medications, current regimen includes Dilantin and lamotrigine, levels last checked 06/2021, noted lamotrigine level was subtherapeutic. He does note he has been sleeping better, reporting 7 to 8 hours at night and will continue on 3 mg of melatonin as he says this is helped. He does note worsening of balance and gait instability, says that he has had a couple of falls most recently 2 weeks ago but denies hitting his head or any loss of consciousness. He does have clinical findings of peripheral neuropathy which were not noted at last visit, will obtain lab work to look for reversible causes as well as EMG to further investigate/determine extent of any nerve damage. He denies any pain any burning/tingling that would require pharmacological treatment including gabapentin. Pending results could recommend referral for PT/balance therapy to further help with fall prevention. We discussed prevention measures including being more cautious on uneven ground, turning on the light at night when walking to the bathroom. The patient will return to the office for follow-up visit in 3 months. On previous visits it was noted that a healthcare provider did accompany him on exam, feel this to be beneficial moving forward with exams to make sure a consistent history is provided. Thank you for allowing us to participate in the care of your patient.   If we can be of further assistance or any questions arise, please do not hesitate to contact us. Return in about 3 months (around 3/16/2022).     BRENDA Palumbo

## 2021-12-16 ENCOUNTER — OFFICE VISIT (OUTPATIENT)
Dept: NEUROLOGY | Age: 56
End: 2021-12-16
Payer: MEDICARE

## 2021-12-16 VITALS
SYSTOLIC BLOOD PRESSURE: 120 MMHG | OXYGEN SATURATION: 97 % | HEART RATE: 82 BPM | BODY MASS INDEX: 29.96 KG/M2 | HEIGHT: 71 IN | WEIGHT: 214 LBS | DIASTOLIC BLOOD PRESSURE: 80 MMHG

## 2021-12-16 DIAGNOSIS — G21.9 SECONDARY PARKINSONISM, UNSPECIFIED SECONDARY PARKINSONISM TYPE (HCC): ICD-10-CM

## 2021-12-16 DIAGNOSIS — F17.200 SMOKER: ICD-10-CM

## 2021-12-16 DIAGNOSIS — G47.00 INSOMNIA, UNSPECIFIED TYPE: ICD-10-CM

## 2021-12-16 DIAGNOSIS — G60.9 IDIOPATHIC PERIPHERAL NEUROPATHY: ICD-10-CM

## 2021-12-16 DIAGNOSIS — G40.909 SEIZURE DISORDER (HCC): ICD-10-CM

## 2021-12-16 DIAGNOSIS — S06.9X9D TRAUMATIC BRAIN INJURY WITH LOSS OF CONSCIOUSNESS, SUBSEQUENT ENCOUNTER: Primary | ICD-10-CM

## 2021-12-16 PROCEDURE — 4004F PT TOBACCO SCREEN RCVD TLK: CPT | Performed by: PHYSICIAN ASSISTANT

## 2021-12-16 PROCEDURE — 3017F COLORECTAL CA SCREEN DOC REV: CPT | Performed by: PHYSICIAN ASSISTANT

## 2021-12-16 PROCEDURE — G8419 CALC BMI OUT NRM PARAM NOF/U: HCPCS | Performed by: PHYSICIAN ASSISTANT

## 2021-12-16 PROCEDURE — G8427 DOCREV CUR MEDS BY ELIG CLIN: HCPCS | Performed by: PHYSICIAN ASSISTANT

## 2021-12-16 PROCEDURE — 99214 OFFICE O/P EST MOD 30 MIN: CPT | Performed by: PHYSICIAN ASSISTANT

## 2021-12-16 PROCEDURE — G8484 FLU IMMUNIZE NO ADMIN: HCPCS | Performed by: PHYSICIAN ASSISTANT

## 2021-12-18 ENCOUNTER — APPOINTMENT (OUTPATIENT)
Dept: CT IMAGING | Age: 56
End: 2021-12-18
Payer: MEDICARE

## 2021-12-18 ENCOUNTER — HOSPITAL ENCOUNTER (EMERGENCY)
Age: 56
Discharge: HOME | End: 2021-12-18
Attending: EMERGENCY MEDICINE
Payer: MEDICARE

## 2021-12-18 VITALS
WEIGHT: 215 LBS | TEMPERATURE: 97.3 F | DIASTOLIC BLOOD PRESSURE: 87 MMHG | HEART RATE: 81 BPM | RESPIRATION RATE: 16 BRPM | SYSTOLIC BLOOD PRESSURE: 118 MMHG | HEIGHT: 71 IN | OXYGEN SATURATION: 97 % | BODY MASS INDEX: 30.1 KG/M2

## 2021-12-18 DIAGNOSIS — R51.9 NONINTRACTABLE EPISODIC HEADACHE, UNSPECIFIED HEADACHE TYPE: ICD-10-CM

## 2021-12-18 DIAGNOSIS — R10.13 ABDOMINAL PAIN, EPIGASTRIC: Primary | ICD-10-CM

## 2021-12-18 DIAGNOSIS — K80.20 CALCULUS OF GALLBLADDER WITHOUT CHOLECYSTITIS WITHOUT OBSTRUCTION: ICD-10-CM

## 2021-12-18 LAB
ALBUMIN SERPL-MCNC: 4.4 GM/DL (ref 3.4–5)
ALP BLD-CCNC: 62 IU/L (ref 40–129)
ALT SERPL-CCNC: 14 U/L (ref 10–40)
ANION GAP SERPL CALCULATED.3IONS-SCNC: 14 MMOL/L (ref 4–16)
AST SERPL-CCNC: 18 IU/L (ref 15–37)
BACTERIA: NEGATIVE /HPF
BASOPHILS ABSOLUTE: 0.1 K/CU MM
BASOPHILS RELATIVE PERCENT: 1.3 % (ref 0–1)
BILIRUB SERPL-MCNC: 0.3 MG/DL (ref 0–1)
BILIRUBIN URINE: NEGATIVE MG/DL
BLOOD, URINE: NEGATIVE
BUN BLDV-MCNC: 14 MG/DL (ref 6–23)
CALCIUM SERPL-MCNC: 9 MG/DL (ref 8.3–10.6)
CAST TYPE: NORMAL /HPF
CHLORIDE BLD-SCNC: 107 MMOL/L (ref 99–110)
CLARITY: CLEAR
CO2: 24 MMOL/L (ref 21–32)
COLOR: YELLOW
CREAT SERPL-MCNC: 1 MG/DL (ref 0.9–1.3)
CRYSTAL TYPE: NEGATIVE /HPF
DIFFERENTIAL TYPE: ABNORMAL
EOSINOPHILS ABSOLUTE: 0.4 K/CU MM
EOSINOPHILS RELATIVE PERCENT: 6.4 % (ref 0–3)
EPITHELIAL CELLS, UA: 3 /HPF
GFR AFRICAN AMERICAN: >60 ML/MIN/1.73M2
GFR NON-AFRICAN AMERICAN: >60 ML/MIN/1.73M2
GLUCOSE BLD-MCNC: 86 MG/DL (ref 70–99)
GLUCOSE, URINE: NEGATIVE MG/DL
HCT VFR BLD CALC: 40.7 % (ref 42–52)
HEMOGLOBIN: 14.4 GM/DL (ref 13.5–18)
IMMATURE NEUTROPHIL %: 0.3 % (ref 0–0.43)
KETONES, URINE: NEGATIVE MG/DL
LEUKOCYTE ESTERASE, URINE: NEGATIVE
LIPASE: 36 IU/L (ref 13–60)
LYMPHOCYTES ABSOLUTE: 3.2 K/CU MM
LYMPHOCYTES RELATIVE PERCENT: 52.4 % (ref 24–44)
MCH RBC QN AUTO: 33 PG (ref 27–31)
MCHC RBC AUTO-ENTMCNC: 35.4 % (ref 32–36)
MCV RBC AUTO: 93.3 FL (ref 78–100)
MONOCYTES ABSOLUTE: 0.4 K/CU MM
MONOCYTES RELATIVE PERCENT: 7.2 % (ref 0–4)
NITRITE URINE, QUANTITATIVE: NEGATIVE
PDW BLD-RTO: 11.7 % (ref 11.7–14.9)
PH, URINE: 5 (ref 5–8)
PLATELET # BLD: 226 K/CU MM (ref 140–440)
PMV BLD AUTO: 9.3 FL (ref 7.5–11.1)
POTASSIUM SERPL-SCNC: 4.1 MMOL/L (ref 3.5–5.1)
PROTEIN UA: NEGATIVE MG/DL
RBC # BLD: 4.36 M/CU MM (ref 4.6–6.2)
RBC URINE: NORMAL /HPF (ref 0–3)
SEGMENTED NEUTROPHILS ABSOLUTE COUNT: 2 K/CU MM
SEGMENTED NEUTROPHILS RELATIVE PERCENT: 32.4 % (ref 36–66)
SODIUM BLD-SCNC: 145 MMOL/L (ref 135–145)
SPECIFIC GRAVITY UA: 1.03 (ref 1–1.03)
TOTAL IMMATURE NEUTOROPHIL: 0.02 K/CU MM
TOTAL PROTEIN: 7 GM/DL (ref 6.4–8.2)
UROBILINOGEN, URINE: 0.2 MG/DL (ref 0.2–1)
WBC # BLD: 6.1 K/CU MM (ref 4–10.5)
WBC UA: NORMAL /HPF (ref 0–2)

## 2021-12-18 PROCEDURE — 70450 CT HEAD/BRAIN W/O DYE: CPT

## 2021-12-18 PROCEDURE — 85025 COMPLETE CBC W/AUTO DIFF WBC: CPT

## 2021-12-18 PROCEDURE — 99283 EMERGENCY DEPT VISIT LOW MDM: CPT

## 2021-12-18 PROCEDURE — 80053 COMPREHEN METABOLIC PANEL: CPT

## 2021-12-18 PROCEDURE — 81001 URINALYSIS AUTO W/SCOPE: CPT

## 2021-12-18 PROCEDURE — 74176 CT ABD & PELVIS W/O CONTRAST: CPT

## 2021-12-18 PROCEDURE — 83690 ASSAY OF LIPASE: CPT

## 2021-12-18 ASSESSMENT — ENCOUNTER SYMPTOMS
SHORTNESS OF BREATH: 0
CONSTIPATION: 0
EYE REDNESS: 0
WHEEZING: 0
CHEST TIGHTNESS: 0
RHINORRHEA: 0
EYE ITCHING: 0
FACIAL SWELLING: 0
ANAL BLEEDING: 0
ABDOMINAL DISTENTION: 0
EYE DISCHARGE: 0
COUGH: 0
SINUS PRESSURE: 0
DIARRHEA: 0
PHOTOPHOBIA: 0
SORE THROAT: 0
ABDOMINAL PAIN: 1
VOMITING: 0
BLOOD IN STOOL: 0
BACK PAIN: 0
TROUBLE SWALLOWING: 0
EYE PAIN: 0
NAUSEA: 1
VOICE CHANGE: 0
STRIDOR: 0

## 2021-12-18 ASSESSMENT — PAIN SCALES - GENERAL: PAINLEVEL_OUTOF10: 10

## 2021-12-18 ASSESSMENT — PAIN DESCRIPTION - LOCATION: LOCATION: ABDOMEN

## 2021-12-18 NOTE — ED PROVIDER NOTES
Veda Allen is a 61year old male with a history of traumatic brain injury who presents to the ED with a headache and generalized abdominal discomfort that started last evening. He had some nausea but no emesis. His pain is worse after he eats, but he denies any specific food intolerance. He last moved his bowels this morning. He has been in the ED in the last few months for abdominal pain and was diagnosed with gallstones, but was asymptomatic at the time of follow up. His pain is currently \"Not too bad\". His headache is described as dull, bilateral temple area. No visual problems. NIH stroke score is 0. He denies recent or remote head trauma. /87   Pulse 81   Temp 97.3 °F (36.3 °C) (Infrared)   Resp 16   Ht 5' 11\" (1.803 m)   Wt 215 lb (97.5 kg)   SpO2 97%   BMI 29.99 kg/m²     I have reviewed the following from the nursing documentation:      Prior to Admission medications    Medication Sig Start Date End Date Taking? Authorizing Provider   finasteride (PROSCAR) 5 MG tablet TAKE 1 TABLET BY MOUTH DAILY 4/28/21   Historical Provider, MD   atorvastatin (LIPITOR) 10 MG tablet Take 10 mg by mouth daily    Historical Provider, MD   fenofibrate (TRICOR) 48 MG tablet Take 48 mg by mouth daily    Historical Provider, MD   XARELTO 20 MG TABS tablet TAKE 1 TABLET BY MOUTH DAILY 4/21/21   Historical Provider, MD   nystatin (MYCOSTATIN) 566689 UNIT/GM cream Apply topically 2 times daily Apply topically 2 times daily.     Historical Provider, MD   risperiDONE (RISPERDAL) 1 MG tablet Take 1 mg by mouth nightly    Historical Provider, MD   phenytoin (DILANTIN) 100 MG ER capsule Take 400 mg by mouth every morning    Historical Provider, MD   omeprazole (PRILOSEC) 20 MG delayed release capsule Take 20 mg by mouth daily    Historical Provider, MD   fluvoxaMINE (LUVOX) 100 MG tablet Take 200 mg by mouth nightly    Historical Provider, MD   Multiple Vitamins-Minerals (THERAPEUTIC MULTIVITAMIN-MINERALS) tablet Take 1 tablet by mouth daily    Historical Provider, MD   busPIRone (BUSPAR) 30 MG tablet Take 30 mg by mouth 2 times daily     Historical Provider, MD   lamoTRIgine (LAMICTAL) 100 MG tablet Take 100 mg by mouth 2 times daily     Historical Provider, MD   fluvoxamine (LUVOX) 100 MG tablet Take 100 mg by mouth every morning 1 tab in AM & 2 tabs @@ HS     Historical Provider, MD   clonazePAM (KLONOPIN) 0.5 MG tablet Take 0.5 mg by mouth three times daily. Historical Provider, MD       Allergies as of 12/18/2021 - Fully Reviewed 12/18/2021   Allergen Reaction Noted    Amoxicillin Other (See Comments) 01/13/2016    Bactrim [sulfamethoxazole-trimethoprim]  11/20/2012       Past Medical History:   Diagnosis Date    [facility] ([facility]     GERD (gastroesophageal reflux disease)     Hyperlipidemia     Hypertension     Seizures ([facility]         Surgical History: History reviewed. No pertinent surgical history. Family History:  History reviewed. No pertinent family history. Social History     Socioeconomic History    Marital status: Single     Spouse name: Not on file    Number of children: Not on file    Years of education: Not on file    Highest education level: Not on file   Occupational History    Not on file   Tobacco Use    Smoking status: Current Every Day Smoker     Packs/day: 0.50    Smokeless tobacco: Never Used   Vaping Use    Vaping Use: Never used   Substance and Sexual Activity    Alcohol use: No    Drug use: No    Sexual activity: Not on file   Other Topics Concern    Not on file   Social History Narrative    Not on file     Social Determinants of Health     Financial Resource Strain:     Difficulty of Paying Living Expenses: Not on file   Food Insecurity:     Worried About Running Out of Food in the Last Year: Not on file    Francesca of Food in the Last Year: Not on file   Transportation Needs:     Lack of Transportation (Medical):  Not on file    Lack of Transportation (Non-Medical): Not on file   Physical Activity:     Days of Exercise per Week: Not on file    Minutes of Exercise per Session: Not on file   Stress:     Feeling of Stress : Not on file   Social Connections:     Frequency of Communication with Friends and Family: Not on file    Frequency of Social Gatherings with Friends and Family: Not on file    Attends [employer] Services: Not on file    Active Member of [address] [place] [address] or Organizations: Not on file    Attends Club or Organization Meetings: Not on file    Marital Status: Not on file   Intimate Partner Violence:     Fear of Current or Ex-Partner: Not on file    Emotionally Abused: Not on file    Physically Abused: Not on file    Sexually Abused: Not on file   Housing Stability:     Unable to Pay for Housing in the Last Year: Not on file    Number of [place] in the Last Year: Not on file    Unstable Housing in the Last Year: Not on file         Review of Systems   Constitutional: Negative for activity change, appetite change, chills, diaphoresis, fatigue and fever. HENT: Negative. Negative for congestion, dental problem, ear pain, facial swelling, rhinorrhea, sinus pressure, sneezing, sore throat, tinnitus, trouble swallowing and voice change. Eyes: Negative for photophobia, pain, discharge, redness, itching and visual disturbance. Respiratory: Negative for cough, chest tightness, shortness of breath, wheezing and stridor. Cardiovascular: Negative for chest pain, palpitations and leg swelling. Gastrointestinal: Positive for abdominal pain (periumbilical) and nausea. Negative for abdominal distention, anal bleeding, blood in stool, constipation, diarrhea and vomiting. Genitourinary: Negative for difficulty urinating, dysuria, frequency, hematuria, penile discharge, testicular pain and urgency. Musculoskeletal: Negative for back pain, joint swelling, neck pain and neck stiffness. Skin: Negative for rash and wound.    Neurological: Positive for headaches. Negative for dizziness, syncope, facial asymmetry, speech difficulty, weakness and numbness. Hematological: Does not bruise/bleed easily. Psychiatric/Behavioral: Negative for agitation, confusion, hallucinations, self-injury, sleep disturbance and suicidal ideas. The patient is not nervous/anxious. All other systems reviewed and are negative. Physical Exam  Vitals and nursing note reviewed. Constitutional:       General: He is not in acute distress. Appearance: He is well-developed. HENT:      Head: Normocephalic and atraumatic. Right Ear: External ear normal.      Left Ear: External ear normal.      Nose: Nose normal.      Mouth/Throat:      Pharynx: No oropharyngeal exudate. Eyes:      General: No scleral icterus. Right eye: No discharge. Left eye: No discharge. Conjunctiva/sclera: Conjunctivae normal.      Pupils: Pupils are equal, round, and reactive to light. Neck:      Vascular: No JVD. Trachea: No tracheal deviation. Cardiovascular:      Rate and Rhythm: Normal rate and regular rhythm. Heart sounds: Normal heart sounds. No murmur heard. No friction rub. No gallop. Pulmonary:      Effort: Pulmonary effort is normal. No respiratory distress. Breath sounds: Normal breath sounds. No wheezing or rales. Abdominal:      General: Bowel sounds are normal. There is no distension. Palpations: Abdomen is soft. There is no mass. Tenderness: There is abdominal tenderness (epigastric). There is right CVA tenderness and left CVA tenderness. There is no guarding or rebound. Hernia: No hernia is present. Musculoskeletal:         General: No tenderness. Normal range of motion. Cervical back: Normal range of motion and neck supple. Lymphadenopathy:      Cervical: No cervical adenopathy. Skin:     General: Skin is warm and dry. Coloration: Skin is not pale. Findings: No erythema or rash.    Neurological: Mental Status: He is alert and oriented to person, place, and time. GCS: GCS eye subscore is 4. GCS verbal subscore is 5. GCS motor subscore is 6. Cranial Nerves: Cranial nerves are intact. No cranial nerve deficit. Sensory: Sensation is intact. Motor: Motor function is intact. No abnormal muscle tone. Coordination: Coordination is intact. Coordination normal.      Deep Tendon Reflexes: Reflexes are normal and symmetric. Reflexes normal.      Reflex Scores:       Bicep reflexes are 2+ on the right side and 2+ on the left side. Patellar reflexes are 2+ on the right side. Comments: Coordination, gait, speech, balance and cognition are intact. There is no nuchal rigidity or evidence of meningismus. Negative Kernig's and Brudzinski's signs. All sensory and motor components of the brachial/lumbosacral plexus tested are symmetric and intact. No focal deficits appreciated. Psychiatric:         Behavior: Behavior normal.         Thought Content:  Thought content normal.         Judgment: Judgment normal.          Procedures     MDM   Results for orders placed or performed during the hospital encounter of 12/18/21   Urinalysis   Result Value Ref Range    Color, UA YELLOW YELLOW    Clarity, UA CLEAR CLEAR    Glucose, Urine NEGATIVE NEGATIVE MG/DL    Bilirubin Urine NEGATIVE NEGATIVE MG/DL    Ketones, Urine NEGATIVE NEGATIVE MG/DL    Specific Gravity, UA 1.030 1.001 - 1.035    Blood, Urine NEGATIVE NEGATIVE    pH, Urine 5.0 5.0 - 8.0    Protein, UA NEGATIVE NEGATIVE MG/DL    Urobilinogen, Urine 0.2 0.2 - 1.0 MG/DL    Nitrite Urine, Quantitative NEGATIVE NEGATIVE    Leukocyte Esterase, Urine NEGATIVE NEGATIVE    RBC, UA NO CELLS SEEN 0 - 3 /HPF    WBC, UA NO CELLS SEEN 0 - 2 /HPF    Epithelial Cells, UA 3 /HPF    Cast Type NO CAST FORMS SEEN NO CAST FORMS SEEN /HPF    Bacteria, UA NEGATIVE NEGATIVE /HPF    Crystal Type NEGATIVE NEGATIVE /HPF   CBC auto diff   Result Value Ref Range WBC 6.1 4.0 - 10.5 K/CU MM    RBC 4.36 (L) 4.6 - 6.2 M/CU MM    Hemoglobin 14.4 13.5 - 18.0 GM/DL    Hematocrit 40.7 (L) 42 - 52 %    MCV 93.3 78 - 100 FL    MCH 33.0 (H) 27 - 31 PG    MCHC 35.4 32.0 - 36.0 %    RDW 11.7 11.7 - 14.9 %    Platelets 660 588 - 146 K/CU MM    MPV 9.3 7.5 - 11.1 FL    Differential Type AUTOMATED DIFFERENTIAL     Segs Relative 32.4 (L) 36 - 66 %    Lymphocytes % 52.4 (H) 24 - 44 %    Monocytes % 7.2 (H) 0 - 4 %    Eosinophils % 6.4 (H) 0 - 3 %    Basophils % 1.3 (H) 0 - 1 %    Segs Absolute 2.0 K/CU MM    Lymphocytes Absolute 3.2 K/CU MM    Monocytes Absolute 0.4 K/CU MM    Eosinophils Absolute 0.4 K/CU MM    Basophils Absolute 0.1 K/CU MM    Immature Neutrophil % 0.3 0 - 0.43 %    Total Immature Neutrophil 0.02 K/CU MM   CMP   Result Value Ref Range    Sodium 145 135 - 145 MMOL/L    Potassium 4.1 3.5 - 5.1 MMOL/L    Chloride 107 99 - 110 mMol/L    CO2 24 21 - 32 MMOL/L    BUN 14 6 - 23 MG/DL    CREATININE 1.0 0.9 - 1.3 MG/DL    Glucose 86 70 - 99 MG/DL    Calcium 9.0 8.3 - 10.6 MG/DL    Albumin 4.4 3.4 - 5.0 GM/DL    Total Protein 7.0 6.4 - 8.2 GM/DL    Total Bilirubin 0.3 0.0 - 1.0 MG/DL    ALT 14 10 - 40 U/L    AST 18 15 - 37 IU/L    Alkaline Phosphatase 62 40 - 129 IU/L    GFR Non-African American >60 >60 mL/min/1.73m2    GFR African American >60 >60 mL/min/1.73m2    Anion Gap 14 4 - 16   Lipase   Result Value Ref Range    Lipase 36 13 - 60 IU/L         I estimate there is LOW risk for ACUTE APPENDICITIS, BOWEL OBSTRUCTION, CHOLECYSTITIS, DIVERTICULITIS, INCARCERATED HERNIA, PANCREATITIS, or PERFORATED BOWEL or ULCER, thus I consider the discharge disposition reasonable. Also, there is no evidence or peritonitis, sepsis, or toxicity. Gunner Cantu and I have discussed the diagnosis and risks, and we agree with discharging home to follow-up with their primary doctor. We also discussed returning to the Emergency Department immediately if new or worsening symptoms occur.  We have discussed the symptoms which are most concerning (e.g., bloody stool, fever, changing or worsening pain, vomiting) that necessitate immediate return. FINAL Impression    1. Abdominal pain, epigastric    2. Calculus of gallbladder without cholecystitis without obstruction    3. Nonintractable episodic headache, unspecified headache type        Blood pressure 118/87, pulse 81, temperature 97.3 °F (36.3 °C), temperature source Infrared, resp. rate 16, height 5' 11\" (1.803 m), weight 215 lb (97.5 kg), SpO2 97 %. Radiology  CT ABDOMEN PELVIS WO CONTRAST Additional Contrast? None    Result Date: 12/18/2021  No acute abnormality identified. Cholelithiasis. CT HEAD WO CONTRAST    Result Date: 12/18/2021  1. No acute intracranial abnormality. 2. Postoperative changes and bifrontal encephalomalacia are similar to prior CT.  RECOMMENDATIONS: Nava Edwards MD  12/18/21 [id number] [id number] Hall MD  12/18/21 [id number]

## 2021-12-18 NOTE — ED TRIAGE NOTES
Pt with headache and abdominal pain that started last night, denies n/v/d. LBM this morning. Denies urinary symptoms.  States pain worse after eating

## 2022-01-12 ENCOUNTER — HOSPITAL ENCOUNTER (OUTPATIENT)
Age: 57
Setting detail: SPECIMEN
Discharge: HOME | End: 2022-01-12
Payer: MEDICARE

## 2022-01-12 LAB
ALBUMIN SERPL-MCNC: 4.3 GM/DL (ref 3.4–5)
ALP BLD-CCNC: 66 IU/L (ref 40–128)
ALT SERPL-CCNC: 13 U/L (ref 10–40)
ANION GAP SERPL CALCULATED.3IONS-SCNC: 13 MMOL/L (ref 4–16)
AST SERPL-CCNC: 23 IU/L (ref 15–37)
BASOPHILS ABSOLUTE: 0.1 K/CU MM
BASOPHILS RELATIVE PERCENT: 1 % (ref 0–1)
BILIRUB SERPL-MCNC: 0.3 MG/DL (ref 0–1)
BUN BLDV-MCNC: 18 MG/DL (ref 6–23)
CALCIUM SERPL-MCNC: 9.2 MG/DL (ref 8.3–10.6)
CHLORIDE BLD-SCNC: 103 MMOL/L (ref 99–110)
CHOLESTEROL: 140 MG/DL
CO2: 23 MMOL/L (ref 21–32)
CREAT SERPL-MCNC: 1 MG/DL (ref 0.9–1.3)
CREATININE URINE: 171.6 MG/DL (ref 39–259)
CREATININE URINE: 171.6 MG/DL (ref 39–259)
DIFFERENTIAL TYPE: ABNORMAL
EOSINOPHILS ABSOLUTE: 0.3 K/CU MM
EOSINOPHILS RELATIVE PERCENT: 4.9 % (ref 0–3)
ESTIMATED AVERAGE GLUCOSE: 105 MG/DL
GFR AFRICAN AMERICAN: >60 ML/MIN/1.73M2
GFR NON-AFRICAN AMERICAN: >60 ML/MIN/1.73M2
GLUCOSE BLD-MCNC: 96 MG/DL (ref 70–99)
HBA1C MFR BLD: 5.3 % (ref 4.2–6.3)
HCT VFR BLD CALC: 45.2 % (ref 42–52)
HDLC SERPL-MCNC: 55 MG/DL
HEMOGLOBIN: 15.1 GM/DL (ref 13.5–18)
IMMATURE NEUTROPHIL %: 0.4 % (ref 0–0.43)
LDL CHOLESTEROL DIRECT: 62 MG/DL
LYMPHOCYTES ABSOLUTE: 2.6 K/CU MM
LYMPHOCYTES RELATIVE PERCENT: 38.9 % (ref 24–44)
MCH RBC QN AUTO: 33.3 PG (ref 27–31)
MCHC RBC AUTO-ENTMCNC: 33.4 % (ref 32–36)
MCV RBC AUTO: 99.8 FL (ref 78–100)
MICROALBUMIN/CREAT 24H UR: <1.2 MG/DL
MICROALBUMIN/CREAT UR-RTO: NORMAL MG/G CREAT (ref 0–30)
MONOCYTES ABSOLUTE: 0.6 K/CU MM
MONOCYTES RELATIVE PERCENT: 9 % (ref 0–4)
NUCLEATED RBC %: 0 %
PDW BLD-RTO: 11.4 % (ref 11.7–14.9)
PLATELET # BLD: 215 K/CU MM (ref 140–440)
PMV BLD AUTO: 9 FL (ref 7.5–11.1)
POTASSIUM SERPL-SCNC: 4.3 MMOL/L (ref 3.5–5.1)
PROT/CREAT RATIO, UR: 0.1
RBC # BLD: 4.53 M/CU MM (ref 4.6–6.2)
SEGMENTED NEUTROPHILS ABSOLUTE COUNT: 3.1 K/CU MM
SEGMENTED NEUTROPHILS RELATIVE PERCENT: 45.8 % (ref 36–66)
SODIUM BLD-SCNC: 139 MMOL/L (ref 135–145)
TOTAL IMMATURE NEUTOROPHIL: 0.03 K/CU MM
TOTAL NUCLEATED RBC: 0 K/CU MM
TOTAL PROTEIN: 6.9 GM/DL (ref 6.4–8.2)
TRIGL SERPL-MCNC: 133 MG/DL
URINE TOTAL PROTEIN: 12.6 MG/DL
WBC # BLD: 6.8 K/CU MM (ref 4–10.5)

## 2022-01-12 PROCEDURE — 82570 ASSAY OF URINE CREATININE: CPT

## 2022-01-12 PROCEDURE — 82043 UR ALBUMIN QUANTITATIVE: CPT

## 2022-01-12 PROCEDURE — 85025 COMPLETE CBC W/AUTO DIFF WBC: CPT

## 2022-01-12 PROCEDURE — 84156 ASSAY OF PROTEIN URINE: CPT

## 2022-01-12 PROCEDURE — 80053 COMPREHEN METABOLIC PANEL: CPT

## 2022-01-12 PROCEDURE — 83036 HEMOGLOBIN GLYCOSYLATED A1C: CPT

## 2022-01-12 PROCEDURE — 36415 COLL VENOUS BLD VENIPUNCTURE: CPT

## 2022-01-12 PROCEDURE — 80061 LIPID PANEL: CPT

## 2022-01-12 PROCEDURE — 83721 ASSAY OF BLOOD LIPOPROTEIN: CPT

## 2022-02-11 ENCOUNTER — TELEPHONE (OUTPATIENT)
Dept: NEUROLOGY | Age: 57
End: 2022-02-11

## 2022-02-11 NOTE — TELEPHONE ENCOUNTER
Clayton Moss from [address] and stated she needed test results for the patient. Called her back and was confused as to what exactly she was asking about, but Clayton Moss states she meant to call the PCP for the patient and she did and got the results.  We can disregard the request.

## 2022-02-17 ENCOUNTER — HOSPITAL ENCOUNTER (OUTPATIENT)
Age: 57
Setting detail: SPECIMEN
Discharge: HOME | End: 2022-02-17
Payer: MEDICARE

## 2022-02-17 LAB
ALBUMIN SERPL-MCNC: 4 GM/DL (ref 3.4–5)
ALP BLD-CCNC: 59 IU/L (ref 40–128)
ALT SERPL-CCNC: 13 U/L (ref 10–40)
ANION GAP SERPL CALCULATED.3IONS-SCNC: 12 MMOL/L (ref 4–16)
AST SERPL-CCNC: 16 IU/L (ref 15–37)
BILIRUB SERPL-MCNC: 0.2 MG/DL (ref 0–1)
BUN BLDV-MCNC: 19 MG/DL (ref 6–23)
CALCIUM SERPL-MCNC: 8.9 MG/DL (ref 8.3–10.6)
CHLORIDE BLD-SCNC: 105 MMOL/L (ref 99–110)
CHOLESTEROL: 122 MG/DL
CO2: 23 MMOL/L (ref 21–32)
CREAT SERPL-MCNC: 1 MG/DL (ref 0.9–1.3)
CREATININE URINE: 164.5 MG/DL (ref 39–259)
ESTIMATED AVERAGE GLUCOSE: 105 MG/DL
GFR AFRICAN AMERICAN: >60 ML/MIN/1.73M2
GFR NON-AFRICAN AMERICAN: >60 ML/MIN/1.73M2
GLUCOSE BLD-MCNC: 119 MG/DL (ref 70–99)
HBA1C MFR BLD: 5.3 % (ref 4.2–6.3)
HDLC SERPL-MCNC: 41 MG/DL
LDL CHOLESTEROL CALCULATED: 47 MG/DL
MICROALBUMIN/CREAT 24H UR: <1.2 MG/DL
MICROALBUMIN/CREAT UR-RTO: NORMAL MG/G CREAT (ref 0–30)
POTASSIUM SERPL-SCNC: 3.9 MMOL/L (ref 3.5–5.1)
SODIUM BLD-SCNC: 140 MMOL/L (ref 135–145)
TOTAL CK: 88 IU/L (ref 38–174)
TOTAL PROTEIN: 6.3 GM/DL (ref 6.4–8.2)
TRIGL SERPL-MCNC: 170 MG/DL

## 2022-02-17 PROCEDURE — 82550 ASSAY OF CK (CPK): CPT

## 2022-02-17 PROCEDURE — 83036 HEMOGLOBIN GLYCOSYLATED A1C: CPT

## 2022-02-17 PROCEDURE — 82043 UR ALBUMIN QUANTITATIVE: CPT

## 2022-02-17 PROCEDURE — 36415 COLL VENOUS BLD VENIPUNCTURE: CPT

## 2022-02-17 PROCEDURE — 80061 LIPID PANEL: CPT

## 2022-02-17 PROCEDURE — 82570 ASSAY OF URINE CREATININE: CPT

## 2022-02-17 PROCEDURE — 80053 COMPREHEN METABOLIC PANEL: CPT

## 2022-02-28 ENCOUNTER — OFFICE VISIT (OUTPATIENT)
Dept: NEUROLOGY | Age: 57
End: 2022-02-28
Payer: MEDICARE

## 2022-02-28 DIAGNOSIS — R27.0 ATAXIA: Primary | ICD-10-CM

## 2022-02-28 PROCEDURE — 95886 MUSC TEST DONE W/N TEST COMP: CPT | Performed by: PHYSICAL MEDICINE & REHABILITATION

## 2022-02-28 PROCEDURE — 95910 NRV CNDJ TEST 7-8 STUDIES: CPT | Performed by: PHYSICAL MEDICINE & REHABILITATION

## 2022-02-28 NOTE — PROGRESS NOTES
EMG    Risks and benefits of study discussed. Specific and common risks of pain and bleeding as well as uncommon side effects of infection, hematoma and vasovagal episodes    Patient agreeable to testing and consents to such. Clinical: Lower limb weakness, unsteadiness, paresthesias. Unspecified duration. Motor NCS:  Tibial amplitudes, latencies and velocities normal  Peroneal amplitudes, latencies, velocities normal    Sensory NCS:  Peroneal and sural amplitudes normal bilateral; peroneal latencies normal sural latencies mildly prolonged    Needle EMG: Normal findings    Impression:  #1 overall normal study of the bilateral lower limbs, with the only notable abnormality of borderline slow sural sensory response latencies. This very subtle abnormality is of indeterminate significance; it may be an incidental finding, or alternatively very early evidence of a sensorimotor peripheral neuropathy process. Further clinical correlation recommended. #2 no evidence of lumbosacral radiculopathy or plexopathy, myopathy or mononeuropathy in the lower limbs.

## 2022-03-27 NOTE — PROGRESS NOTES
3/29/22    Norma Gutierrez  1965    Chief Complaint   Patient presents with    Follow-up     History of TBI,Seems a little unsteady at times       History of Present Illness  Chaparro Boles is a 61 y.o. male presenting [facility] office today in follow-up regarding history of traumatic brain injury and subsequent seizure disorder. On last visit 12/16/2021, patient had denied any breakthrough seizures with current regimen including Dilantin and lamotrigine. MRI head imaging was reviewed with Dr. Trista Barba who felt that findings of right frontal osteoma or a partially calcified meningioma was not contributory towards symptoms. However, were going to monitor and if any new symptoms recommended repeat imaging with contrast.  Patient was getting better sleep, 7 hours a night continued on 3 mg melatonin. He was endorsing worsening of balance and gait instability, several falls with clinical findings of neuropathy. Lab work to investigate for reversible causes and EMG were ordered to further investigate. EMG findings were overall normal with some borderline slow sensory response, very subtle and of indeterminate significance. Lab work including B12, folate, TSH, vitamin D and electrophoresis was not completed. On exam today, he continues to endorse gait disturbance/feeling off balance. There were clinical exam findings of peripheral neuropathy at last visit and EMG indicating some possible slow sensory response. Lab work completed by primary care was reviewed and grossly noncontributory. A few labs including B12, folate, vitamin D were not completed but printed these forms to ensure they were done. Patient denies any falls but says he did not his head on an upper cabinet couple weeks ago causing headache at the time but denies any headache on exam today. He continues to sleep well, 7 to 8 hours a night and denies any breakthrough seizure.         Current Outpatient Medications   Medication Sig Dispense Refill    fenofibrate (TRIGLIDE) 160 MG tablet Take 160 mg by mouth daily      melatonin 3 MG TABS tablet Take 3 mg by mouth daily      finasteride (PROSCAR) 5 MG tablet TAKE 1 TABLET BY MOUTH DAILY      atorvastatin (LIPITOR) 10 MG tablet Take 10 mg by mouth daily      XARELTO 20 MG TABS tablet TAKE 1 TABLET BY MOUTH DAILY      risperiDONE (RISPERDAL) 1 MG tablet Take 1 mg by mouth nightly      phenytoin (DILANTIN) 100 MG ER capsule Take 400 mg by mouth every morning      omeprazole (PRILOSEC) 20 MG delayed release capsule Take 20 mg by mouth daily      fluvoxaMINE (LUVOX) 100 MG tablet Take 200 mg by mouth nightly      Multiple Vitamins-Minerals (THERAPEUTIC MULTIVITAMIN-MINERALS) tablet Take 1 tablet by mouth daily      busPIRone (BUSPAR) 30 MG tablet Take 30 mg by mouth 2 times daily       lamoTRIgine (LAMICTAL) 100 MG tablet Take 100 mg by mouth 2 times daily       clonazePAM (KLONOPIN) 0.5 MG tablet Take 0.5 mg by mouth three times daily. No current facility-administered medications for this visit.        Physical Exam:  Also present during visit: Health care worker  Constitutional  Weight: obese  Lungs: No evidence of respiratory distress    Mental Status   Orientation: Oriented to self, place   Mood/affectappropriate mood and appropriate affect   Memory/Other: Fund of knowledge intact, attention/concentration intact, he was able to list the months backwards starting with December but did tell me the wrong month and year initially and timeline of history provided was difficult to follow at times  Language  Language: (normal) language, no dysarthria, (normal) articulation and no dysphasia/aphasia  Cranial Nerves   Eyes: pupils normal size and reactive to light and visual fields appear full   CN III, IV, VI : extraocular muscle strength normal, normal pursuit, no nystagmus and no ptosis   Facial Motor: normal facial motor   CN XII: tongue protrudes midline  Motor/Coordination Exam   Power: no arm drift, normal tone and symmetric b/l  strength normal motor strength   Coordination: normal finger-to-nose, forearm rotation intact and rapid alternating movement normal  Sensory Exam No Bradykinesia, No Dyskindesia, No Tremor and No myoclonus light touch intact, diminished temperature, vibratory and pinprick sensation in bilateral lower extremities that is length dependent     Gait and Stance   Gait/Posture: station normal and Ambulates independently, unsteady casual gait, does tend to lean to the right and have to look down at the ground while walking        /72 (Site: Left Upper Arm, Position: Sitting, Cuff Size: Large Adult)   Pulse 79   Ht 5' 11\" (1.803 m)   Wt 215 lb (97.5 kg)   SpO2 96%   BMI 29.99 kg/m²     Assessment and Plan     Diagnosis Orders   1. Traumatic brain injury with loss of consciousness, subsequent encounter     2. Seizure disorder ([address]     3. Insomnia, unspecified type     4. Secondary parkinsonism, unspecified secondary Parkinsonism type ([address]     5. Smoker     6. Idiopathic peripheral neuropathy     7. Gait instability  Ambulatory referral to Physical Therapy     Mr. Tiana Tuttle presents in follow up regarding history of TBI and subsequent seizure disorder and abnormal MRI. Patient denies any breakthrough seizures since last visit, we do not provide antiseizure medications, current regimen includes Dilantin and lamotrigine, levels last checked 06/2021, noted lamotrigine level was subtherapeutic. He endorses getting 7 to 8 hours of sleep at night and will continue on 3 mg of melatonin. Patient continues to endorse disturbance of balance and gait instability, notes hitting his head on the cabinet but no falls or loss of consciousness. We reviewed the results of EMG testing, clinical exam findings of peripheral neuropathy.   I printed labs including vitamin B12, folate, vitamin D which were not completed since last visit and would like to ensure that these are followed up on to ensure no other contributing factors for neuropathy findings. He continues to deny any pain any burning/tingling that would require pharmacological treatment including gabapentin. Referral for PT/balance therapy to further help with fall prevention placed on exam today. We discussed prevention measures including being more cautious on uneven ground, turning on the light at night when walking to the bathroom. Patient not endorsing any change in symptoms that would warrant repeat MRI at this time. We will continue to monitor and if any changes would obtain MRI with contrast.    The patient will return to the office for follow-up visit in 3 months. Thank you for allowing us to participate in the care of your patient. If we can be of further assistance or any questions arise, please do not hesitate to contact us. Return in about 3 months (around 6/29/2022).     BRENDA Pavon

## 2022-03-29 ENCOUNTER — OFFICE VISIT (OUTPATIENT)
Dept: NEUROLOGY | Age: 57
End: 2022-03-29
Payer: MEDICARE

## 2022-03-29 VITALS
SYSTOLIC BLOOD PRESSURE: 120 MMHG | HEART RATE: 79 BPM | HEIGHT: 71 IN | OXYGEN SATURATION: 96 % | BODY MASS INDEX: 30.1 KG/M2 | WEIGHT: 215 LBS | DIASTOLIC BLOOD PRESSURE: 72 MMHG

## 2022-03-29 DIAGNOSIS — F17.200 SMOKER: ICD-10-CM

## 2022-03-29 DIAGNOSIS — G60.9 IDIOPATHIC PERIPHERAL NEUROPATHY: ICD-10-CM

## 2022-03-29 DIAGNOSIS — G40.909 SEIZURE DISORDER (HCC): ICD-10-CM

## 2022-03-29 DIAGNOSIS — G21.9 SECONDARY PARKINSONISM, UNSPECIFIED SECONDARY PARKINSONISM TYPE (HCC): ICD-10-CM

## 2022-03-29 DIAGNOSIS — S06.9X9D TRAUMATIC BRAIN INJURY WITH LOSS OF CONSCIOUSNESS, SUBSEQUENT ENCOUNTER: Primary | ICD-10-CM

## 2022-03-29 DIAGNOSIS — R26.81 GAIT INSTABILITY: ICD-10-CM

## 2022-03-29 DIAGNOSIS — G47.00 INSOMNIA, UNSPECIFIED TYPE: ICD-10-CM

## 2022-03-29 PROCEDURE — 99214 OFFICE O/P EST MOD 30 MIN: CPT | Performed by: PHYSICIAN ASSISTANT

## 2022-03-29 PROCEDURE — G8419 CALC BMI OUT NRM PARAM NOF/U: HCPCS | Performed by: PHYSICIAN ASSISTANT

## 2022-03-29 PROCEDURE — G8484 FLU IMMUNIZE NO ADMIN: HCPCS | Performed by: PHYSICIAN ASSISTANT

## 2022-03-29 PROCEDURE — 4004F PT TOBACCO SCREEN RCVD TLK: CPT | Performed by: PHYSICIAN ASSISTANT

## 2022-03-29 PROCEDURE — 3017F COLORECTAL CA SCREEN DOC REV: CPT | Performed by: PHYSICIAN ASSISTANT

## 2022-03-29 PROCEDURE — G8427 DOCREV CUR MEDS BY ELIG CLIN: HCPCS | Performed by: PHYSICIAN ASSISTANT

## 2022-03-29 RX ORDER — LANOLIN ALCOHOL/MO/W.PET/CERES
3 CREAM (GRAM) TOPICAL DAILY
COMMUNITY
End: 2022-07-25

## 2022-03-29 RX ORDER — FENOFIBRATE 160 MG/1
160 TABLET ORAL DAILY
COMMUNITY

## 2022-04-12 ENCOUNTER — HOSPITAL ENCOUNTER (OUTPATIENT)
Dept: PHYSICAL THERAPY | Age: 57
Setting detail: THERAPIES SERIES
Discharge: HOME | End: 2022-04-12
Payer: MEDICARE

## 2022-04-12 PROCEDURE — 97535 SELF CARE MNGMENT TRAINING: CPT

## 2022-04-12 PROCEDURE — 97161 PT EVAL LOW COMPLEX 20 MIN: CPT

## 2022-04-12 PROCEDURE — 97110 THERAPEUTIC EXERCISES: CPT

## 2022-04-12 NOTE — PROGRESS NOTES
Physical Therapy  Initial Assessment  Date: 2022  Patient Name: Grupo Chester  MRN: [id number]  : 1965     Treatment Diagnosis: Balance/gait deficits    Subjective   General  Chart Reviewed: Yes  Patient assessed for rehabilitation services?: Yes  Referring Practitioner: Roslyn GUTIERREZ  Diagnosis: gait isntability  Follows Commands: Within Functional Limits  PT Visit Information  PT Insurance Information: [facility] medicare  Subjective  Subjective: Patient states his balance/gait has been bad the last 3 weeks. PMH: brain injury and seizure, pt does not recall. Reports falling onto the ground in the last 3 weeks. States it has been a month since his last seizure. Difficulties with LOB doing activities around the house. Lives with a [address] [place] [address] Amarilis who is a friend, unable to help him around the house. He lives in a group home, where someone jonh ble to grocery shop for him. He cleans, but has someone cook for him. He does not drive.   Pain Screening  Patient Currently in Pain: No  Vital Signs  Patient Currently in Pain: No    Vision/Hearing  Vision  Vision: Within Functional Limits  Hearing  Hearing: Within functional limits    Orientation  Orientation  Overall Orientation Status: Within Normal Limits    Social/Functional History  Social/Functional History  Lives With: Friend(s)  Type of Home: Apartment  Home Layout: One level  Home Access: Level entry  Bathroom Shower/Tub: Walk-in shower  Bathroom Toilet: Standard  Bathroom Accessibility: Accessible  Active : No  Patient's  Info: driving service  Occupation: On disability    Objective  Observation/Palpation  Posture: Fair  Palpation: No tenderness reported  Observation: No AD, noted L inattention and frequent LOB with gait; pt would frequently get distracted    PROM RLE (degrees)  RLE PROM: WNL  AROM RLE (degrees)  RLE AROM: WNL  PROM LLE (degrees)  LLE PROM: WNL  AROM LLE (degrees)  LLE AROM : WNL    Strength RLE  Comment: RLE strength: 4/5 hip flexion, 4/5 knee flexion, 4+/5 knee ext  (difficulties following commands)  Strength LLE  Comment: LLE strength: 5/5 all directions  Strength Other  Other: 30 sec STS: 5 times with UE assist, no increased pain     Additional Measures  Other: Torrez Balance: 23/566MWT: 740 ft with CGA from therapist on gait belt, L inattention noted and patient ran into several objects. Sensation  Overall Sensation Status: WNL    Assessment   Conditions Requiring Skilled Therapeutic Intervention  Body structures, Functions, Activity limitations: Decreased functional mobility ; Decreased ADL status; Decreased posture;Decreased ROM; Decreased strength;Decreased endurance  Pt is 61year old male with worsening balance and gait deficits. PMH includes a TBI in 2019, seizures, and R frontal osteoma/partial calcified meningioma (being followed by Dr. Luciano Hinojosa). Pt now has difficulties with increased fall risk LOB with daily activities, and getting out of bed. He lives in a assisted living with a roommate and receives assistance with grocery shopping, cleaning, and cooking according to the patient. He does not currently drive. Pt demo deficits this date that include high fall risk as classified by the [facility] Balance assessment, reduced ability to follow 2 step commands, poor historian, reduced BLE strength, L inattention with gait activities, impairment in sensation in bilat feet. Pt will benefit with PT services with balance/proprioception, gait/stair training, BLE strengthening, core stability, education on safety to return to PLOF. Patient understood discussion and questions were answered. Patient understands their activity limitations and understands rational for treatment progression.    Treatment Diagnosis: Balance/gait deficits  Prognosis: Good  Decision Making: Low Complexity  History: TBI 2019  Barriers to Learning: None -prefers demo  REQUIRES PT FOLLOW UP: Yes  Activity Tolerance  Activity Tolerance: Patient Tolerated treatment well Plan   Plan  Times per week: 1x  Plan weeks: 8 weeks  Current Treatment Recommendations: Strengthening,Neuromuscular Re-education,Home Exercise Program,ROM,Balance Training,Endurance Training,Modalities,Stair training,Gait Training    Goals  Short term goals  Time Frame for Short term goals: Pt demo I w/ HEP and symptom management  Short term goal 1: Pt demo >5 time sit to  30 seconds with UE assist to improve functional transfers  Short term goal 2: Pt demo >500 ft in 6 minutes with no verbal cues for attention to surroundings/obstacles  Short term goal 3: Pt demo >30/56 Torrez Balance assessment to improve fall risk  Short term goal 4: Pt demo 5/5 BLE strength in all directions to improve tolerance to functional transfers  Patient Goals   Patient goals : improve balance    Mira Martinez, PT, DPT, CSCS

## 2022-04-12 NOTE — PLAN OF CARE
Outpatient Physical Therapy           [place]           [] Phone: [phone]   Fax: [phone]  [place]           [] Phone: [phone]   Fax: [phone]     To: Referring Practitioner: Lovely GUTIERREZ   From: Robert Jarvis PT     Patient: Sahil Santana       : 1965  Diagnosis: Diagnosis: gait isntability   Treatment Diagnosis: Treatment Diagnosis: Balance/gait deficits   Date: 2022    Physical Therapy Certification/Re-Certification Form  Dear Lovely GUTIERREZ,  The following patient has been evaluated for physical therapy services and for therapy to continue, insurance requires physician review of the treatment plan initially and every 90 days. Please review the attached evaluation and/or summary of the patient's plan of care, and verify that you agree therapy should continue by signing the attached document and sending it back to our office. Assessment:  Pt is 61year old male with worsening balance and gait deficits. PMH includes a TBI in 2019, seizures, and R frontal osteoma/partial calcified meningioma (being followed by Dr. Sylwia Limon). Pt now has difficulties with increased fall risk LOB with daily activities, and getting out of bed. He lives in a assisted living with a roommate and receives assistance with grocery shopping, cleaning, and cooking according to the patient. He does not currently drive. Pt demo deficits this date that include high fall risk as classified by the [facility] Balance assessment, reduced ability to follow 2 step commands, poor historian, reduced BLE strength, L inattention with gait activities, impairment in sensation in bilat feet. Pt will benefit with PT services with balance/proprioception, gait/stair training, BLE strengthening, core stability, education on safety to return to [facility]. Patient understood discussion and questions were answered. Patient understands their activity limitations and understands rational for treatment progression.         Plan of

## 2022-04-12 NOTE — FLOWSHEET NOTE
Outpatient Physical Therapy  [place]           [x] Phone: [phone]   Fax: [phone]  [facility]           [] Phone: [phone]   Fax: [phone]        Physical Therapy Daily Treatment Note  Date:  2022    Patient Name:  Wendelyn Canavan    :  1965  MRN: [id number]  Restrictions/Precautions: NONE  Diagnosis:   Diagnosis: gait isntability  Date of Injury/Surgery:  TBI  Treatment Diagnosis: Treatment Diagnosis: Balance/gait deficits    Insurance/Certification information: PT Insurance Information: [facility] medicare   Referring Physician:  Referring Practitioner: Aureliano GUTIERREZ  Next Doctor Visit:  --  Plan of care signed (Y/N):  N, sent 22  Outcome Measure: Kody Hanson Balance:  Visit# / total visits:     Pain level: 0/10   Goals:     Patient goals : improve balance  Short term goals  Time Frame for Short term goals: Pt demo I w/ HEP and symptom management  Short term goal 1: Pt demo >5 time sit to  30 seconds with UE assist to improve functional transfers  Short term goal 2: Pt demo >500 ft in 6 minutes with no verbal cues for attention to surroundings/obstacles  Short term goal 3: Pt demo >30/56 Torrez Balance assessment to improve fall risk  Short term goal 4: Pt demo 5/5 BLE strength in all directions to improve tolerance to functional transfers     Summary of Evaluation:  Pt is 61year old male with worsening balance and gait deficits. PMH includes a TBI in 2019, seizures, and R frontal osteoma/partial calcified meningioma (being followed by Dr. Paola Snowden). Pt now has difficulties with increased fall risk LOB with daily activities, and getting out of bed. He lives in a assisted living with a roommate and receives assistance with grocery shopping, cleaning, and cooking according to the patient. He does not currently drive.  Pt demo deficits this date that include high fall risk as classified by the Kody Hanson Balance assessment, reduced ability to follow 2 step commands, poor historian, reduced BLE strength, L inattention with gait activities, impairment in sensation in bilat feet. Pt will benefit with PT services with balance/proprioception, gait/stair training, BLE strengthening, core stability, education on safety to return to PLOF. Patient understood discussion and questions were answered. Patient understands their activity limitations and understands rational for treatment progression. Subjective:  See catalina         Any changes in Ambulatory Summary Sheet? None        Objective:  See eval   COVID screening questions were asked and patient attested that there had been no contact or symptoms    *prefers to go by Allison Yañez    Exercises: (No more than 4 columns)   Exercise/Equipment 4/12/22 #1 Date Date           WARM UP                     TABLE      *Seated March      *SLR                           STANDING      *sit to stand                                                PROPRIOCEPTION      Airex      Tandem      Wobble Board                  MODALITIES                      Other Therapeutic Activities/Education:  Patient received education on their current pathology and how their condition effects them with their functional activities. Patient understood discussion and questions were answered. Patient understands their activity limitations and understands rational for treatment progression. Home Exercise Program:  HO issued, reviewed and discussed with patient. Pt agreed to comply. Manual Treatments:  --      Modalities:  --      Communication with other providers:  catalina sent 4/12/22      Assessment:  (Response towards treatment session) (Pain Rating)     Pt is 61year old male with worsening balance and gait deficits. PMH includes a TBI in 2019, seizures, and R frontal osteoma/partial calcified meningioma (being followed by Dr. Jennifer Lopez). Pt now has difficulties with increased fall risk LOB with daily activities, and getting out of bed.  He lives in a assisted living with a roommate and receives assistance with grocery shopping, cleaning, and cooking according to the patient. He does not currently drive. Pt demo deficits this date that include high fall risk as classified by the [facility] Balance assessment, reduced ability to follow 2 step commands, poor historian, reduced BLE strength, L inattention with gait activities, impairment in sensation in bilat feet. Pt will benefit with PT services with balance/proprioception, gait/stair training, BLE strengthening, core stability, education on safety to return to [facility]. Patient understood discussion and questions were answered. Patient understands their activity limitations and understands rational for treatment progression.        Plan for Next Session:  --      Time In / Time Out:    8388-5398      Timed Code/Total Treatment Minutes:  40'    (1) PT eval    (1) TE  (1) ADL      Next Progress Note due:  10th visit      Plan of Care Interventions:  [x] Therapeutic Exercise  [] Modalities:  [x] Therapeutic Activity     [] Ultrasound  [] Estim  [] Gait Training      [] Cervical Traction [] Lumbar Traction  [x] Neuromuscular Re-education    [] Cold/hotpack [] Iontophoresis   [x] Instruction in HEP      [] Vasopneumatic   [] Dry Needling    [] Manual Therapy               [] Aquatic Therapy              Electronically signed by:  Lazara Bryant PT, DPT, CSCS 4/12/2022, 6:43 AM

## 2022-04-25 ENCOUNTER — HOSPITAL ENCOUNTER (OUTPATIENT)
Age: 57
Setting detail: SPECIMEN
Discharge: HOME | End: 2022-04-25
Payer: MEDICARE

## 2022-04-25 LAB — PSA ULTRASENSITIVE: 1.3 NG/ML (ref 0–4)

## 2022-04-25 PROCEDURE — 84153 ASSAY OF PSA TOTAL: CPT

## 2022-04-25 PROCEDURE — 36415 COLL VENOUS BLD VENIPUNCTURE: CPT

## 2022-04-25 PROCEDURE — 84154 ASSAY OF PSA FREE: CPT

## 2022-04-26 ENCOUNTER — HOSPITAL ENCOUNTER (OUTPATIENT)
Dept: PHYSICAL THERAPY | Age: 57
Setting detail: THERAPIES SERIES
Discharge: HOME | End: 2022-04-26
Payer: MEDICARE

## 2022-04-26 PROCEDURE — 97530 THERAPEUTIC ACTIVITIES: CPT

## 2022-04-26 PROCEDURE — 97110 THERAPEUTIC EXERCISES: CPT

## 2022-04-26 NOTE — FLOWSHEET NOTE
Outpatient Physical Therapy  [place]           [x] Phone: [phone]   Fax: [phone]  [place]           [] Phone: [phone]   Fax: [phone]        Physical Therapy Daily Treatment Note  Date:  2022    Patient Name:  Marquita Norris    :  1965  MRN: [id number]  Restrictions/Precautions: NONE  Diagnosis:   Diagnosis: gait isntability  Date of Injury/Surgery:  TBI  Treatment Diagnosis: Treatment Diagnosis: Balance/gait deficits    Insurance/Certification information: PT Insurance Information: [facility] medicare   Referring Physician:  Referring Practitioner: Alfredo GUTIERREZ  Next Doctor Visit:  --  Plan of care signed (Y/N):  YES, sent 22  Outcome Measure: hCandu Calhoun Balance: see folder  Visit# / total visits:   2  Pain level: 0/10   Goals:     Patient goals : improve balance  Short term goals  Time Frame for Short term goals: Pt demo I w/ HEP and symptom management reports compliance  Short term goal 1: Pt demo >5 time sit to  30 seconds with UE assist to improve functional transfers  Short term goal 2: Pt demo >500 ft in 6 minutes with no verbal cues for attention to surroundings/obstacles  Short term goal 3: Pt demo >30/56 Torrez Balance assessment to improve fall risk  Short term goal 4: Pt demo 5/5 BLE strength in all directions to improve tolerance to functional transfers     Summary of Evaluation:  Pt is 61year old male with worsening balance and gait deficits. PMH includes a TBI in 2019, seizures, and R frontal osteoma/partial calcified meningioma (being followed by Dr. Petra Noland). Pt now has difficulties with increased fall risk LOB with daily activities, and getting out of bed. He lives in a assisted living with a roommate and receives assistance with grocery shopping, cleaning, and cooking according to the patient. He does not currently drive.  Pt demo deficits this date that include high fall risk as classified by the [facility] assessment, reduced ability to follow 2 step commands, poor historian, reduced BLE strength, L inattention with gait activities, impairment in sensation in bilat feet. Pt will benefit with PT services with balance/proprioception, gait/stair training, BLE strengthening, core stability, education on safety to return to PLOF. Patient understood discussion and questions were answered. Patient understands their activity limitations and understands rational for treatment progression. Subjective:  Estefanía Perez reports his exercises have been good. He is doing his exercises everyday. States his balance has been pretty good, had 1 fall in the last couple of weeks. States he was walking too fast.       Any changes in Ambulatory Summary Sheet? None      Objective:  See eval   COVID screening questions were asked and patient attested that there had been no contact or symptoms    *prefers to go by Lena Palma    Exercises: (No more than 4 columns)   Exercise/Equipment 4/12/22 #1 4/26/22 #2 Date           WARM UP        Nu Step  3'           TABLE      *Seated March 2x10     *SLR 2x10 ea side                          STANDING      *sit to stand  2x10     Heel Raise 2x10     Marches 2x20 alt                                  PROPRIOCEPTION      Airex next     Tandem 20\" x2 ea side     Wobble Board next     Close Stance EO 30\" x2           MODALITIES                      Other Therapeutic Activities/Education:  Patient received education on their current pathology and how their condition effects them with their functional activities. Patient understood discussion and questions were answered. Patient understands their activity limitations and understands rational for treatment progression. Home Exercise Program:  HO issued, reviewed and discussed with patient. Pt agreed to comply.         Manual Treatments:  --      Modalities:  --      Communication with other providers:  grahamal sent 4/12/22      Assessment:  Lena Palma demonstrates fair tolerance to today's session and no increased pain or symptoms reported. Spent session reviewing home exercise program and noted some difficulties with recall and technique involving SLR. He fatigues more quickly on the RLE with SLR and requires a rest break during his repetitions. Noted increased R swaying with balance activities, patient able to recognize but needing verbal cueing for corrections. End of session: 0/10 pain     Pt is 61year old male with worsening balance and gait deficits. PMH includes a TBI in 2019, seizures, and R frontal osteoma/partial calcified meningioma (being followed by Dr. Emely Li). Pt now has difficulties with increased fall risk LOB with daily activities, and getting out of bed. He lives in a assisted living with a roommate and receives assistance with grocery shopping, cleaning, and cooking according to the patient. He does not currently drive. Pt demo deficits this date that include high fall risk as classified by the [facility] Balance assessment, reduced ability to follow 2 step commands, poor historian, reduced BLE strength, L inattention with gait activities, impairment in sensation in bilat feet. Pt will benefit with PT services with balance/proprioception, gait/stair training, BLE strengthening, core stability, education on safety to return to PLOF. Patient understood discussion and questions were answered. Patient understands their activity limitations and understands rational for treatment progression.        Plan for Next Session:  --      Time In / Time Out:    9233-5762      Timed Code/Total Treatment Minutes:  45'    (2) TE  (1) TA      Next Progress Note due:  10th visit      Plan of Care Interventions:  [x] Therapeutic Exercise  [] Modalities:  [x] Therapeutic Activity     [] Ultrasound  [] Estim  [] Gait Training      [] Cervical Traction [] Lumbar Traction  [x] Neuromuscular Re-education    [] Cold/hotpack [] Iontophoresis   [x] Instruction in HEP      [] Vasopneumatic   [] Dry Needling    [] Manual Therapy [] Aquatic Therapy              Electronically signed by:  Karina Plummer PT, DPT, CSCS 4/12/2022, 6:43 AM

## 2022-05-03 ENCOUNTER — HOSPITAL ENCOUNTER (OUTPATIENT)
Dept: PHYSICAL THERAPY | Age: 57
Setting detail: THERAPIES SERIES
Discharge: HOME | End: 2022-05-03
Payer: MEDICARE

## 2022-05-03 PROCEDURE — 97112 NEUROMUSCULAR REEDUCATION: CPT

## 2022-05-03 PROCEDURE — 97110 THERAPEUTIC EXERCISES: CPT

## 2022-05-03 NOTE — ED NOTES
To radiology     Denia Garcia, LAYLA  03/25/21 1511 bilateral upper extremity Active ROM was WFL (within functional limits)/bilateral  lower extremity Active ROM was WFL (within functional limits)

## 2022-05-03 NOTE — FLOWSHEET NOTE
Outpatient Physical Therapy  [place]           [x] Phone: [phone]   Fax: [phone]  Angelica rehman           [] Phone: [phone]   Fax: [phone]        Physical Therapy Daily Treatment Note  Date:  5/3/2022    Patient Name:  Anahi Valentin    :  1965  MRN: [id number]  Restrictions/Precautions: NONE  Diagnosis:   Diagnosis: gait isntability  Date of Injury/Surgery:  TBI  Treatment Diagnosis: Treatment Diagnosis: Balance/gait deficits    Insurance/Certification information: PT Insurance Information: [facility] medicare   Referring Physician:  Referring Practitioner: Enrique GUTIERREZ  Next Doctor Visit:  --  Plan of care signed (Y/N):  YES, sent 22  Outcome Measure: [employer] Balance: see folder  Visit# / total visits:   3/8  Pain level: 0/10   Goals:     Patient goals : improve balance  Short term goals  Time Frame for Short term goals: Pt demo I w/ HEP and symptom management reports compliance  Short term goal 1: Pt demo >5 time sit to  30 seconds with UE assist to improve functional transfers  Short term goal 2: Pt demo >500 ft in 6 minutes with no verbal cues for attention to surroundings/obstacles  Short term goal 3: Pt demo >30/56 Torrez Balance assessment to improve fall risk  Short term goal 4: Pt demo 5/5 BLE strength in all directions to improve tolerance to functional transfers     Summary of Evaluation:  Pt is 61year old male with worsening balance and gait deficits. PMH includes a TBI in 2019, seizures, and R frontal osteoma/partial calcified meningioma (being followed by Dr. Jethro Melton). Pt now has difficulties with increased fall risk LOB with daily activities, and getting out of bed. He lives in a assisted living with a roommate and receives assistance with grocery shopping, cleaning, and cooking according to the patient. He does not currently drive.  Pt demo deficits this date that include high fall risk as classified by the [facility] assessment, reduced ability to follow 2 step commands, poor historian, reduced BLE strength, L inattention with gait activities, impairment in sensation in bilat feet. Pt will benefit with PT services with balance/proprioception, gait/stair training, BLE strengthening, core stability, education on safety to return to PLOF. Patient understood discussion and questions were answered. Patient understands their activity limitations and understands rational for treatment progression. Subjective:  Mandy Whitt has been doing his exercises. Has not fallen since the last session. States he has lost his balance a couple of times. Any changes in Ambulatory Summary Sheet? None      Objective:  See eval   COVID screening questions were asked and patient attested that there had been no contact or symptoms    Cues for eyes forward during balance activities, requires mod/max verbal cues correcting balance  *prefers to go by Marcello Shi    Exercises: (No more than 4 columns)   Exercise/Equipment 4/26/22 #2 5/3/22 #3          WARM UP       Nu Step  3' 5'        TABLE     *Seated March 2x10 2x20 alt   *SLR 2x10 ea side 2x10 ea side                     STANDING     *sit to stand  2x10 2x10 from low chair   Heel Raise 2x10 2x10   Marches 2x20 alt 2x20 no UE assist, CGA from therapist   Obstacle Course  Cone weaving/bolster step overs fwd/lat x2 ea                       PROPRIOCEPTION     Airex next Close stance EO 30\" x3   Tandem 20\" x2 ea side Staggered stance airex 30\" x2 ea side   Wobble Board next --   Close Stance EO 30\" x2 --        MODALITIES                   Other Therapeutic Activities/Education:  Patient received education on their current pathology and how their condition effects them with their functional activities. Patient understood discussion and questions were answered. Patient understands their activity limitations and understands rational for treatment progression. Home Exercise Program:  HO issued, reviewed and discussed with patient.  Pt agreed to comply. Manual Treatments:  --      Modalities:  --      Communication with other providers:  grahamal sent 4/12/22      Assessment:  Monse Harding demonstrates fair tolerance to today's session and no increased pain or symptoms reported. He has been compliant with his HEP, but continues to have LOB at his home. Educated on slowing his pace with gait activities and to avid multi-tasking to prevent distraction. He did very well with the obstacle course without cueing provided throughout. End of session: 0/10 pain     Pt is 61year old male with worsening balance and gait deficits. PMH includes a TBI in 2019, seizures, and R frontal osteoma/partial calcified meningioma (being followed by Dr. Landon Stubbs). Pt now has difficulties with increased fall risk LOB with daily activities, and getting out of bed. He lives in a assisted living with a roommate and receives assistance with grocery shopping, cleaning, and cooking according to the patient. He does not currently drive. Pt demo deficits this date that include high fall risk as classified by the [facility] Balance assessment, reduced ability to follow 2 step commands, poor historian, reduced BLE strength, L inattention with gait activities, impairment in sensation in bilat feet. Pt will benefit with PT services with balance/proprioception, gait/stair training, BLE strengthening, core stability, education on safety to return to PLOF. Patient understood discussion and questions were answered. Patient understands their activity limitations and understands rational for treatment progression.        Plan for Next Session:  --      Time In / Time Out:    9021-9957      Timed Code/Total Treatment Minutes:  37'    (2) TE  (1) neuro      Next Progress Note due:  10th visit      Plan of Care Interventions:  [x] Therapeutic Exercise  [] Modalities:  [x] Therapeutic Activity     [] Ultrasound  [] Estim  [] Gait Training      [] Cervical Traction [] Lumbar Traction  [x] Neuromuscular Re-education [] Cold/hotpack [] Iontophoresis   [x] Instruction in HEP      [] Vasopneumatic   [] Dry Needling    [] Manual Therapy               [] Aquatic Therapy              Electronically signed by:  Flavia Burnham PT, DPT, CSCS 4/12/2022, 6:43 AM

## 2022-05-10 ENCOUNTER — HOSPITAL ENCOUNTER (OUTPATIENT)
Dept: PHYSICAL THERAPY | Age: 57
Setting detail: THERAPIES SERIES
Discharge: HOME | End: 2022-05-10
Payer: MEDICARE

## 2022-05-10 PROCEDURE — 97110 THERAPEUTIC EXERCISES: CPT

## 2022-05-10 PROCEDURE — 97112 NEUROMUSCULAR REEDUCATION: CPT

## 2022-05-10 NOTE — FLOWSHEET NOTE
Outpatient Physical Therapy  [place]           [x] Phone: [phone]   Fax: [phone]  Angelica rehman           [] Phone: [phone]   Fax: [phone]        Physical Therapy Daily Treatment Note  Date:  5/10/2022    Patient Name:  Claudean Glaser    :  1965  MRN: [id number]  Restrictions/Precautions: NONE  Diagnosis:   Diagnosis: gait isntability  Date of Injury/Surgery:  TBI  Treatment Diagnosis: Treatment Diagnosis: Balance/gait deficits    Insurance/Certification information: PT Insurance Information: [facility] medicare   Referring Physician:  Referring Practitioner: Nehemiah GUTIERREZ  Next Doctor Visit:  --  Plan of care signed (Y/N):  YES, sent 22  Outcome Measure: Cameron Johnson Balance: see folder  Visit# / total visits:     Pain level: 0/10   Goals:     Patient goals : improve balance  Short term goals  Time Frame for Short term goals: Pt demo I w/ HEP and symptom management reports compliance  Short term goal 1: Pt demo >5 time sit to  30 seconds with UE assist to improve functional transfers  Short term goal 2: Pt demo >500 ft in 6 minutes with no verbal cues for attention to surroundings/obstacles  Short term goal 3: Pt demo >30/56 Torrez Balance assessment to improve fall risk  Short term goal 4: Pt demo 5/5 BLE strength in all directions to improve tolerance to functional transfers     Summary of Evaluation:  Pt is 61year old male with worsening balance and gait deficits. PMH includes a TBI in 2019, seizures, and R frontal osteoma/partial calcified meningioma (being followed by Dr. Toyin Montana). Pt now has difficulties with increased fall risk LOB with daily activities, and getting out of bed. He lives in a assisted living with a roommate and receives assistance with grocery shopping, cleaning, and cooking according to the patient. He does not currently drive.  Pt demo deficits this date that include high fall risk as classified by the [facility] assessment, reduced ability to follow 2 step commands, poor historian, reduced BLE strength, L inattention with gait activities, impairment in sensation in bilat feet. Pt will benefit with PT services with balance/proprioception, gait/stair training, BLE strengthening, core stability, education on safety to return to PLOF. Patient understood discussion and questions were answered. Patient understands their activity limitations and understands rational for treatment progression. Subjective:  Parris Murillo reports of no recent falls or LOB. Doing well with HEP 1 time a day. Any changes in Ambulatory Summary Sheet?   None      Objective:  See eval   COVID screening questions were asked and patient attested that there had been no contact or symptoms  Requires A with balance activities  Cues for eyes forward during balance activities, requires mod/max verbal cues correcting balance  *prefers to go by Roxana Exon    Exercises: (No more than 4 columns)   Exercise/Equipment 4/26/22 #2 5/3/22 #3 5/10/22 #4           WARM UP        Nu Step  3' 5' 5' WL 3         TABLE      *Seated March 2x10 2x20 alt 2x20 alt   *SLR 2x10 ea side 2x10 ea side 2x10 ea side                        STANDING      *sit to stand  2x10 2x10 from low chair 2x10   Heel Raise 2x10 2x10 2x10   Marches 2x20 alt 2x20 no UE assist, CGA from therapist 2x20 no UE assist, CGA from therapist   Obstacle Course  Cone weaving/bolster step overs fwd/lat x2 ea Stepping over half rolls,on/off 4\"step, weaving in/out cones and walking on aeromat with CGA/Aliyah   Lateral stepping   Over half roll 15x single UE support                    PROPRIOCEPTION      Airex next Close stance EO 30\" x3 Close stance EO 30\" x3   Tandem 20\" x2 ea side Staggered stance airex 30\" x2 ea side Tandem stance 20 sec x 2 R/L fwd touching bars as needed   Wobble Board next --    Close Stance EO 30\" x2 --          MODALITIES                      Other Therapeutic Activities/Education:  Patient received education on their current pathology and how their condition effects them with their functional activities. Patient understood discussion and questions were answered. Patient understands their activity limitations and understands rational for treatment progression. Home Exercise Program:  HO issued, reviewed and discussed with patient. Pt agreed to comply. Manual Treatments:  --      Modalities:  --      Communication with other providers:  catalina sent 4/12/22      Assessment:  Russ Flynn demonstrates overall good tolerance to today's session with intermittent rest breaks due to LE fatigue. Cueing to slow his pace with gait/balance activities. Continue with therapy progressing as tolerated. End of session: 0/10 pain     Pt is 61year old male with worsening balance and gait deficits. PMH includes a TBI in 2019, seizures, and R frontal osteoma/partial calcified meningioma (being followed by Dr. Emely Li). Pt now has difficulties with increased fall risk LOB with daily activities, and getting out of bed. He lives in a assisted living with a roommate and receives assistance with grocery shopping, cleaning, and cooking according to the patient. He does not currently drive. Pt demo deficits this date that include high fall risk as classified by the [facility] Balance assessment, reduced ability to follow 2 step commands, poor historian, reduced BLE strength, L inattention with gait activities, impairment in sensation in bilat feet. Pt will benefit with PT services with balance/proprioception, gait/stair training, BLE strengthening, core stability, education on safety to return to PLOF. Patient understood discussion and questions were answered. Patient understands their activity limitations and understands rational for treatment progression.        Plan for Next Session:  --      Time In / Time Out:    8144-4458      Timed Code/Total Treatment Minutes:  43'/43'    (2) TE  (1) neuro      Next Progress Note due:  10th visit      Plan of Care Interventions:  [x] Therapeutic Exercise  [] Modalities:  [x] Therapeutic Activity     [] Ultrasound  [] Estim  [] Gait Training      [] Cervical Traction [] Lumbar Traction  [x] Neuromuscular Re-education    [] Cold/hotpack [] Iontophoresis   [x] Instruction in HEP      [] Vasopneumatic   [] Dry Needling    [] Manual Therapy               [] Aquatic Therapy              Electronically signed by:  Gerardo Johnson 5/10/2022, 12:56 PM

## 2022-05-17 ENCOUNTER — HOSPITAL ENCOUNTER (OUTPATIENT)
Dept: PHYSICAL THERAPY | Age: 57
Setting detail: THERAPIES SERIES
Discharge: HOME | End: 2022-05-17
Payer: MEDICARE

## 2022-05-17 PROCEDURE — 97530 THERAPEUTIC ACTIVITIES: CPT

## 2022-05-17 PROCEDURE — 97112 NEUROMUSCULAR REEDUCATION: CPT

## 2022-05-17 NOTE — FLOWSHEET NOTE
Outpatient Physical Therapy  [place]           [x] Phone: [phone]   Fax: [phone]  Melissa Yu           [] Phone: [phone]   Fax: [phone]        Physical Therapy Daily Treatment Note  Date:  2022    Patient Name:  Hiro Ojeda  \"Julian\"  :  1965  MRN: [id number]  Restrictions/Precautions: NONE  Diagnosis:   Diagnosis: gait isntability  Date of Injury/Surgery:  TBI  Treatment Diagnosis: Treatment Diagnosis: Balance/gait deficits    Insurance/Certification information: PT Insurance Information: [facility] medicare   Referring Physician:  Referring Practitioner: Liliana GUTIERREZ  Next Doctor Visit:  --  Plan of care signed (Y/N):  YES, sent 22  Outcome Measure: Dannie Galindo Balance: see folder  Visit# / total visits:     Pain level: 0/10   Goals:     Patient goals : improve balance  Short term goals  Time Frame for Short term goals: Pt demo I w/ HEP and symptom management reports compliance  Short term goal 1: Pt demo >5 time sit to  30 seconds with UE assist to improve functional transfers  Short term goal 2: Pt demo >500 ft in 6 minutes with no verbal cues for attention to surroundings/obstacles  Short term goal 3: Pt demo >30/56 Torrez Balance assessment to improve fall risk  Short term goal 4: Pt demo 5/5 BLE strength in all directions to improve tolerance to functional transfers     Summary of Evaluation:  Pt is 61year old male with worsening balance and gait deficits. PMH includes a TBI in 2019, seizures, and R frontal osteoma/partial calcified meningioma (being followed by Dr. Rosemary Almanzar). Pt now has difficulties with increased fall risk LOB with daily activities, and getting out of bed. He lives in a assisted living with a roommate and receives assistance with grocery shopping, cleaning, and cooking according to the patient. He does not currently drive.  Pt demo deficits this date that include high fall risk as classified by the [facility] assessment, reduced ability to follow 2 step commands, poor historian, reduced BLE strength, L inattention with gait activities, impairment in sensation in bilat feet. Pt will benefit with PT services with balance/proprioception, gait/stair training, BLE strengthening, core stability, education on safety to return to PLOF. Patient understood discussion and questions were answered. Patient understands their activity limitations and understands rational for treatment progression. Subjective:  Greg Dewitt states his balance has been really good. Reports of no recent falls. States he did well after previous session. Any changes in Ambulatory Summary Sheet? None      Objective:  See eval   COVID screening questions were asked and patient attested that there had been no contact or symptoms  Requires A with balance activities  No recent falls.     Exercises: (No more than 4 columns)   Exercise/Equipment 4/26/22 #2 5/3/22 #3 5/10/22 #4 5/17/22 #5            WARM UP         Nu Step  3' 5' 5' WL 3 5' WL 4 - 1 rest break          TABLE       *Seated March 2x10 2x20 alt 2x20 alt    *SLR 2x10 ea side 2x10 ea side 2x10 ea side                            STANDING       *sit to stand  2x10 2x10 from low chair 2x10    Heel Raise 2x10 2x10 2x10 2x10   Marches 2x20 alt 2x20 no UE assist, CGA from therapist 2x20 no UE assist, CGA from therapist 10x   aeromat 10x - 1 LOB   Obstacle Course  Cone weaving/bolster step overs fwd/lat x2 ea Stepping over half rolls,on/off 4\"step, weaving in/out cones and walking on aeromat with CGA/Aliyah    Lateral stepping   Over half roll 15x single UE support Over half roll 10x single UE support   Alt taps     4\" steps 15x touching bars as needed     Marching fwd       15ft x 3 HHA   Walking bkwd    15ft x 3 HHA   Lateral stepping    15ft x4 HHA          PROPRIOCEPTION       Airex next Close stance EO 30\" x3 Close stance EO 30\" x3 Feet together EO 20 sec x 2 with CGA/Aliyah   Tandem 20\" x2 ea side Staggered stance airex 30\" x2 ea side Tandem stance 20 sec x 2 R/L fwd touching bars as needed Tandem stance 20 sec x 2 R/L   Wobble Board next --     Close Stance EO 30\" x2 --         Tandem walking single UE support 3 laps   Standing ball toss up and bounce    10x in // bars   Bounce pass with therapist    10x in // bars          MODALITIES                         Other Therapeutic Activities/Education:  Patient received education on their current pathology and how their condition effects them with their functional activities. Patient understood discussion and questions were answered. Patient understands their activity limitations and understands rational for treatment progression. Home Exercise Program:  HO issued, reviewed and discussed with patient. Pt agreed to comply. Manual Treatments:  --      Modalities:  --      Communication with other providers:  eval sent 4/12/22      Assessment:  Isatu Trujillo demonstrates overall good tolerance to today's session with intermittent rest breaks due to fatigue. New activities tolerated well. Cueing to slow his pace with gait/balance activities and to look fwd. Continue with therapy progressing as tolerated. End of session: 0/10 pain     Pt is 61year old male with worsening balance and gait deficits. PMH includes a TBI in 2019, seizures, and R frontal osteoma/partial calcified meningioma (being followed by Dr. Toyin Montana). Pt now has difficulties with increased fall risk LOB with daily activities, and getting out of bed. He lives in a assisted living with a roommate and receives assistance with grocery shopping, cleaning, and cooking according to the patient. He does not currently drive. Pt demo deficits this date that include high fall risk as classified by the [facility] Balance assessment, reduced ability to follow 2 step commands, poor historian, reduced BLE strength, L inattention with gait activities, impairment in sensation in bilat feet.  Pt will benefit with PT services with balance/proprioception, gait/stair training, BLE strengthening, core stability, education on safety to return to PLOF. Patient understood discussion and questions were answered. Patient understands their activity limitations and understands rational for treatment progression.        Plan for Next Session:  --      Time In / Time Out:    7388-1324      Timed Code/Total Treatment Minutes:  43'/43'    (1) TA  (2) neuro      Next Progress Note due:  10th visit      Plan of Care Interventions:  [x] Therapeutic Exercise  [] Modalities:  [x] Therapeutic Activity     [] Ultrasound  [] Estim  [] Gait Training      [] Cervical Traction [] Lumbar Traction  [x] Neuromuscular Re-education    [] Cold/hotpack [] Iontophoresis   [x] Instruction in HEP      [] Vasopneumatic   [] Dry Needling    [] Manual Therapy               [] Aquatic Therapy              Electronically signed by:  Saumya Brown 5/17/2022, 12:59 PM

## 2022-05-24 ENCOUNTER — HOSPITAL ENCOUNTER (OUTPATIENT)
Dept: PHYSICAL THERAPY | Age: 57
Setting detail: THERAPIES SERIES
Discharge: HOME | End: 2022-05-24
Payer: MEDICARE

## 2022-05-24 PROCEDURE — 97112 NEUROMUSCULAR REEDUCATION: CPT

## 2022-05-24 PROCEDURE — 97530 THERAPEUTIC ACTIVITIES: CPT

## 2022-05-24 NOTE — FLOWSHEET NOTE
Outpatient Physical Therapy  [place]           [x] Phone: [phone]   Fax: [phone]  [facility] [place]           [] Phone: [phone]   Fax: [phone]        Physical Therapy Daily Treatment Note  Date:  2022    Patient Name:  Sallie Clark  \"Julian\"  :  1965  MRN: [id number]  Restrictions/Precautions: NONE  Diagnosis:   Diagnosis: gait isntability  Date of Injury/Surgery:  TBI  Treatment Diagnosis: Treatment Diagnosis: Balance/gait deficits    Insurance/Certification information: PT Insurance Information: [facility] medicare   Referring Physician:  Referring Practitioner: Cecilia GUTIERREZ  Next Doctor Visit:  --  Plan of care signed (Y/N):  YES, sent 22  Outcome Measure: Can Zamarripa Balance: see folder  Visit# / total visits:     Pain level: 0/10   Goals:      Royal Bustos has completed 6 visits since the start of therapy on 22. He demonstrates good improvements in strength, mobility, endurance, and balance. He is able to decrease his fall risk based on the Torrez balance assessment. Continues to have some minimal deficits in RLE strength likely long term due to TBI. Educated patient on continuing with his exercises at home and walking around frequently to maintain his endurance inside his home. Patient continues to be slightly unaware of his deficits and requires cueing to stay on task and knowing of his footing/placement. At this time, patient has met most of his goals and will be discharged from therapy. Summary of Evaluation:  Pt is 61year old male with worsening balance and gait deficits. PMH includes a TBI in 2019, seizures, and R frontal osteoma/partial calcified meningioma (being followed by Dr. Joshua Pike). Pt now has difficulties with increased fall risk LOB with daily activities, and getting out of bed. He lives in a assisted living with a roommate and receives assistance with grocery shopping, cleaning, and cooking according to the patient. He does not currently drive.  Pt demo deficits this date that include high fall risk as classified by the [facility] assessment, reduced ability to follow 2 step commands, poor historian, reduced BLE strength, L inattention with gait activities, impairment in sensation in bilat feet. Pt will benefit with PT services with balance/proprioception, gait/stair training, BLE strengthening, core stability, education on safety to return to PLOF. Patient understood discussion and questions were answered. Patient understands their activity limitations and understands rational for treatment progression. Subjective:  Patient reports he has made more progress in therapy and is doing more than he was doing before. He continues to complete his exercises at home 1x per day. Any changes in Ambulatory Summary Sheet?   None      Objective:  See eval   COVID screening questions were asked and patient attested that there had been no contact or symptoms    SEE DN    Exercises: (No more than 4 columns)   Exercise/Equipment 5/10/22 #4 5/17/22 #5 5/24/22 #6           WARM UP        Nu Step  5' WL 3 5' WL 4 - 1 rest break 5' LV3         TABLE      *Seated March 2x20 alt     *SLR 2x10 ea side                          STANDING      *sit to stand  2x10     Heel Raise 2x10 2x10    Marches 2x20 no UE assist, CGA from therapist 10x   aeromat 10x - 1 LOB    Obstacle Course Stepping over half rolls,on/off 4\"step, weaving in/out cones and walking on aeromat with CGA/Aliyah     Lateral stepping Over half roll 15x single UE support Over half roll 10x single UE support    Alt taps   4\" steps 15x touching bars as needed      Marching fwd     15ft x 3 HHA    Walking bkwd  15ft x 3 HHA    Lateral stepping  15ft x4 HHA          PROPRIOCEPTION      Airex Close stance EO 30\" x3 Feet together EO 20 sec x 2 with CGA/Aliyah    Tandem Tandem stance 20 sec x 2 R/L fwd touching bars as needed Tandem stance 20 sec x 2 R/L    Wobble Board      Close Stance        Tandem walking single UE support 3 laps Standing ball toss up and bounce  10x in // bars    Bounce pass with therapist  10x in // bars          MODALITIES                      Other Therapeutic Activities/Education:  Patient received education on their current pathology and how their condition effects them with their functional activities. Patient understood discussion and questions were answered. Patient understands their activity limitations and understands rational for treatment progression. Home Exercise Program:  HO issued, reviewed and discussed with patient. Pt agreed to comply. Manual Treatments:  --      Modalities:  --      Communication with other providers:  grahamal sent 4/12/22      Assessment:  Roxana Barnhart has completed 6 visits since the start of therapy on 4/12/22. He demonstrates good improvements in strength, mobility, endurance, and balance. He is able to decrease his fall risk based on the Torrez balance assessment. Continues to have some minimal deficits in RLE strength likely long term due to TBI. Educated patient on continuing with his exercises at home and walking around frequently to maintain his endurance inside his home. Patient continues to be slightly unaware of his deficits and requires cueing to stay on task and knowing of his footing/placement. At this time, patient has met most of his goals and will be discharged from therapy. End of session: 0/10 pain     Pt is 61year old male with worsening balance and gait deficits. PMH includes a TBI in 2019, seizures, and R frontal osteoma/partial calcified meningioma (being followed by Dr. Petra Noland). Pt now has difficulties with increased fall risk LOB with daily activities, and getting out of bed. He lives in a assisted living with a roommate and receives assistance with grocery shopping, cleaning, and cooking according to the patient. He does not currently drive.  Pt demo deficits this date that include high fall risk as classified by the [facility] assessment, reduced ability to follow 2 step commands, poor historian, reduced BLE strength, L inattention with gait activities, impairment in sensation in bilat feet. Pt will benefit with PT services with balance/proprioception, gait/stair training, BLE strengthening, core stability, education on safety to return to PLOF. Patient understood discussion and questions were answered. Patient understands their activity limitations and understands rational for treatment progression.        Plan for Next Session:  --      Time In / Time Out:    4788-0859      Timed Code/Total Treatment Minutes:  36'   (1) TA  (2) neuro      Next Progress Note due:  10th visit      Plan of Care Interventions:  [x] Therapeutic Exercise  [] Modalities:  [x] Therapeutic Activity     [] Ultrasound  [] Estim  [] Gait Training      [] Cervical Traction [] Lumbar Traction  [x] Neuromuscular Re-education    [] Cold/hotpack [] Iontophoresis   [x] Instruction in HEP      [] Vasopneumatic   [] Dry Needling    [] Manual Therapy               [] Aquatic Therapy              Electronically signed by:  Redd Guerra, PT, DPT, CSCS 5/24/2022, 11:19 AM

## 2022-05-24 NOTE — DISCHARGE SUMMARY
Outpatient Physical Therapy           [place]           [] Phone: [phone]   Fax: [phone]  [place]           [] Phone: [phone]   Fax: [phone]      To: BRENDA Menendez     From: Conrado Green PT, PT     Patient: Dell Shallow                    : 1965  Diagnosis:  Unsteadiness on feet [R26.81]        Treatment Diagnosis:       Date: 2022  []  Progress Note                [x]  Discharge Note    Evaluation Date:  22   Total Visits to date:   6 Cancels/No-shows to date:  1    Subjective:  Patient reports he has made more progress in therapy and is doing more than he was doing before. He continues to complete his exercises at home 1x per day. Plan of Care/Treatment to date:  [x] Therapeutic Exercise    [] Modalities:  [x] Therapeutic Activity     [] Ultrasound  [] Electrical Stimulation  [x] Gait Training      [] Cervical Traction   [] Lumbar Traction  [x] Neuromuscular Re-education  [] Cold/hotpack [] Iontophoresis  [x] Instruction in HEP      Other:  [x] Manual Therapy       []  Vasopneumatic  [] Aquatic Therapy       []   Dry Needle Therapy                      Objective/Significant Findings At Last Visit/Comments:      Observation: No AD, noted L inattention and frequent LOB with gait; pt would frequently get distracted     PROM RLE (degrees)  RLE PROM: WNL  AROM RLE (degrees)  RLE AROM: WNL  PROM LLE (degrees)  LLE PROM: WNL  AROM LLE (degrees)  LLE AROM : WNL     Strength RLE: 4+/5 hip flexion, 5/5 knee flexion, 4+/5 knee ext  Strength LLE: 5/5 all directions    30 sec STS: 10 times with UE assist, no increased pain    Torrez Balance: 43/56  6MWT: 1,249 ft with CGA from therapist on gait belt    Overall Sensation Status: WNL      Assessment:   Dina Yu has completed 6 visits since the start of therapy on 22. He demonstrates good improvements in strength, mobility, endurance, and balance. He is able to decrease his fall risk based on the Torrez balance assessment. Continues to have some minimal deficits in RLE strength likely long term due to TBI. Educated patient on continuing with his exercises at home and walking around frequently to maintain his endurance inside his home. Patient continues to be slightly unaware of his deficits and requires cueing to stay on task and knowing of his footing/placement. At this time, patient has met most of his goals and will be discharged from therapy. Goal Status:  [x] Achieved [] Partially Achieved  [] Not Achieved     Patient goals : improve balance  Short term goals  Time Frame for Short term goals: Pt demo I w/ HEP and symptom management reports compliance  Short term goal 1: Pt demo >5 time sit to  30 seconds with UE assist to improve functional transfers MET  Short term goal 2: Pt demo >500 ft in 6 minutes with no verbal cues for attention to surroundings/obstacles MET  Short term goal 3: Pt demo >30/56 Torrez Balance assessment to improve fall risk MET  Short term goal 4: Pt demo 5/5 BLE strength in all directions to improve tolerance to functional transfers Improved       Frequency/Duration:  # Days per week: [x] 1 day # Weeks: [] 1 week [] 4 weeks [x] 8 weeks     [] 2 days   [] 2 weeks [] 5 weeks [] 10 weeks     [] 3 days   [] 3 weeks [] 6 weeks [] 12 weeks       Rehab Potential: [] Excellent [x] Good [] Fair  [] Poor         Patient Status: [] Continue per initial plan of Care     [x] Patient now discharged     [] Additional visits requested, Please re-certify for additional visits: If we are requesting more visits, we fully anticipate the patient's condition is expected to improve within the treatment timeframe we are requesting. Electronically signed by:  Caleb Rondon, PT, DPT, CSCS 5/24/2022, 11:23 AM    If you have any questions or concerns, please don't hesitate to call.   Thank you for your referral.    Physician Signature:______________________ Date:______ Time: ________  By signing above, therapists plan is approved by physician

## 2022-06-13 ENCOUNTER — TELEPHONE (OUTPATIENT)
Dept: NEUROLOGY | Age: 57
End: 2022-06-13

## 2022-06-14 ENCOUNTER — TELEPHONE (OUTPATIENT)
Dept: NEUROLOGY | Age: 57
End: 2022-06-14

## 2022-06-14 NOTE — TELEPHONE ENCOUNTER
Received call from [facility], caregiver with [employer] [facility] stating she has some orders for labs from pt that were previously ordered. Informed there were additional labs and faxed orders per Antione Cohn 12/16/21 to #[phone].

## 2022-06-16 ENCOUNTER — HOSPITAL ENCOUNTER (OUTPATIENT)
Age: 57
Setting detail: SPECIMEN
Discharge: HOME | End: 2022-06-16
Payer: MEDICARE

## 2022-06-16 LAB
ALBUMIN ELP: 3.6 GM/DL (ref 3.2–5.6)
ALBUMIN SERPL-MCNC: NORMAL G/DL
ALPHA-1-GLOBULIN: 0.3 GM/DL (ref 0.1–0.4)
ALPHA-1-GLOBULIN: NORMAL
ALPHA-2-GLOBULIN: 0.7 GM/DL (ref 0.4–1.2)
ALPHA-2-GLOBULIN: NORMAL
BETA GLOBULIN: 1 GM/DL (ref 0.5–1.3)
BETA GLOBULIN: NORMAL
FOLATE: >20 NG/ML (ref 3.1–17.5)
GAMMA GLOBULIN: 1 GM/DL (ref 0.5–1.6)
GAMMA: NORMAL
IMMUNOFIXATION REFLEX: NORMAL
PROTEIN ELECTROPHORESIS, SERUM: NORMAL
SPE/IFE INTERPRETATION: NORMAL
SPEP INTERPRETATION: NORMAL
TOTAL PROTEIN: 6.5 GM/DL (ref 6.4–8.2)
TOTAL PROTEIN: NORMAL
VITAMIN B-12: 483.3 PG/ML (ref 211–911)
VITAMIN D 25-HYDROXY: 28.92 NG/ML

## 2022-06-16 PROCEDURE — 82746 ASSAY OF FOLIC ACID SERUM: CPT

## 2022-06-16 PROCEDURE — 36415 COLL VENOUS BLD VENIPUNCTURE: CPT

## 2022-06-16 PROCEDURE — 84155 ASSAY OF PROTEIN SERUM: CPT

## 2022-06-16 PROCEDURE — 82607 VITAMIN B-12: CPT

## 2022-06-16 PROCEDURE — 82306 VITAMIN D 25 HYDROXY: CPT

## 2022-06-16 PROCEDURE — 84165 PROTEIN E-PHORESIS SERUM: CPT

## 2022-06-20 ENCOUNTER — HOSPITAL ENCOUNTER (OUTPATIENT)
Age: 57
Discharge: HOME | End: 2022-06-20
Payer: MEDICARE

## 2022-06-20 DIAGNOSIS — G60.9 IDIOPATHIC PERIPHERAL NEUROPATHY: ICD-10-CM

## 2022-06-20 LAB
ESTIMATED AVERAGE GLUCOSE: 108 MG/DL
HBA1C MFR BLD: 5.4 % (ref 4.2–6.3)
REASON FOR REJECTION: NORMAL
REJECTED TEST: NORMAL

## 2022-06-20 PROCEDURE — 36415 COLL VENOUS BLD VENIPUNCTURE: CPT

## 2022-06-20 PROCEDURE — 83036 HEMOGLOBIN GLYCOSYLATED A1C: CPT

## 2022-06-20 PROCEDURE — 80053 COMPREHEN METABOLIC PANEL: CPT

## 2022-06-22 ENCOUNTER — OFFICE VISIT (OUTPATIENT)
Dept: NEUROLOGY | Age: 57
End: 2022-06-22
Payer: MEDICARE

## 2022-06-22 VITALS
DIASTOLIC BLOOD PRESSURE: 68 MMHG | WEIGHT: 218.4 LBS | OXYGEN SATURATION: 95 % | SYSTOLIC BLOOD PRESSURE: 110 MMHG | HEART RATE: 91 BPM | BODY MASS INDEX: 30.57 KG/M2 | HEIGHT: 71 IN

## 2022-06-22 DIAGNOSIS — G40.909 SEIZURE DISORDER (HCC): Primary | ICD-10-CM

## 2022-06-22 DIAGNOSIS — S06.9X9D TRAUMATIC BRAIN INJURY WITH LOSS OF CONSCIOUSNESS, SUBSEQUENT ENCOUNTER: ICD-10-CM

## 2022-06-22 DIAGNOSIS — R26.81 GAIT INSTABILITY: ICD-10-CM

## 2022-06-22 PROCEDURE — 3017F COLORECTAL CA SCREEN DOC REV: CPT | Performed by: NURSE PRACTITIONER

## 2022-06-22 PROCEDURE — G8417 CALC BMI ABV UP PARAM F/U: HCPCS | Performed by: NURSE PRACTITIONER

## 2022-06-22 PROCEDURE — 4004F PT TOBACCO SCREEN RCVD TLK: CPT | Performed by: NURSE PRACTITIONER

## 2022-06-22 PROCEDURE — G8427 DOCREV CUR MEDS BY ELIG CLIN: HCPCS | Performed by: NURSE PRACTITIONER

## 2022-06-22 PROCEDURE — 99214 OFFICE O/P EST MOD 30 MIN: CPT | Performed by: NURSE PRACTITIONER

## 2022-06-22 NOTE — PROGRESS NOTES
6/22/22    Antonio Young  1965    Chief Complaint   Patient presents with    Head Injury     pt presents for f/u on TBI, pt states things are going good, pt's caregiver states no improvement    Gait Problem     pt still presents with some balance issues    Seizures     pt's caregiver still denies any breakthrough seizures       History of Present Illness  Carlos Escalante is a 61 y.o. male presenting today for follow-up of: He is accompanied by caregiver, Yennifer Pereira. History of TBI and subsequent seizure disorder. Patient remains on Dilantin 400 mg daily with lamotrigine 100 mg twice daily. Patient has had work-up for peripheral neuropathy, EMG findings were essentially normal, labs for reversible causes had not been completed from prior visit however most were completed with PCP and were essentially normal.  Patient was provided a prescription for vitamin D and B12 levels to be drawn, levels were WNL. On last visit patient continued to endorse gait disturbance, feeling off balance. Patient was referred to physical therapy for PT and balance on last visit. Patient denied having any paresthesias that would require pharmacological treatment. Today Simba and caregiver report that he continues to have balance difficulties, mother reports that he did do PT/balance therapy but did not do his home exercises but Carlos Escalante reports that he does. Physical therapy note does state that Carlos Escalante would benefit from more PT/balance therapy.       Current Outpatient Medications   Medication Sig Dispense Refill    fenofibrate (TRIGLIDE) 160 MG tablet Take 160 mg by mouth daily      melatonin 3 MG TABS tablet Take 3 mg by mouth daily      finasteride (PROSCAR) 5 MG tablet TAKE 1 TABLET BY MOUTH DAILY      atorvastatin (LIPITOR) 10 MG tablet Take 10 mg by mouth daily      XARELTO 20 MG TABS tablet TAKE 1 TABLET BY MOUTH DAILY      risperiDONE (RISPERDAL) 1 MG tablet Take 1 mg by mouth nightly      phenytoin (DILANTIN) 100 MG ER capsule Take 400 mg by mouth every morning      omeprazole (PRILOSEC) 20 MG delayed release capsule Take 20 mg by mouth daily      fluvoxaMINE (LUVOX) 100 MG tablet Take 200 mg by mouth nightly      Multiple Vitamins-Minerals (THERAPEUTIC MULTIVITAMIN-MINERALS) tablet Take 1 tablet by mouth daily      busPIRone (BUSPAR) 30 MG tablet Take 30 mg by mouth 2 times daily       lamoTRIgine (LAMICTAL) 100 MG tablet Take 100 mg by mouth 2 times daily       clonazePAM (KLONOPIN) 0.5 MG tablet Take 0.5 mg by mouth three times daily. No current facility-administered medications for this visit.        Physical Exam:    Constitutional  Weight: obese  Lungs: No evidence of respiratory distress     Mental Status              Orientation: Oriented to self, place              Mood/affectappropriate mood and appropriate affect              Memory/Other: Fund of knowledge intact, attention/concentration intact, he was able to list the months backwards starting with December but did tell me the wrong month and year initially and timeline of history provided was difficult to follow at times  Language  Language: (normal) language, no dysarthria, (normal) articulation and no dysphasia/aphasia  Cranial Nerves              Eyes: pupils normal size and reactive to light and visual fields appear full              CN III, IV, VI : extraocular muscle strength normal, normal pursuit, no nystagmus and no ptosis              Facial Motor: normal facial motor              CN XII: tongue protrudes midline  Motor/Coordination Exam              Power: no arm drift, normal tone and symmetric b/l  strength normal motor strength              Coordination: normal finger-to-nose, forearm rotation intact and rapid alternating movement normal  Sensory Exam No Bradykinesia, No Dyskindesia, No Tremor and No myoclonus light touch intact, diminished temperature, vibratory and pinprick sensation in bilateral lower extremities that is length dependent                Gait and Stance              Gait/Posture: station normal and Ambulates independently, unsteady casual gait, does tend to lean to the right and have to look down at the ground while walking      /68 (Site: Left Upper Arm, Position: Sitting, Cuff Size: Large Adult)   Pulse 91   Ht 5' 11\" (1.803 m)   Wt 218 lb 6.4 oz (99.1 kg)   SpO2 95%   BMI 30.46 kg/m²     Assessment and Plan     Diagnosis Orders   1. Seizure disorder ([facility] [address]     2. Gait instability  [facility]   3. Traumatic brain injury with loss of consciousness, subsequent encounter     Abdomen remained seizure-free, I will not make any changes to current medication regimen at this time. I will order more PT and balance therapy to try to help improve his balance. I will plan on following up with Jennifer Olea again in 6 months, sooner if the need arises. Seizure precautions discussed including no driving, tub baths, climbing ladders or operating dangerous equipment until event/seizure free for 3 months. Patient will notify in the event of any breakthrough seizure or seizure-like activity including staring spells, automatisms such as lip smacking, eye blinking, recurring episodes of déjà vu, awakening having bitten tongue during sleep. Medications prescribed for the patient were discussed in detail. This included a discussion of the potential risks versus potential benefits of the medications. The patient was given time to ask questions and these were answered to the best of my ability. The patient appeared to understand the information provided. Return in about 6 months (around 12/22/2022).     ABHISHEK Beard - BAO

## 2022-06-23 ENCOUNTER — TELEPHONE (OUTPATIENT)
Dept: NEUROLOGY | Age: 57
End: 2022-06-23

## 2022-06-23 NOTE — TELEPHONE ENCOUNTER
Received call from Sofi Munoz, home supervisor with [employer] [facility] stating PT was ordered at ov yesterday but pt just completed. Informed that per ov note, Katerina Salgado had recommended additional PT for balance therapy. Marylene Bandy also stated Dr. Jenifer Cogan had thought maybe some balance issues were related to mental health med and pt is f/u with mental health on 6/28. Advised to begin therapy as prescribed and to update if improvement with medication changes per mental health.

## 2022-07-13 ENCOUNTER — HOSPITAL ENCOUNTER (OUTPATIENT)
Dept: PHYSICAL THERAPY | Age: 57
Setting detail: THERAPIES SERIES
Discharge: HOME | End: 2022-07-13
Payer: MEDICARE

## 2022-07-13 ENCOUNTER — TELEPHONE (OUTPATIENT)
Dept: NEUROLOGY | Age: 57
End: 2022-07-13

## 2022-07-13 PROCEDURE — 97161 PT EVAL LOW COMPLEX 20 MIN: CPT

## 2022-07-13 PROCEDURE — 97110 THERAPEUTIC EXERCISES: CPT

## 2022-07-13 NOTE — PLAN OF CARE
Outpatient Physical Therapy           [place]           [] Phone: [phone]   Fax: [phone]  [place]           [] Phone: [phone]   Fax: [phone]     To: ABHISHEK Isaacs NP     From: Gil Keen, PT, DPT, OCS     Patient: Shreya Lares       : 1965  Diagnosis: Unsteadiness on feet [R26.81]    Treatment Diagnosis: Balance/gait deficits, min decreased LE weakness  Date: 2022    Physical Therapy Certification/Re-Certification Form  Dear Jessica Fontana NP  The following patient has been evaluated for physical therapy services and for therapy to continue, insurance requires physician review of the treatment plan initially and every 90 days. Please review the attached evaluation and/or summary of the patient's plan of care, and verify that you agree therapy should continue by signing the attached document and sending it back to our office. Assessment:      Pt is 61year old male with gradual onset of balance instability. Pt now has difficulties completing general mobility and transfers with instability and near falls. Pt demo deficits this date that include reduced balance stability, closed chain strength, delayed recovery reactions. Pt will benefit with PT services with progression of strength/ROM, neuro re-ed  to return to OF. Pt prior to onset of current condition had lower instability with able to complete full ADLs. Patient received education on their current pathology and how their condition effects them with their functional activities. Patient understood discussion and questions were answered. Patient understands their activity limitations and understands rational for treatment progression.     Plan of Care/Treatment to date:  [x] Therapeutic Exercise  [] Modalities:  [x] Therapeutic Activity     [] Ultrasound  [] Electrical Stimulation  [x] Gait Training      [] Cervical Traction [] Lumbar Traction  [x] Neuromuscular Re-education    [] Cold/hotpack [] Iontophoresis   [x] Instruction in HEP      [] Vasopneumatic    [] Dry Needling  [] Manual Therapy               [] Aquatic Therapy       Other:          Frequency/Duration:  # Days per week: [x] 1 day # Weeks: [] 1 week [] 5 weeks     [] 2 days   [] 2 weeks [] 6 weeks     [] 3 days   [] 3 weeks [] 7 weeks     [] 4 days   [] 4 weeks [x] 8 weeks         [] 9 weeks [] 10 weeks         [] 11 weeks [] 12 weeks    Rehab Potential/Progress: [] Excellent [x] Good [] Fair  [] Poor     Goals:    Patient goals: improve balance  Short term goals  Time Frame for Short term goals:   8 weeks 9/13/22  Pt demo I w/ HEP and symptom management  Pt demo 5x time sit to  30 seconds with UE assist to improve functional transfers  Pt demo >850 ft in 6 minutes with no verbal cues for attention to surroundings/obstacles  Pt will demo >12 sec with tandem stance to demo improved strategies for balance recovery. Pt demo 5/5 BLE strength in all directions to improve tolerance to functional transfers  Pt will demo >15% improvement ABC confidence balance scale                  Electronically signed by:  Cielo Henao PT, DPT ,OCS  7/13/2022, 12:42 PM    7/13/2022 12:42 PM         If you have any questions or concerns, please don't hesitate to call.   Thank you for your referral.      Physician Signature:________________________________Date:_________ TIME: _____  By signing above, therapists plan is approved by physician

## 2022-07-13 NOTE — FLOWSHEET NOTE
Outpatient Physical Therapy  [place]           [x] Phone: [phone]   Fax: [phone]  Angelica rehman           [x] Phone: [phone]   Fax: [phone]        Physical Therapy Daily Treatment Note  Date:  2022    Patient Name:  Shadia Steele    :  1965  MRN: [id number]  Restrictions/Precautions: No data recorded      Diagnosis:   Unsteadiness on feet [R26.81]    Date of Injury/Surgery:   Treatment Diagnosis:  Balance/gait deficits, min decreased LE weakness  Insurance/Certification information:   [facility] Medicare   Referring Physician:  ABHISHEK Dyer NP     PCP: Sara Blum MD  Next Doctor Visit:    Plan of care signed (Y/N):  N, sent 22  Outcome Measure: ABC: 60%  Visit# / total visits:   per POC  Pain level: 0/10   Goals:     Patient goals: improve balance  Short term goals  Time Frame for Short term goals:   Pt demo I w/ HEP and symptom management  Pt demo 5x time sit to  30 seconds with UE assist to improve functional transfers  Pt demo >850 ft in 6 minutes with no verbal cues for attention to surroundings/obstacles  Pt will demo >12 sec with tandem stance to demo improved strategies for balance recovery. Pt demo 5/5 BLE strength in all directions to improve tolerance to functional transfers  Pt will demo >15% improvement ABC confidence balance scale      Summary of Evaluation:    Pt is 61year old male with gradual onset of balance instability. Pt now has difficulties completing general mobility and transfers with instability and near falls. Pt demo deficits this date that include reduced balance stability, closed chain strength, delayed recovery reactions. Pt will benefit with PT services with progression of strength/ROM, neuro re-ed  to return to OF. Pt prior to onset of current condition had lower instability with able to complete full ADLs.  Patient received education on their current pathology and how their condition effects them with their functional activities. Patient understood discussion and questions were answered. Patient understands their activity limitations and understands rational for treatment progression. Subjective:  See catalina         Any changes in Ambulatory Summary Sheet? None        Objective:  See eval   COVID screening questions were asked and patient attested that there had been no contact or symptoms        Exercises: (No more than 4 columns)   Exercise/Equipment 7/13/22  #1 Date Date           WARM UP         Nu step  Lv5  5'            TABLE      SLR                                 STANDING      *High marches 20x2     *Heel toe raises  10x2     *STS with pillow on chair  10x     Shuttle leg press                              PROPRIOCEPTION      *Tandem stance  20\"x3     airex perturbations                         MODALITIES                      Other Therapeutic Activities/Education:  Patient received education on their current pathology and how their condition effects them with their functional activities. Patient understood discussion and questions were answered. Patient understands their activity limitations and understands rational for treatment progression. Home Exercise Program:  HO issued, reviewed and discussed with patient. Pt agreed to comply. Manual Treatments:  --      Modalities:  --      Communication with other providers:  POC sent 7/13/22       Assessment:  (Response towards treatment session) (Pain Rating)     Pt is 61year old male with gradual onset of balance instability. Pt now has difficulties completing general mobility and transfers with instability and near falls. Pt demo deficits this date that include reduced balance stability, closed chain strength, delayed recovery reactions. Pt will benefit with PT services with progression of strength/ROM, neuro re-ed  to return to PLOF. Pt prior to onset of current condition had lower instability with able to complete full ADLs.  Patient received education on their current pathology and how their condition effects them with their functional activities. Patient understood discussion and questions were answered. Patient understands their activity limitations and understands rational for treatment progression. Plan for Next Session: balance stability on various surfaces, closed chain strengthening as able.        Time In / Time Out:    1100/1200        Timed Code/Total Treatment Minutes:   15'/60'       15' TE, 1 PT jasmyne       Next Progress Note due:  Jasmyne 7/13/22   Visit 10       Plan of Care Interventions:  [x] Therapeutic Exercise  [x] Modalities:  [x] Therapeutic Activity     [] Ultrasound  [] Estim  [] Gait Training      [] Cervical Traction [] Lumbar Traction  [x] Neuromuscular Re-education    [x] Cold/hotpack [] Iontophoresis   [x] Instruction in HEP      [] Vasopneumatic   [] Dry Needling    [x] Manual Therapy               [] Aquatic Therapy              Electronically signed by:  Ubaldo Baxter, PT, DPT, OCS  7/13/2022, 8:06 AM    7/13/2022 8:06 AM

## 2022-07-13 NOTE — PROGRESS NOTES
Physical Therapy: Initial Evaluation    Patient: Gunner Cantu (30 y.o. male)   Examination Date:   Plan of Care Certification Period: 2022 to        :  1965 ;    Confirmed: Yes MRN: [id number]  CSN: [id number]   Insurance: Payor: Wiley Aguayo / Plan: [employer] [employer] / Product Type: *No Product type* /   Insurance ID: [id number] - (Medicare Managed) Secondary Insurance (if applicable): MEDICAID [place]   Referring Physician: ABHISHEK Yañez NP     PCP: Farzad Gomez MD Visits to Date/Visits Approved:   /      No Show/Cancelled Appts:   /       Medical Diagnosis: Unsteadiness on feet [R26.81]    Treatment Diagnosis: Balance/gait deficits, min decreased LE weakness     PERTINENT MEDICAL HISTORY   Patient Assessed for Rehabilitation Services: Yes       Medical History: Chart Reviewed: Yes   Past Medical History:   Diagnosis Date    [facility]     GERD (gastroesophageal reflux disease)     Hyperlipidemia     Hypertension     [facility]ures [facility]      Surgical History: History reviewed. No pertinent surgical history.     Medications:   Current Outpatient Medications:     fenofibrate (TRIGLIDE) 160 MG tablet, Take 160 mg by mouth daily, Disp: , Rfl:     melatonin 3 MG TABS tablet, Take 3 mg by mouth daily, Disp: , Rfl:     finasteride (PROSCAR) 5 MG tablet, TAKE 1 TABLET BY MOUTH DAILY, Disp: , Rfl:     atorvastatin (LIPITOR) 10 MG tablet, Take 10 mg by mouth daily, Disp: , Rfl:     XARELTO 20 MG TABS tablet, TAKE 1 TABLET BY MOUTH DAILY, Disp: , Rfl:     risperiDONE (RISPERDAL) 1 MG tablet, Take 1 mg by mouth nightly, Disp: , Rfl:     phenytoin (DILANTIN) 100 MG ER capsule, Take 400 mg by mouth every morning, Disp: , Rfl:     omeprazole (PRILOSEC) 20 MG delayed release capsule, Take 20 mg by mouth daily, Disp: , Rfl:     fluvoxaMINE (LUVOX) 100 MG tablet, Take 200 mg by mouth nightly, Disp: , Rfl:     Multiple Vitamins-Minerals (THERAPEUTIC MULTIVITAMIN-MINERALS) tablet, Take 1 tablet by mouth daily, Disp: , Rfl:     busPIRone (BUSPAR) 30 MG tablet, Take 30 mg by mouth 2 times daily , Disp: , Rfl:     lamoTRIgine (LAMICTAL) 100 MG tablet, Take 100 mg by mouth 2 times daily , Disp: , Rfl:     clonazePAM (KLONOPIN) 0.5 MG tablet, Take 0.5 mg by mouth three times daily. , Disp: , Rfl:   Allergies: Amoxicillin and Bactrim [sulfamethoxazole-trimethoprim]      SUBJECTIVE EXAMINATION      ,           Subjective History:    Subjective: Daily near falls with having to use furniture to catch himself. Dizziness daily but not frequently. Goes to [employer] and has outings to go out in the public to eat. Has 24/7 aide assistance. Did feel therapy was helpful earlier this year. Does participate in daily activities. Denies getting SOB with activity and not a limitation. Does feel his legs get occasionally weak and shuffle his feet to initiate movements. Return follow up in 3 months.   Additional Pertinent Hx (if applicable):            Learning/Language: Learning  Does the patient/guardian have any barriers to learning?: No barriers  Will there be a co-learner?: No  What is the preferred language of the patient/guardian?: English  Is an  required?: No  How does the patient/guardian prefer to learn new concepts?: Listening,Demonstration     Pain Screening   Pain Screening  Patient Currently in Pain: No    Functional Status    Dominant Hand: : Left    Social History:       Occupation/Interests:  Occupation: On disability    Prior Level of Function:    Independent        Current Level of Function:  Independent with cues on occasion      ADL Assistance: Independent  Homemaking Assistance: Independent  Ambulation Assistance: Independent  Transfer Assistance: Independent  Active : No  Patient's  Info: driving service    OBJECTIVE EXAMINATION   Restrictions:             Review of Systems:  Vision: Within Functional Limits  Hearing: Within functional limits  Overall Orientation Status: Within Normal Limits  Follows Commands: Within Functional Limits    Observations:  General Observations  Description: rounded shoulders in standing and sitting. Ambulation/Gait (if applicable):   occasional reduced step length, rounded shoulders with forward head posture. Balance Screen:  Balance  Tandem Stance R Le  Tandem Stance L Leg: 3  Single Stance R Le  Single Stance L Le  Foam Surface  Eyes Open: 30 seconds  Sway: Minimal  Eyes Closed: 20 seconds  Sway: Moderate  Romberg/Narrow Base of Support  Eyes Open: 30 seconds  Sway: Minimal  Strategy: Hip  Eyes Closed: 5 seconds  Sway: Max    Neuro Screen:  WNL  Left AROM  Right AROM       WNL     WNL        Left PROM  Right PROM       WNL     WNL        Left Strength  Right Strength         Strength LLE  L Hip Flexion: 4+/5  L Hip Extension: 5/5  L Hip ABduction: 5/5  L Hip ADduction: 5/5  L Hip Internal Rotation: 5/5  L Hip External Rotation: 5/5  L Knee Flexion: 5/5  L Knee Extension: 5/5    Strength RLE  R Hip Flexion: 4+/5  R Hip Extension: 5/5  R Hip ABduction: 5/5  R Hip ADduction: 5/5  R Hip Internal Rotation: 4+/5  R Hip External Rotation: 4+/5  R Knee Flexion: 4/5  R Knee Extension: 4/5       Trunk Strength      Good in all directions without pain. Additional Finding(s) (if applicable):       5x sit to stand:      4 reps completed in 47.29 sec with fatigue and unable to finish. TUG: 10.91 sec              6MWT: 864ft with one standing rest break. ABC Scale:    60% confidence   ASSESSMENT     Impression:   Pt is 61year old male with gradual onset of balance instability. Pt now has difficulties completing general mobility and transfers with instability and near falls. Pt demo deficits this date that include reduced balance stability, closed chain strength, delayed recovery reactions. Pt will benefit with PT services with progression of strength/ROM, neuro re-ed  to return to PLOF. Pt prior to onset of current condition had lower instability with able to complete full ADLs. Patient received education on their current pathology and how their condition effects them with their functional activities. Patient understood discussion and questions were answered. Patient understands their activity limitations and understands rational for treatment progression. Body Structures, Functions, Activity Limitations Requiring Skilled Therapeutic Intervention: Decreased functional mobility ,Decreased ADL status,Decreased posture,Decreased ROM,Decreased strength,Decreased endurance,Decreased balance    Statement of Medical Necessity: Physical Therapy is both indicated and medically necessary as outlined in the POC to increase the likelihood of meeting the functionally related goals stated below. Patient's Activity Tolerance: Patient tolerated evaluation without incident      Patient's rehabilitation potential/prognosis is considered to be: Good    Factors which may impact rehabilitation potential include: None  Measures taken to address barrier(s): N/A     GOALS   Patient Goal(s): improve balance  Short Term Goals Completed by 8 weeks 9/13/22 Goal Status   Pt demo 5x time sit to  30 seconds with UE assist to improve functional transfers     Pt demo >850 ft in 6 minutes with no verbal cues for attention to surroundings/obstacles     Pt will demo >12 sec with tandem stance to demo improved strategies for balance recovery.      Pt demo 5/5 BLE strength in all directions to improve tolerance to functional transfers     Pt will demo >15% improvement ABC confidence balance scale       Pt demo I w/ HEP and symptom management                                 Long Term Goals Completed by   Goal Status                                                                    TREATMENT PLAN       Requires PT Follow-Up: Yes  Specific Instructions for Next Treatment: balance stability on various surfaces, closed chain strengthening as able. Pt. actively involved in establishing Plan of Care and Goals: Yes  Patient/ Caregiver education and instruction: Jorje Locke of Care,Disease Specific Education,Home Exercise Program,Pressure Relief             Treatment may include any combination of the following: Strengthening,ROM,Neuromuscular re-education,Endurance training,Modalities,Therapeutic activities,Home exercise program,Gait training,Balance training     Frequency / Duration:  Patient to be seen 1x for 8 weeks weeks      Eval Complexity: Overall Evaluation : Low  Decision Making: Low Complexity  History: Personal Factors and/or Comorbidities Impacting POC: Low  Examination of body system(s) including body structures and functions, activity limitations, and/or participation restrictions: Low  Clinical Presentation: Low          Therapist Signature: Adria Cantu PT, DPT, OCS     Date: 7/13/2022 7/13/2022 98:59 PM     I certify that the above Therapy Services are being furnished while the patient is under my care. I agree with the treatment plan and certify that this therapy is necessary. [address] [place] [address] Signature:  ___________________________   Date:_______                                                                   ABHISHEK Ohara NP        Physician Comments: _______________________________________________    Please sign and return to [address] [place] [address]. Please fax to the location listed below.  Nik Mojica for this referral!    [address] [facility] [address], [address] [address]  Dept: [phone]  Loc: [phone]

## 2022-07-20 ENCOUNTER — HOSPITAL ENCOUNTER (OUTPATIENT)
Dept: PHYSICAL THERAPY | Age: 57
Discharge: HOME | End: 2022-07-20

## 2022-07-20 NOTE — TELEPHONE ENCOUNTER
Are we to be filling this script for the patient?     Requested Prescriptions     Pending Prescriptions Disp Refills    melatonin 3 MG TABS tablet [Pharmacy Med Name: MELATONIN 3 MG TABLET 3 Tablet] 31 tablet 3     Sig: TAKE 1 TABLET BY MOUTH EVERY DAY AT BEDTIME

## 2022-07-20 NOTE — FLOWSHEET NOTE
Physical Therapy  Cancellation/No-show Note  Patient Name:  Michell Rodríguez  :  1965   Date:  2022  Cancelled visits to date: 0  No-shows to date: 1    For today's appointment patient:  []  Cancelled  []  Rescheduled appointment  [x]  No-show     Reason given by patient:  []  Patient ill  []  Conflicting appointment  []  No transportation    []  Conflict with work  [x]  No reason given  []  Other:     Comments:  Unable o leave VM.  All number are disconnected and one is not the patient's VM    Electronically signed by:  SHAMEKA Greer, 2022, 10:11 AM

## 2022-07-21 NOTE — TELEPHONE ENCOUNTER
PT stated that they do not have doctors so they are unable to place the order [facility]

## 2022-07-24 ENCOUNTER — APPOINTMENT (OUTPATIENT)
Dept: CT IMAGING | Age: 57
End: 2022-07-24
Payer: MEDICARE

## 2022-07-24 ENCOUNTER — HOSPITAL ENCOUNTER (EMERGENCY)
Age: 57
Discharge: HOME | End: 2022-07-24
Attending: EMERGENCY MEDICINE
Payer: MEDICARE

## 2022-07-24 VITALS
TEMPERATURE: 98.5 F | RESPIRATION RATE: 18 BRPM | HEART RATE: 100 BPM | OXYGEN SATURATION: 98 % | SYSTOLIC BLOOD PRESSURE: 118 MMHG | DIASTOLIC BLOOD PRESSURE: 82 MMHG

## 2022-07-24 DIAGNOSIS — Z87.898 HISTORY OF SEIZURE: ICD-10-CM

## 2022-07-24 DIAGNOSIS — R55 SYNCOPE AND COLLAPSE: Primary | ICD-10-CM

## 2022-07-24 LAB
ALBUMIN SERPL-MCNC: 4.3 GM/DL (ref 3.4–5)
ALP BLD-CCNC: 65 IU/L (ref 40–129)
ALT SERPL-CCNC: 15 U/L (ref 10–40)
ANION GAP SERPL CALCULATED.3IONS-SCNC: 13 MMOL/L (ref 4–16)
AST SERPL-CCNC: 20 IU/L (ref 15–37)
BASOPHILS ABSOLUTE: 0.1 K/CU MM
BASOPHILS RELATIVE PERCENT: 0.6 % (ref 0–1)
BILIRUB SERPL-MCNC: 0.2 MG/DL (ref 0–1)
BUN BLDV-MCNC: 15 MG/DL (ref 6–23)
CALCIUM SERPL-MCNC: 9.5 MG/DL (ref 8.3–10.6)
CHLORIDE BLD-SCNC: 107 MMOL/L (ref 99–110)
CHP ED QC CHECK: 99
CO2: 22 MMOL/L (ref 21–32)
CREAT SERPL-MCNC: 1 MG/DL (ref 0.9–1.3)
DIFFERENTIAL TYPE: ABNORMAL
DOSE AMOUNT: ABNORMAL
DOSE TIME: ABNORMAL
EOSINOPHILS ABSOLUTE: 0.1 K/CU MM
EOSINOPHILS RELATIVE PERCENT: 1 % (ref 0–3)
GFR AFRICAN AMERICAN: >60 ML/MIN/1.73M2
GFR NON-AFRICAN AMERICAN: >60 ML/MIN/1.73M2
GLUCOSE BLD-MCNC: 101 MG/DL (ref 70–99)
GLUCOSE BLD-MCNC: 107 MG/DL (ref 70–99)
HCT VFR BLD CALC: 42.3 % (ref 42–52)
HEMOGLOBIN: 15 GM/DL (ref 13.5–18)
IMMATURE NEUTROPHIL %: 0.6 % (ref 0–0.43)
LYMPHOCYTES ABSOLUTE: 1.9 K/CU MM
LYMPHOCYTES RELATIVE PERCENT: 15.5 % (ref 24–44)
MAGNESIUM: 2.1 MG/DL (ref 1.8–2.4)
MCH RBC QN AUTO: 33.9 PG (ref 27–31)
MCHC RBC AUTO-ENTMCNC: 35.5 % (ref 32–36)
MCV RBC AUTO: 95.5 FL (ref 78–100)
MONOCYTES ABSOLUTE: 0.8 K/CU MM
MONOCYTES RELATIVE PERCENT: 6.6 % (ref 0–4)
NUCLEATED RBC %: 0 %
PDW BLD-RTO: 11.5 % (ref 11.7–14.9)
PHENYTOIN LEVEL: 8.6 UG/ML (ref 10–20)
PLATELET # BLD: 245 K/CU MM (ref 140–440)
PMV BLD AUTO: 9.2 FL (ref 7.5–11.1)
POTASSIUM SERPL-SCNC: 3.5 MMOL/L (ref 3.5–5.1)
RBC # BLD: 4.43 M/CU MM (ref 4.6–6.2)
SEGMENTED NEUTROPHILS ABSOLUTE COUNT: 9.5 K/CU MM
SEGMENTED NEUTROPHILS RELATIVE PERCENT: 75.7 % (ref 36–66)
SODIUM BLD-SCNC: 142 MMOL/L (ref 135–145)
TOTAL IMMATURE NEUTOROPHIL: 0.08 K/CU MM
TOTAL NUCLEATED RBC: 0 K/CU MM
TOTAL PROTEIN: 7.2 GM/DL (ref 6.4–8.2)
WBC # BLD: 12.5 K/CU MM (ref 4–10.5)

## 2022-07-24 PROCEDURE — 6370000000 HC RX 637 (ALT 250 FOR IP): Performed by: EMERGENCY MEDICINE

## 2022-07-24 PROCEDURE — 85025 COMPLETE CBC W/AUTO DIFF WBC: CPT

## 2022-07-24 PROCEDURE — 80185 ASSAY OF PHENYTOIN TOTAL: CPT

## 2022-07-24 PROCEDURE — 83735 ASSAY OF MAGNESIUM: CPT

## 2022-07-24 PROCEDURE — 70450 CT HEAD/BRAIN W/O DYE: CPT

## 2022-07-24 PROCEDURE — 72125 CT NECK SPINE W/O DYE: CPT

## 2022-07-24 PROCEDURE — 99284 EMERGENCY DEPT VISIT MOD MDM: CPT

## 2022-07-24 PROCEDURE — 80053 COMPREHEN METABOLIC PANEL: CPT

## 2022-07-24 PROCEDURE — 93005 ELECTROCARDIOGRAM TRACING: CPT | Performed by: EMERGENCY MEDICINE

## 2022-07-24 PROCEDURE — 82962 GLUCOSE BLOOD TEST: CPT

## 2022-07-24 RX ORDER — CLONAZEPAM 0.5 MG/1
0.5 TABLET ORAL ONCE
Status: COMPLETED | OUTPATIENT
Start: 2022-07-24 | End: 2022-07-24

## 2022-07-24 RX ADMIN — CLONAZEPAM 0.5 MG: 0.5 TABLET ORAL at 13:07

## 2022-07-24 ASSESSMENT — PAIN - FUNCTIONAL ASSESSMENT: PAIN_FUNCTIONAL_ASSESSMENT: NONE - DENIES PAIN

## 2022-07-24 NOTE — ED TRIAGE NOTES
Patient arrived to ED via EMS with complaints of fall and possible seizure activity. EMS stated patients home health aid called EMS because they heard him fall. Patient has a history os seizure activity. EMS did not have much details of why EMS was called. Patient is not Patient alert to [facility]. Patient is not a good historian on any other details.

## 2022-07-24 NOTE — ED NOTES
D/c instructions reviewed with pt and caregiver. All questions answered. Pt a/o and d/c with caregiver.       Panchito Dickerson RN  07/24/22 [id number]

## 2022-07-24 NOTE — ED PROVIDER NOTES
Emergency Department Encounter  Location: [address] [place] [address]    Patient: Henri Flores  MRN: [id number]  : 1965  Date of evaluation: 2022  ED Provider: Mirella Carbajal DO    Chief Complaint:    Seizures    [place]:  Henri Flores is a 61 y.o. male that presents to the emergency department after episode of unresponsiveness. Patient has a known history of TBI and seizure disorder. His clonazepam was decreased from 0.5 3 times daily to 0.5 nightly on . Today he had been in his usual state of health. Approached a staff member at his group home. A short time later, the staff member heard a \"thud\". EMS received a call for \"unresponsive patient. \"  By the time of EMS arrival, patient was alert. No reported seizure activity, tongue biting or urinary incontinence observed by staff. Patient is a limited historian due to TBI. Per staff member at bedside, he is at his baseline neurologically. ROS:  Limited review of systems secondary to above. Past Medical History:   Diagnosis Date    Brain injury ([facility]     GERD (gastroesophageal reflux disease)     Hyperlipidemia     Hypertension     Seizures ([facility]      History reviewed. No pertinent surgical history. History reviewed. No pertinent family history.   Social History     Socioeconomic History    Marital status: Single     Spouse name: Not on file    Number of children: Not on file    Years of education: Not on file    Highest education level: Not on file   Occupational History    Not on file   Tobacco Use    Smoking status: Every Day     Packs/day: 0.50     Types: Cigarettes    Smokeless tobacco: Never   Vaping Use    Vaping Use: Never used   Substance and Sexual Activity    Alcohol use: No    Drug use: No    Sexual activity: Not on file   Other Topics Concern    Not on file   Social History Narrative    Not on file     Social Determinants of Health     Financial Resource Strain: Not on file   Food Insecurity: Not on file   Transportation Needs: Not on file   Physical Activity: Not on file   Stress: Not on file   Social Connections: Not on file   Intimate Partner Violence: Not on file   Housing Stability: Not on file     No current facility-administered medications for this encounter. Current Outpatient Medications   Medication Sig Dispense Refill    melatonin 3 MG TABS tablet TAKE 1 TABLET BY MOUTH EVERY DAY AT BEDTIME 31 tablet 3    fenofibrate (TRIGLIDE) 160 MG tablet Take 160 mg by mouth daily      finasteride (PROSCAR) 5 MG tablet TAKE 1 TABLET BY MOUTH DAILY      atorvastatin (LIPITOR) 10 MG tablet Take 10 mg by mouth daily      XARELTO 20 MG TABS tablet TAKE 1 TABLET BY MOUTH DAILY      risperiDONE (RISPERDAL) 1 MG tablet Take 1 mg by mouth nightly      phenytoin (DILANTIN) 100 MG ER capsule Take 400 mg by mouth every morning      omeprazole (PRILOSEC) 20 MG delayed release capsule Take 20 mg by mouth daily      fluvoxaMINE (LUVOX) 100 MG tablet Take 200 mg by mouth nightly      Multiple Vitamins-Minerals (THERAPEUTIC MULTIVITAMIN-MINERALS) tablet Take 1 tablet by mouth daily      busPIRone (BUSPAR) 30 MG tablet Take 30 mg by mouth 2 times daily       lamoTRIgine (LAMICTAL) 100 MG tablet Take 100 mg by mouth 2 times daily       clonazePAM (KLONOPIN) 0.5 MG tablet Take 0.5 mg by mouth three times daily. Allergies   Allergen Reactions    Amoxicillin Other (See Comments)     unknown    Bactrim [Sulfamethoxazole-Trimethoprim]        Nursing Notes Reviewed    Physical Exam:  ED Triage Vitals [07/24/22 1238]   Enc Vitals Group      /77      Heart Rate 100      Resp 18      Temp 98.5 °F (36.9 °C)      Temp Source Oral      SpO2 93 %      Weight       Height       Head Circumference       Peak Flow       Pain Score       Pain Loc       Pain Edu? Excl. in [address]? GENERAL APPEARANCE: Awake and alert. Cooperative. No acute distress. HEAD: Normocephalic. Atraumatic.    EYES: EOM's grossly intact. Sclera anicteric. PERRLA.  ENT: Tolerates saliva. No trismus. NECK: Supple. Trachea midline. No midline C-spine tenderness  CARDIO: RRR. Radial pulse 2+. LUNGS: Respirations unlabored. CTAB. ABDOMEN: Soft. Non-distended. Non-tender. EXTREMITIES: No bony tenderness or acute deformities. SKIN: Warm and dry. NEUROLOGICAL: Alert and oriented to situation and person only. Follows instructions with encouragement. Face and smile are symmetric. Symmetric strength and intact sensation x4 extremities. PSYCHIATRIC: Normal mood. Labs:  Results for orders placed or performed during the hospital encounter of 07/24/22   Comprehensive Metabolic Panel w/ Reflex to MG   Result Value Ref Range    Sodium 142 135 - 145 MMOL/L    Potassium 3.5 3.5 - 5.1 MMOL/L    Chloride 107 99 - 110 mMol/L    CO2 22 21 - 32 MMOL/L    BUN 15 6 - 23 MG/DL    Creatinine 1.0 0.9 - 1.3 MG/DL    Glucose 107 (H) 70 - 99 MG/DL    Calcium 9.5 8.3 - 10.6 MG/DL    Albumin 4.3 3.4 - 5.0 GM/DL    Total Protein 7.2 6.4 - 8.2 GM/DL    Total Bilirubin 0.2 0.0 - 1.0 MG/DL    ALT 15 10 - 40 U/L    AST 20 15 - 37 IU/L    Alkaline Phosphatase 65 40 - 129 IU/L    GFR Non-African American >60 >60 mL/min/1.73m2    GFR African American >60 >60 mL/min/1.73m2    Anion Gap 13 4 - 16   Phenytoin Level, Total   Result Value Ref Range    Phenytoin Lvl 8.6 (L) 10 - 20 UG/ML    DOSE AMOUNT DOSE AMT.  GIVEN - UNKNOWN     DOSE TIME DOSE TIME GIVEN - UNKNOWN    CBC with Auto Differential   Result Value Ref Range    WBC 12.5 (H) 4.0 - 10.5 K/CU MM    RBC 4.43 (L) 4.6 - 6.2 M/CU MM    Hemoglobin 15.0 13.5 - 18.0 GM/DL    Hematocrit 42.3 42 - 52 %    MCV 95.5 78 - 100 FL    MCH 33.9 (H) 27 - 31 PG    MCHC 35.5 32.0 - 36.0 %    RDW 11.5 (L) 11.7 - 14.9 %    Platelets 686 602 - 237 K/CU MM    MPV 9.2 7.5 - 11.1 FL    Differential Type AUTOMATED DIFFERENTIAL     Segs Relative 75.7 (H) 36 - 66 %    Lymphocytes % 15.5 (L) 24 - 44 %    Monocytes % 6.6 (H) 0 - 4 % Eosinophils % 1.0 0 - 3 %    Basophils % 0.6 0 - 1 %    Segs Absolute 9.5 K/CU MM    Lymphocytes Absolute 1.9 K/CU MM    Monocytes Absolute 0.8 K/CU MM    Eosinophils Absolute 0.1 K/CU MM    Basophils Absolute 0.1 K/CU MM    Nucleated RBC % 0.0 %    Total Nucleated RBC 0.0 K/CU MM    Total Immature Neutrophil 0.08 K/CU MM    Immature Neutrophil % 0.6 (H) 0 - 0.43 %   Magnesium   Result Value Ref Range    Magnesium 2.1 1.8 - 2.4 mg/dl   POCT Glucose   Result Value Ref Range    QC OK? 99    POCT Glucose   Result Value Ref Range    POC Glucose 101 (H) 70 - 99 MG/DL   EKG 12 Lead   Result Value Ref Range    Ventricular Rate 92 BPM    Atrial Rate 92 BPM    P-R Interval 186 ms    QRS Duration 88 ms    Q-T Interval 344 ms    QTc Calculation (Bazett) 425 ms    P Axis 51 degrees    R Axis 16 degrees    T Axis 74 degrees    Diagnosis       Normal sinus rhythm  Normal ECG  When compared with ECG of 15-MAR-2021 09:42,  No significant change was found  Confirmed by Kathy Terry MD, Sandy Zacarias ([id number]) on 7/26/2022 7:20:18 AM         EKG (if obtained): (All EKG's are interpreted by myself in the absence of a cardiologist)  Sinus rhythm at 92. Normal axis with good R wave progression. No ST elevation or depression. No ectopy. No acute change from prior tracing.     Radiographs (if obtained):  [] The following radiograph was interpreted by myself in the absence of a radiologist:  [x] Radiologist's Report reviewed at time of ED visit:  CT Head WO Contrast    Result Date: 7/24/2022  EXAMINATION: CT OF THE HEAD WITHOUT CONTRAST; CT OF THE CERVICAL SPINE WITHOUT CONTRAST 7/24/2022 1:42 pm; 7/24/2022 1:56 pm TECHNIQUE: CT of the head was performed without the administration of intravenous contrast. Automated exposure control, iterative reconstruction, and/or weight based adjustment of the mA/kV was utilized to reduce the radiation dose to as low as reasonably achievable.; CT of the cervical spine was performed without the administration of intravenous contrast. Multiplanar reformatted images are provided for review. Automated exposure control, iterative reconstruction, and/or weight based adjustment of the mA/kV was utilized to reduce the radiation dose to as low as reasonably achievable. COMPARISON: CT head 12/18/2021. CT cervical spine 04/25/2019. HISTORY: ORDERING SYSTEM PROVIDED HISTORY: TRAUMA TECHNOLOGIST PROVIDED HISTORY: Reason for exam:->TRAUMA Has a \"code stroke\" or \"stroke alert\" been called? ->No Decision Support Exception - unselect if not a suspected or confirmed emergency medical condition->Emergency Medical Condition (MA) Reason for Exam: TRAUMA; ORDERING SYSTEM PROVIDED HISTORY: TRAUMA TECHNOLOGIST PROVIDED HISTORY: Reason for exam:->TRAUMA Decision Support Exception - unselect if not a suspected or confirmed emergency medical condition->Emergency Medical Condition (MA) Reason for Exam: TRAUMA FINDINGS: CT HEAD: BRAIN/VENTRICLES: There is no acute intracranial hemorrhage, mass effect or midline shift. No abnormal extra-axial fluid collection. No evidence for acute infarct. Redemonstration of areas of encephalomalacia to bilateral frontal lobes, right worse than left, similar to prior exam and compatible with old infarct/insult. There is no evidence of hydrocephalus. Stable small metallic radiodensity anteriorly to the right frontal lobe, likely extra-axial, which may be on the basis of prior remote injury or surgical intervention. ORBITS: The visualized portion of the orbits demonstrate no acute abnormality. SINUSES: The visualized paranasal sinuses and mastoid air cells demonstrate no acute abnormality. Stable metallic radiodensities present within the paranasal sinuses which may be on the basis of prior injury or surgical intervention. SOFT TISSUES/SKULL: No acute abnormality of the visualized skull or soft tissues.   Stable chronic deformity bilateral frontal calvarium likely relating to prior surgical intervention and/or injury. CT CERVICAL SPINE: BONES/ALIGNMENT: There is no acute fracture or traumatic malalignment. DEGENERATIVE CHANGES: Multilevel degenerative disc disease and facet osteoarthritis, not appreciably changed from prior exam. SOFT TISSUES: There is no prevertebral soft tissue swelling. CT head: Stable exam without evidence for acute intracranial pathology. CT cervical spine: No acute abnormality of the cervical spine. Multilevel degenerative changes do not appear appreciably changed from prior exam.     CT Cervical Spine WO Contrast    Result Date: 7/24/2022  EXAMINATION: CT OF THE HEAD WITHOUT CONTRAST; CT OF THE CERVICAL SPINE WITHOUT CONTRAST 7/24/2022 1:42 pm; 7/24/2022 1:56 pm TECHNIQUE: CT of the head was performed without the administration of intravenous contrast. Automated exposure control, iterative reconstruction, and/or weight based adjustment of the mA/kV was utilized to reduce the radiation dose to as low as reasonably achievable.; CT of the cervical spine was performed without the administration of intravenous contrast. Multiplanar reformatted images are provided for review. Automated exposure control, iterative reconstruction, and/or weight based adjustment of the mA/kV was utilized to reduce the radiation dose to as low as reasonably achievable. COMPARISON: CT head 12/18/2021. CT cervical spine 04/25/2019. HISTORY: ORDERING SYSTEM PROVIDED HISTORY: TRAUMA TECHNOLOGIST PROVIDED HISTORY: Reason for exam:->TRAUMA Has a \"code stroke\" or \"stroke alert\" been called? ->No Decision Support Exception - unselect if not a suspected or confirmed emergency medical condition->Emergency Medical Condition (MA) Reason for Exam: TRAUMA; ORDERING SYSTEM PROVIDED HISTORY: TRAUMA TECHNOLOGIST PROVIDED HISTORY: Reason for exam:->TRAUMA Decision Support Exception - unselect if not a suspected or confirmed emergency medical condition->Emergency Medical Condition (MA) Reason for Exam: TRAUMA FINDINGS: CT HEAD: BRAIN/VENTRICLES: There is no acute intracranial hemorrhage, mass effect or midline shift. No abnormal extra-axial fluid collection. No evidence for acute infarct. Redemonstration of areas of encephalomalacia to bilateral frontal lobes, right worse than left, similar to prior exam and compatible with old infarct/insult. There is no evidence of hydrocephalus. Stable small metallic radiodensity anteriorly to the right frontal lobe, likely extra-axial, which may be on the basis of prior remote injury or surgical intervention. ORBITS: The visualized portion of the orbits demonstrate no acute abnormality. SINUSES: The visualized paranasal sinuses and mastoid air cells demonstrate no acute abnormality. Stable metallic radiodensities present within the paranasal sinuses which may be on the basis of prior injury or surgical intervention. SOFT TISSUES/SKULL: No acute abnormality of the visualized skull or soft tissues. Stable chronic deformity bilateral frontal calvarium likely relating to prior surgical intervention and/or injury. CT CERVICAL SPINE: BONES/ALIGNMENT: There is no acute fracture or traumatic malalignment. DEGENERATIVE CHANGES: Multilevel degenerative disc disease and facet osteoarthritis, not appreciably changed from prior exam. SOFT TISSUES: There is no prevertebral soft tissue swelling. CT head: Stable exam without evidence for acute intracranial pathology. CT cervical spine: No acute abnormality of the cervical spine. Multilevel degenerative changes do not appear appreciably changed from prior exam.        ED Course and MDM:  Patient is comfortable and peers to be at his baseline neurologically per staff at bedside. No further syncopal or seizure events while in the ED. Labs reviewed. CBC is notable for mild leukocytosis. No renal dysfunction electrolyte regiment. Phenytoin is mildly subtherapeutic.   Suspect the episode today was related to breakthrough seizure secondary to recently decreased benzodiazepines. Caregiver is instructed to resume the previous dose of clonazepam until instructed otherwise per PCP. I think patient is appropriate for outpatient management. Patient is given instructions regarding symptomatic care at home as well as return precautions. To call PCP for follow up in 2-3 days. Patient's caregiver verbalizes understanding of all instructions and is comfortable with the plan of care. Final Impression:  1. Syncope and collapse    2.  History of seizure      DISPOSITION Decision To Discharge 07/24/2022 06:06:04 PM      Patient referred to:  Yee Mcgill DO  [address] [facility]  [address] [facility] [phone]    Schedule an appointment as soon as possible for a visit in 2 days      Carson Dewitt MD  [address] [facility] [id number] [phone]          [address] [facility]  [place] [id number] [phone]    If symptoms worsen  Discharge medications:  Discharge Medication List as of 7/24/2022  6:23 PM        (Please note that portions of this note may have been completed with a voice recognition program. Efforts were made to edit the dictations but occasionally words are mis-transcribed.)    Chelsea Knott DO  08/01/22 [id number]

## 2022-07-25 ENCOUNTER — TELEPHONE (OUTPATIENT)
Dept: NEUROLOGY | Age: 57
End: 2022-07-25

## 2022-07-25 RX ORDER — LANOLIN ALCOHOL/MO/W.PET/CERES
CREAM (GRAM) TOPICAL
Qty: 31 TABLET | Refills: 3 | Status: SHIPPED | OUTPATIENT
Start: 2022-07-25

## 2022-07-25 NOTE — TELEPHONE ENCOUNTER
Received call from Hyacinth Felipe with [facility] requesting appt because pt had two seizures yesterday. She stated he has a new mental health physician that abruptly decreased his clonazepam from three times daily to once daily. Pt was seen at ED and was given a new rx for clonazepam. Pt was just seen here 6/22/22 and to follow up in 6 mo. Do you feel pt needs to follow up sooner? Please advise.

## 2022-07-26 LAB
EKG ATRIAL RATE: 92 BPM
EKG DIAGNOSIS: NORMAL
EKG P AXIS: 51 DEGREES
EKG P-R INTERVAL: 186 MS
EKG Q-T INTERVAL: 344 MS
EKG QRS DURATION: 88 MS
EKG QTC CALCULATION (BAZETT): 425 MS
EKG R AXIS: 16 DEGREES
EKG T AXIS: 74 DEGREES
EKG VENTRICULAR RATE: 92 BPM

## 2022-07-26 PROCEDURE — 93010 ELECTROCARDIOGRAM REPORT: CPT | Performed by: INTERNAL MEDICINE

## 2022-07-26 NOTE — TELEPHONE ENCOUNTER
Received call from Nohelia Caceres stating mental health is still unwilling to prescribe Clonazepam for pt despite his recent seizures following decrease of his medication. Advised that clonazepam is typically only used during seizure to calm down opposed to used as a preventative. She is requesting alternatives/recommendations. Pt is to f/u with PCP tomorrow. Please advise.

## 2022-07-27 ENCOUNTER — TELEPHONE (OUTPATIENT)
Dept: NEUROLOGY | Age: 57
End: 2022-07-27

## 2022-07-27 DIAGNOSIS — G60.9 IDIOPATHIC PERIPHERAL NEUROPATHY: ICD-10-CM

## 2022-07-27 DIAGNOSIS — G40.909 SEIZURE DISORDER (HCC): Primary | ICD-10-CM

## 2022-07-27 DIAGNOSIS — R26.81 GAIT INSTABILITY: ICD-10-CM

## 2022-07-27 DIAGNOSIS — G21.9 SECONDARY PARKINSONISM, UNSPECIFIED SECONDARY PARKINSONISM TYPE (HCC): ICD-10-CM

## 2022-07-27 NOTE — TELEPHONE ENCOUNTER
Pt was taking Clonazepam 0.5 MG TID and was taken down to 0.5 MG BID since this change pt has been having seizures. Spoke with Brad Pickens NP about placing an order for a lamotrigine level to be drawn to determine if his dosage is therapeutic for him. Pt is currently taking 100 MG BID. Brad Pickens is agreeable to this order being placed. Will fax the order to his caregiver Milly Leos @ [phone] for outreach lab to come and draw his labs at home.

## 2022-07-28 ENCOUNTER — HOSPITAL ENCOUNTER (OUTPATIENT)
Age: 57
Setting detail: SPECIMEN
Discharge: HOME | End: 2022-07-28
Payer: MEDICARE

## 2022-07-28 PROCEDURE — 80175 DRUG SCREEN QUAN LAMOTRIGINE: CPT

## 2022-07-28 PROCEDURE — 36415 COLL VENOUS BLD VENIPUNCTURE: CPT

## 2022-07-29 ENCOUNTER — HOSPITAL ENCOUNTER (OUTPATIENT)
Dept: PHYSICAL THERAPY | Age: 57
Setting detail: THERAPIES SERIES
Discharge: HOME | End: 2022-07-29
Payer: MEDICARE

## 2022-07-29 LAB — LAMOTRIGINE LEVEL: 2.6 UG/ML (ref 3–15)

## 2022-07-29 PROCEDURE — 97112 NEUROMUSCULAR REEDUCATION: CPT

## 2022-07-29 PROCEDURE — 97110 THERAPEUTIC EXERCISES: CPT

## 2022-07-29 NOTE — FLOWSHEET NOTE
Outpatient Physical Therapy  [place]           [x] Phone: [phone]   Fax: [phone]  [facility]           [x] Phone: [phone]   Fax: [phone]        Physical Therapy Daily Treatment Note  Date:  2022    Patient Name:  Santana Horner    :  1965  MRN: [id number]  Restrictions/Precautions: No data recorded      Diagnosis:   Unsteadiness on feet [R26.81]    Date of Injury/Surgery:   Treatment Diagnosis:  Balance/gait deficits, min decreased LE weakness  Insurance/Certification information:   [facility] Medicare   Referring Physician:  ABHISHEK Dempsey NP     PCP: Amadeo Myles MD  Next Doctor Visit:    Plan of care signed (Y/N):  N, sent 22  Outcome Measure: ABC: 60%  Visit# / total visits:  2/ 8 per POC  Pain level: 0/10   Goals:     Patient goals: improve balance  Short term goals  Time Frame for Short term goals:   Pt demo I w/ HEP and symptom management  Pt demo 5x time sit to  30 seconds with UE assist to improve functional transfers  Pt demo >850 ft in 6 minutes with no verbal cues for attention to surroundings/obstacles  Pt will demo >12 sec with tandem stance to demo improved strategies for balance recovery. Pt demo 5/5 BLE strength in all directions to improve tolerance to functional transfers  Pt will demo >15% improvement ABC confidence balance scale      Summary of Evaluation:    Pt is 61year old male with gradual onset of balance instability. Pt now has difficulties completing general mobility and transfers with instability and near falls. Pt demo deficits this date that include reduced balance stability, closed chain strength, delayed recovery reactions. Pt will benefit with PT services with progression of strength/ROM, neuro re-ed  to return to [facility]. Pt prior to onset of current condition had lower instability with able to complete full ADLs.  Patient received education on their current pathology and how their condition effects them with their functional tandem stance and air-ex standing. Pt would lean to his R side during balance training and he denied being able to feel that. Pt required modA during balance activity. Pt is 61year old male with gradual onset of balance instability. Pt now has difficulties completing general mobility and transfers with instability and near falls. Pt demo deficits this date that include reduced balance stability, closed chain strength, delayed recovery reactions. Pt will benefit with PT services with progression of strength/ROM, neuro re-ed  to return to [facility]. Pt prior to onset of current condition had lower instability with able to complete full ADLs. Patient received education on their current pathology and how their condition effects them with their functional activities. Patient understood discussion and questions were answered. Patient understands their activity limitations and understands rational for treatment progression. Plan for Next Session: balance stability on various surfaces, closed chain strengthening as able.        Time In / Time Out:    1356/1421        Timed Code/Total Treatment Minutes:   25'/25'      1 NR   1 TE      Next Progress Note due:  Jasmyne 7/13/22   Visit 10       Plan of Care Interventions:  [x] Therapeutic Exercise  [x] Modalities:  [x] Therapeutic Activity     [] Ultrasound  [] Estim  [] Gait Training      [] Cervical Traction [] Lumbar Traction  [x] Neuromuscular Re-education    [x] Cold/hotpack [] Iontophoresis   [x] Instruction in HEP      [] Vasopneumatic   [] Dry Needling    [x] Manual Therapy               [] Aquatic Therapy              Electronically signed by:  Radha Vazquez PTA    7/29/22

## 2022-07-29 NOTE — TELEPHONE ENCOUNTER
Spoke with Mary Corbett and pt had his lamotrigine level drawn yesterday. Will notify pt and caregiver upon receiving result @ [phone].

## 2022-07-29 NOTE — TELEPHONE ENCOUNTER
Called the lab and pt's lamotrigine level will not be resulted until Monday or Tuesday. Called to inform Roxana Steele pt's care giver. Roxana Steele was advised to keep an eye on pt and take him to the ER if he has seizure activity. Roxana Steele voiced understanding.

## 2022-08-02 RX ORDER — LAMOTRIGINE 25 MG/1
TABLET ORAL
Qty: 60 TABLET | Refills: 5 | Status: SHIPPED | OUTPATIENT
Start: 2022-08-02

## 2022-08-02 NOTE — PROGRESS NOTES
Lamotrigine level low at 2.6. I will increase lamotrigine dosage from 100 mg twice daily to 125 mg twice daily.   Prescription sent to [facility] for 25 mg tablets with dosing instructions

## 2022-08-02 NOTE — TELEPHONE ENCOUNTER
Received call back from [facility] and she is inquiring about next steps for pt medication. She is very concerned about his labs and medication adjustment causing seizures. Please advise.

## 2022-08-03 ENCOUNTER — HOSPITAL ENCOUNTER (EMERGENCY)
Age: 57
Discharge: HOME | End: 2022-08-03
Attending: EMERGENCY MEDICINE
Payer: MEDICARE

## 2022-08-03 VITALS
HEART RATE: 78 BPM | SYSTOLIC BLOOD PRESSURE: 107 MMHG | BODY MASS INDEX: 31.21 KG/M2 | TEMPERATURE: 98.4 F | RESPIRATION RATE: 16 BRPM | WEIGHT: 218 LBS | OXYGEN SATURATION: 96 % | HEIGHT: 70 IN | DIASTOLIC BLOOD PRESSURE: 67 MMHG

## 2022-08-03 DIAGNOSIS — G40.919 BREAKTHROUGH SEIZURE (HCC): Primary | ICD-10-CM

## 2022-08-03 LAB
ALBUMIN SERPL-MCNC: 4.5 GM/DL (ref 3.4–5)
ALP BLD-CCNC: 60 IU/L (ref 40–129)
ALT SERPL-CCNC: 18 U/L (ref 10–40)
ANION GAP SERPL CALCULATED.3IONS-SCNC: 11 MMOL/L (ref 4–16)
AST SERPL-CCNC: 27 IU/L (ref 15–37)
BILIRUB SERPL-MCNC: 0.2 MG/DL (ref 0–1)
BUN BLDV-MCNC: 15 MG/DL (ref 6–23)
CALCIUM SERPL-MCNC: 9.4 MG/DL (ref 8.3–10.6)
CHLORIDE BLD-SCNC: 107 MMOL/L (ref 99–110)
CO2: 24 MMOL/L (ref 21–32)
CREAT SERPL-MCNC: 0.9 MG/DL (ref 0.9–1.3)
EKG ATRIAL RATE: 95 BPM
EKG DIAGNOSIS: NORMAL
EKG P AXIS: 60 DEGREES
EKG P-R INTERVAL: 180 MS
EKG Q-T INTERVAL: 342 MS
EKG QRS DURATION: 90 MS
EKG QTC CALCULATION (BAZETT): 429 MS
EKG R AXIS: 28 DEGREES
EKG T AXIS: 59 DEGREES
EKG VENTRICULAR RATE: 95 BPM
GFR AFRICAN AMERICAN: >60 ML/MIN/1.73M2
GFR NON-AFRICAN AMERICAN: >60 ML/MIN/1.73M2
GLUCOSE BLD-MCNC: 110 MG/DL (ref 70–99)
POTASSIUM SERPL-SCNC: 4.6 MMOL/L (ref 3.5–5.1)
SODIUM BLD-SCNC: 142 MMOL/L (ref 135–145)
TOTAL PROTEIN: 6.6 GM/DL (ref 6.4–8.2)

## 2022-08-03 PROCEDURE — 93010 ELECTROCARDIOGRAM REPORT: CPT | Performed by: INTERNAL MEDICINE

## 2022-08-03 PROCEDURE — 6370000000 HC RX 637 (ALT 250 FOR IP): Performed by: EMERGENCY MEDICINE

## 2022-08-03 PROCEDURE — 93005 ELECTROCARDIOGRAM TRACING: CPT | Performed by: EMERGENCY MEDICINE

## 2022-08-03 PROCEDURE — 80053 COMPREHEN METABOLIC PANEL: CPT

## 2022-08-03 PROCEDURE — 99284 EMERGENCY DEPT VISIT MOD MDM: CPT

## 2022-08-03 RX ORDER — CLONAZEPAM 0.5 MG/1
0.5 TABLET ORAL 3 TIMES DAILY
Qty: 21 TABLET | Refills: 0 | Status: SHIPPED | OUTPATIENT
Start: 2022-08-03 | End: 2022-08-09 | Stop reason: SDUPTHER

## 2022-08-03 RX ORDER — CLONAZEPAM 0.5 MG/1
0.5 TABLET ORAL ONCE
Status: COMPLETED | OUTPATIENT
Start: 2022-08-03 | End: 2022-08-03

## 2022-08-03 RX ADMIN — CLONAZEPAM 0.5 MG: 0.5 TABLET ORAL at 14:39

## 2022-08-03 ASSESSMENT — PAIN - FUNCTIONAL ASSESSMENT: PAIN_FUNCTIONAL_ASSESSMENT: NONE - DENIES PAIN

## 2022-08-03 NOTE — ED PROVIDER NOTES
Emergency Department Encounter  Location: [address] [place] [address]    Patient: Cecelia Doshi  MRN: [id number]  : 1965  Date of evaluation: 8/3/2022  ED Provider: Hiro Dawn DO    Chief Complaint:    Seizures    [place]:  Cecelia Doshi is a 61 y.o. male that presents to the emergency department with concern for seizure activity. Patient has a known history of epilepsy. Per staff at his home, he was seated in the front room when he had a 5 to 7-minute episode of tonic-clonic seizure. He did not fall out of the chair or hit his head. They note that he is back to his normal baseline at the time of my assessment. Patient does not really recall the incident. He currently denies any headache, chest pain, back pain or abdominal pain. Per staff he has been eating and drinking. No vomiting or diarrhea. No recent trauma is reported. Patient was seen here on  for similar concern. At that time his clonazepam had recently been decreased from 0.5 mg 3 times daily to 0.5 mg nightly. Staff was instructed to increase this back to his usual dose. They initially did so but for the past 2 days have been giving him 0.5 mg p.o. nightly. Per neurology, he is also supposed to increase his Lamictal from 100 mg p.o. twice daily to 125 mg p.o. twice daily. Staff picked up his prescription to start this morning. Patient is also on 400 Dilantin 400 mg once daily    ROS:  At least 10 systems reviewed and are acutely negative unless otherwise noted in the HPI. History is obtained from both patient and staff at bedside. Past Medical History:   Diagnosis Date    Brain injury ([facility] [address]     GERD (gastroesophageal reflux disease)     Hyperlipidemia     Hypertension     Seizures ([facility] [address]      No past surgical history on file. No family history on file.   Social History     Socioeconomic History    Marital status: Single     Spouse name: Not on file    Number of children: Not on file    Years of education: Not on file    Highest education level: Not on file   Occupational History    Not on file   Tobacco Use    Smoking status: Every Day     Packs/day: 0.50     Types: Cigarettes    Smokeless tobacco: Never   Vaping Use    Vaping Use: Never used   Substance and Sexual Activity    Alcohol use: No    Drug use: No    Sexual activity: Not on file   Other Topics Concern    Not on file   Social History Narrative    Not on file     Social Determinants of Health     Financial Resource Strain: Not on file   Food Insecurity: Not on file   Transportation Needs: Not on file   Physical Activity: Not on file   Stress: Not on file   Social Connections: Not on file   Intimate Partner Violence: Not on file   Housing Stability: Not on file     No current facility-administered medications for this encounter.      Current Outpatient Medications   Medication Sig Dispense Refill    clonazePAM (KLONOPIN) 0.5 MG tablet 1 tablet 3 times daily 90 tablet 3    lamoTRIgine (LAMICTAL) 25 MG tablet Add one 25 mg tablet to morning 100 mg dose x7 days then add 25 mg tablet to nighttime 100 mg dose for total of 125 mg twice daily 60 tablet 5    melatonin 3 MG TABS tablet TAKE 1 TABLET BY MOUTH EVERY DAY AT BEDTIME 31 tablet 3    fenofibrate (TRIGLIDE) 160 MG tablet Take 160 mg by mouth daily      finasteride (PROSCAR) 5 MG tablet TAKE 1 TABLET BY MOUTH DAILY      atorvastatin (LIPITOR) 10 MG tablet Take 10 mg by mouth daily      XARELTO 20 MG TABS tablet TAKE 1 TABLET BY MOUTH DAILY      risperiDONE (RISPERDAL) 1 MG tablet Take 1 mg by mouth nightly      phenytoin (DILANTIN) 100 MG ER capsule Take 400 mg by mouth every morning      omeprazole (PRILOSEC) 20 MG delayed release capsule Take 20 mg by mouth daily      fluvoxaMINE (LUVOX) 100 MG tablet Take 200 mg by mouth nightly      Multiple Vitamins-Minerals (THERAPEUTIC MULTIVITAMIN-MINERALS) tablet Take 1 tablet by mouth daily      busPIRone (BUSPAR) 30 MG tablet Take 30 mg by mouth 2 times daily        Allergies   Allergen Reactions    Amoxicillin Other (See Comments)     unknown    Bactrim [Sulfamethoxazole-Trimethoprim]        Nursing Notes Reviewed    Physical Exam:  ED Triage Vitals   Enc Vitals Group      BP 08/03/22 1144 106/75      Heart Rate 08/03/22 1144 (!) 102      Resp 08/03/22 1144 16      Temp 08/03/22 1144 98.4 °F (36.9 °C)      Temp Source 08/03/22 1144 Oral      SpO2 08/03/22 1144 95 %      Weight 08/03/22 1140 218 lb (98.9 kg)      Height 08/03/22 1140 5' 10\" (1.778 m)      Head Circumference --       Peak Flow --       Pain Score --       Pain Loc --       Pain Edu? --       Excl. in [address]? --      GENERAL APPEARANCE: Awake and alert. Cooperative. No acute distress. Well appearing. HEAD: Normocephalic. Atraumatic. EYES: EOM's grossly intact. Sclera anicteric. ENT: Tolerates saliva. No trismus. NECK: Supple. Trachea midline. CARDIO: RRR. Radial pulse 2+. LUNGS: Respirations unlabored. CTAB. ABDOMEN: Soft. Non-distended. Non-tender. EXTREMITIES: No acute deformities. SKIN: Warm and dry. NEUROLOGICAL: Patient is alert and oriented with clear speech. CN 2-12 are grossly intact. Symmetric motor strength and intact sensation in all extremities. PSYCHIATRIC: Normal mood.      Labs:  Results for orders placed or performed during the hospital encounter of 08/03/22   Comprehensive Metabolic Panel w/ Reflex to MG   Result Value Ref Range    Sodium 142 135 - 145 MMOL/L    Potassium 4.6 3.5 - 5.1 MMOL/L    Chloride 107 99 - 110 mMol/L    CO2 24 21 - 32 MMOL/L    BUN 15 6 - 23 MG/DL    Creatinine 0.9 0.9 - 1.3 MG/DL    Glucose 110 (H) 70 - 99 MG/DL    Calcium 9.4 8.3 - 10.6 MG/DL    Albumin 4.5 3.4 - 5.0 GM/DL    Total Protein 6.6 6.4 - 8.2 GM/DL    Total Bilirubin 0.2 0.0 - 1.0 MG/DL    ALT 18 10 - 40 U/L    AST 27 15 - 37 IU/L    Alkaline Phosphatase 60 40 - 129 IU/L    GFR Non-African American >60 >60 mL/min/1.73m2    GFR African American >60 >60 mL/min/1.73m2    Anion Gap 11 4 - 16   EKG 12 Lead   Result Value Ref Range    Ventricular Rate 95 BPM    Atrial Rate 95 BPM    P-R Interval 180 ms    QRS Duration 90 ms    Q-T Interval 342 ms    QTc Calculation (Bazett) 429 ms    P Axis 60 degrees    R Axis 28 degrees    T Axis 59 degrees    Diagnosis       Normal sinus rhythm  Normal ECG  When compared with ECG of 24-JUL-2022 13:26,  No significant change was found  Confirmed by Damien Solorio MD, [facility] ([id number]) on 8/3/2022 9:26:56 PM         EKG (if obtained): (All EKG's are interpreted by myself in the absence of a cardiologist)  Sinus rhythm at 95. Normal axis with good R wave progression. No ST elevation or depression. No ectopy. No acute change from prior tracing. ED Course and MDM:  Patient at his baseline neurologically on my assessment. He is comfortable and well appearing. Labs today are reviewed and are reassuring. Some concern that seizures are related to recent medication changes. Care is discussed with Sarai Villalobos who has been monitoring his medications. At this time, we've agreed to restart the patient on his clonazepam 0.5 mg PO TID. Patient is given a dose of this in the ED and an RX for 1 week supply. Lyndsey Lawton indicates she will assume responsibility for writing the prescription in the future. I think patient is appropriate for outpatient management. Patient is given instructions regarding symptomatic care at home as well as return precautions. To call neurology for follow up in 2-3 days. Patient's caregivers verbalize understanding of all instructions and are comfortable with the plan of care. Final Impression:  1.  Breakthrough seizure [facility])      DISPOSITION Decision To Discharge 08/03/2022 03:40:44 PM      Patient referred to:  ABHISHEK Bartlett NP  [address] [facility] [address]  [phone]    Call in 2 days      Kim Fernandez MD  [address] [address] [phone] [address] [facility] [address] [facility] Emergency Department  [address] [facility]  [phone]    If symptoms worsen  Discharge medications:  Discharge Medication List as of 8/3/2022  3:50 PM        (Please note that portions of this note may have been completed with a voice recognition program. Efforts were made to edit the dictations but occasionally words are mis-transcribed.)    Nela Brunner, DO       Nela Buys, DO  08/11/22 1021

## 2022-08-03 NOTE — TELEPHONE ENCOUNTER
Notified [facility], caregiver of info and instructions per Chris Rangel. She voiced understanding. She is also requesting an rx for a walker to send to [facility]. Please advise.

## 2022-08-05 ENCOUNTER — TELEPHONE (OUTPATIENT)
Dept: NEUROLOGY | Age: 57
End: 2022-08-05

## 2022-08-05 ENCOUNTER — HOSPITAL ENCOUNTER (OUTPATIENT)
Dept: PHYSICAL THERAPY | Age: 57
Setting detail: THERAPIES SERIES
Discharge: HOME | End: 2022-08-05
Payer: MEDICARE

## 2022-08-05 PROCEDURE — 97112 NEUROMUSCULAR REEDUCATION: CPT

## 2022-08-05 PROCEDURE — 97110 THERAPEUTIC EXERCISES: CPT

## 2022-08-05 NOTE — FLOWSHEET NOTE
Outpatient Physical Therapy  [facility]           [x] Phone: [phone]   Fax: [phone]  [facility]           [x] Phone: [phone]   Fax: [phone]        Physical Therapy Daily Treatment Note  Date:  2022    Patient Name:  Michell Rodríguez    :  1965  MRN: [id number]  Restrictions/Precautions: No data recorded      Diagnosis:   Unsteadiness on feet [R26.81]    Date of Injury/Surgery:   Treatment Diagnosis:  Balance/gait deficits, min decreased LE weakness  Insurance/Certification information:   [facility] Medicare   Referring Physician:  ABHISHEK Kaba NP     PCP: Castro Shafer MD  Next Doctor Visit:    Plan of care signed (Y/N):  Y  Outcome Measure: ABC: 60%  Visit# / total visits:  3/ 8 per POC  Pain level: 0/10         Goals:     Patient goals: improve balance  Short term goals  Time Frame for Short term goals:   Pt demo I w/ HEP and symptom management Met - reports compliance   Pt demo 5x time sit to  30 seconds with UE assist to improve functional transfers  Pt demo >850 ft in 6 minutes with no verbal cues for attention to surroundings/obstacles  Pt will demo >12 sec with tandem stance to demo improved strategies for balance recovery. Pt demo 5/5 BLE strength in all directions to improve tolerance to functional transfers  Pt will demo >15% improvement ABC confidence balance scale      Summary of Evaluation:    Pt is 61year old male with gradual onset of balance instability. Pt now has difficulties completing general mobility and transfers with instability and near falls. Pt demo deficits this date that include reduced balance stability, closed chain strength, delayed recovery reactions. Pt will benefit with PT services with progression of strength/ROM, neuro re-ed  to return to OF. Pt prior to onset of current condition had lower instability with able to complete full ADLs.  Patient received education on their current pathology and how their condition effects them with their functional activities. Patient understood discussion and questions were answered. Patient understands their activity limitations and understands rational for treatment progression. Subjective:    Simon Arrieta arrives to therapy stating that he is feeling okay. He was in the ED Wednesday for another seizure. They are working on adjusting his medications. He reports compliance with his HEP. Any changes in Ambulatory Summary Sheet? None        Objective:   COVID screening questions were asked and patient attested that there had been no contact or symptoms      Reduced resistance level on nu-step from 5 to 3, patient reporting that it was making his legs too tired. Even with reduced resistance he took several rest breaks within the 5' time frame. Significant lean to the R during tandem stance with inability to correct without either HHA or mod assist.     Exercises: (No more than 4 columns) \"Julian\" -- gait belt used with all standing exercises due to seizure risk   Exercise/Equipment 7/13/22  #1 7/29/22 #2 8/5/2022 #3           WARM UP      Nu step  Lv5  5'  Lv5  5'  L3 5'         TABLE      SLR   2# 2*10 ea LE    LAQ  2# ankle wt 2x10 2# 2*10 ea LE                         STANDING      *High marches 20x2 20x2 2*20   *Heel toe raises  10x2 10x2 20*   *STS with pillow on chair  10x x10 Green chair 10* with UE assist;   8* without      Shuttle leg press    --                          PROPRIOCEPTION      *Tandem stance  20\"x3 30\"x2 2*30\" ea    airex perturbations    --   Air-ex standing   30\" x2 EO/EC   2*30\" EO/EC                MODALITIES                      Other Therapeutic Activities/Education:  none       Home Exercise Program:  HO issued, reviewed and discussed with patient. Pt agreed to comply.         Manual Treatments:  --      Modalities:  --      Communication with other providers:  POC sent 7/13/22       Assessment:  Simon Arrieta was easily fatigued during today's session, requiring several rest breaks throughout. He is generally weak and deconditioned and lacks good balance with decreased MIRTHA. End session pain: 0/10      Plan for Next Session: balance stability on various surfaces, closed chain strengthening as able.        Time In / Time Out:   1120/1156        Timed Code/Total Treatment Minutes:   39' 1 NR 10' 1 TE 26'      Next Progress Note due:  Jasmyne 7/13/22   Visit 10       Plan of Care Interventions:  [x] Therapeutic Exercise  [x] Modalities:  [x] Therapeutic Activity     [] Ultrasound  [] Estim  [] Gait Training      [] Cervical Traction [] Lumbar Traction  [x] Neuromuscular Re-education    [x] Cold/hotpack [] Iontophoresis   [x] Instruction in HEP      [] Vasopneumatic   [] Dry Needling    [x] Manual Therapy               [] Aquatic Therapy              Electronically signed by:  Clarisse Aguero    6:54 AM    8/5/2022

## 2022-08-07 ENCOUNTER — HOSPITAL ENCOUNTER (OUTPATIENT)
Age: 57
Setting detail: SPECIMEN
Discharge: HOME | End: 2022-08-07
Payer: MEDICARE

## 2022-08-07 PROCEDURE — 82270 OCCULT BLOOD FECES: CPT

## 2022-08-07 PROCEDURE — 82570 ASSAY OF URINE CREATININE: CPT

## 2022-08-07 PROCEDURE — 82043 UR ALBUMIN QUANTITATIVE: CPT

## 2022-08-08 ENCOUNTER — HOSPITAL ENCOUNTER (OUTPATIENT)
Age: 57
Setting detail: SPECIMEN
Discharge: HOME | End: 2022-08-08
Payer: MEDICARE

## 2022-08-08 LAB
ALBUMIN SERPL-MCNC: 4.2 GM/DL (ref 3.4–5)
ALP BLD-CCNC: 60 IU/L (ref 40–128)
ALT SERPL-CCNC: 14 U/L (ref 10–40)
ANION GAP SERPL CALCULATED.3IONS-SCNC: 13 MMOL/L (ref 4–16)
AST SERPL-CCNC: 15 IU/L (ref 15–37)
BASOPHILS ABSOLUTE: 0.1 K/CU MM
BASOPHILS RELATIVE PERCENT: 1.4 % (ref 0–1)
BILIRUB SERPL-MCNC: 0.3 MG/DL (ref 0–1)
BUN BLDV-MCNC: 13 MG/DL (ref 6–23)
CALCIUM SERPL-MCNC: 8.9 MG/DL (ref 8.3–10.6)
CHLORIDE BLD-SCNC: 107 MMOL/L (ref 99–110)
CHOLESTEROL: 136 MG/DL
CO2: 25 MMOL/L (ref 21–32)
CREAT SERPL-MCNC: 1.1 MG/DL (ref 0.9–1.3)
CREATININE URINE: 257.5 MG/DL (ref 39–259)
DIFFERENTIAL TYPE: ABNORMAL
EOSINOPHILS ABSOLUTE: 0.4 K/CU MM
EOSINOPHILS RELATIVE PERCENT: 5.7 % (ref 0–3)
ESTIMATED AVERAGE GLUCOSE: 111 MG/DL
GFR AFRICAN AMERICAN: >60 ML/MIN/1.73M2
GFR NON-AFRICAN AMERICAN: >60 ML/MIN/1.73M2
GLUCOSE BLD-MCNC: 98 MG/DL (ref 70–99)
HBA1C MFR BLD: 5.5 % (ref 4.2–6.3)
HCT VFR BLD CALC: 41.9 % (ref 42–52)
HDLC SERPL-MCNC: 50 MG/DL
HEMOCCULT SP1 STL QL: NEGATIVE
HEMOGLOBIN: 14.3 GM/DL (ref 13.5–18)
IMMATURE NEUTROPHIL %: 0.6 % (ref 0–0.43)
LDL CHOLESTEROL CALCULATED: 53 MG/DL
LYMPHOCYTES ABSOLUTE: 3 K/CU MM
LYMPHOCYTES RELATIVE PERCENT: 47.1 % (ref 24–44)
MCH RBC QN AUTO: 33.3 PG (ref 27–31)
MCHC RBC AUTO-ENTMCNC: 34.1 % (ref 32–36)
MCV RBC AUTO: 97.4 FL (ref 78–100)
MICROALBUMIN/CREAT 24H UR: <1.2 MG/DL
MICROALBUMIN/CREAT UR-RTO: NORMAL MG/G CREAT (ref 0–30)
MONOCYTES ABSOLUTE: 0.6 K/CU MM
MONOCYTES RELATIVE PERCENT: 9.2 % (ref 0–4)
NUCLEATED RBC %: 0 %
PDW BLD-RTO: 11.9 % (ref 11.7–14.9)
PLATELET # BLD: 242 K/CU MM (ref 140–440)
PMV BLD AUTO: 9.3 FL (ref 7.5–11.1)
POTASSIUM SERPL-SCNC: 4 MMOL/L (ref 3.5–5.1)
RBC # BLD: 4.3 M/CU MM (ref 4.6–6.2)
SEGMENTED NEUTROPHILS ABSOLUTE COUNT: 2.3 K/CU MM
SEGMENTED NEUTROPHILS RELATIVE PERCENT: 36 % (ref 36–66)
SODIUM BLD-SCNC: 145 MMOL/L (ref 135–145)
TOTAL CK: 82 IU/L (ref 38–174)
TOTAL IMMATURE NEUTOROPHIL: 0.04 K/CU MM
TOTAL NUCLEATED RBC: 0 K/CU MM
TOTAL PROTEIN: 6.4 GM/DL (ref 6.4–8.2)
TRIGL SERPL-MCNC: 165 MG/DL
WBC # BLD: 6.3 K/CU MM (ref 4–10.5)

## 2022-08-08 PROCEDURE — 82550 ASSAY OF CK (CPK): CPT

## 2022-08-08 PROCEDURE — 36415 COLL VENOUS BLD VENIPUNCTURE: CPT

## 2022-08-08 PROCEDURE — 80053 COMPREHEN METABOLIC PANEL: CPT

## 2022-08-08 PROCEDURE — 85025 COMPLETE CBC W/AUTO DIFF WBC: CPT

## 2022-08-08 PROCEDURE — 80061 LIPID PANEL: CPT

## 2022-08-08 PROCEDURE — 83036 HEMOGLOBIN GLYCOSYLATED A1C: CPT

## 2022-08-08 NOTE — TELEPHONE ENCOUNTER
Vani Noonan called requesting Clonazepam rx and rx for walker to be sent to [facility].

## 2022-08-09 DIAGNOSIS — G40.919 BREAKTHROUGH SEIZURE (HCC): ICD-10-CM

## 2022-08-09 RX ORDER — CLONAZEPAM 0.5 MG/1
TABLET ORAL
Qty: 90 TABLET | Refills: 3 | Status: SHIPPED | OUTPATIENT
Start: 2022-08-09 | End: 2022-11-09

## 2022-08-09 NOTE — TELEPHONE ENCOUNTER
Notified Drew Garcia that rx sent to [facility] and that walker rx to be sent to [facility]. Faxed order.

## 2022-08-12 ENCOUNTER — HOSPITAL ENCOUNTER (OUTPATIENT)
Dept: PHYSICAL THERAPY | Age: 57
Setting detail: THERAPIES SERIES
Discharge: HOME | End: 2022-08-12
Payer: MEDICARE

## 2022-08-12 PROCEDURE — 97530 THERAPEUTIC ACTIVITIES: CPT

## 2022-08-12 PROCEDURE — 97112 NEUROMUSCULAR REEDUCATION: CPT

## 2022-08-12 PROCEDURE — 97110 THERAPEUTIC EXERCISES: CPT

## 2022-08-12 NOTE — FLOWSHEET NOTE
Outpatient Physical Therapy  [place]           [x] Phone: [phone]   Fax: [phone]  [facility]           [x] Phone: [phone]   Fax: [phone]        Physical Therapy Daily Treatment Note  Date:  2022    Patient Name:  Shreya Lares  \"Julian\"  :  1965  MRN: [id number]  Restrictions/Precautions: No data recorded      Diagnosis:   Unsteadiness on feet [R26.81]    Date of Injury/Surgery:   Treatment Diagnosis:  Balance/gait deficits, min decreased LE weakness  Insurance/Certification information:   [facility] Medicare   Referring Physician:  ABHISHEK Isaacs NP     PCP: Ruthann Alonzo MD  Next Doctor Visit:    Plan of care signed (Y/N):  Y  Outcome Measure: ABC: 60%  Visit# / total visits:   per POC  Pain level: 0/10         Goals:     Patient goals: improve balance  Short term goals  Time Frame for Short term goals:   Pt demo I w/ HEP and symptom management Met - reports compliance   Pt demo 5x time sit to  30 seconds with UE assist to improve functional transfers  Pt demo >850 ft in 6 minutes with no verbal cues for attention to surroundings/obstacles  Pt will demo >12 sec with tandem stance to demo improved strategies for balance recovery. Pt demo 5/5 BLE strength in all directions to improve tolerance to functional transfers  Pt will demo >15% improvement ABC confidence balance scale      Summary of Evaluation:    Pt is 61year old male with gradual onset of balance instability. Pt now has difficulties completing general mobility and transfers with instability and near falls. Pt demo deficits this date that include reduced balance stability, closed chain strength, delayed recovery reactions. Pt will benefit with PT services with progression of strength/ROM, neuro re-ed  to return to OF. Pt prior to onset of current condition had lower instability with able to complete full ADLs.  Patient received education on their current pathology and how their condition effects them with their functional activities. Patient understood discussion and questions were answered. Patient understands their activity limitations and understands rational for treatment progression. Subjective:    Keely Meraz arrives to therapy stating that he is feeling okay. Any issues after last visit. Any changes in Ambulatory Summary Sheet? None        Objective:   COVID screening questions were asked and patient attested that there had been no contact or symptoms      Reduced resistance level on nu-step from 5 to 3 mid way, patient reporting that it was making his legs too tired. Fatigue with lateral step ups with preference to complete one 1 set. Tendency lean to the R with min-mod A for recovery with uneven surfaces. Exercises: (No more than 4 columns) \"Julian\" -- gait belt used with all standing exercises due to seizure risk   Exercise/Equipment 7/13/22  #1 7/29/22 #2 8/5/2022 #3 8/12/22  #4            WARM UP       Nu step  Lv5  5'  Lv5  5'  L3 5' Lv5  4'           TABLE       SLR   2# 2*10 ea LE     LAQ  2# ankle wt 2x10 2# 2*10 ea LE                             STANDING       *High marches 20x2 20x2 2*20 Airex  20x2   *Heel toe raises  10x2 10x2 20*    *STS with pillow on chair  10x x10 Green chair 10* with UE assist;   8* without       Shuttle leg press    -- B LE 4c 10x2   FM bwd stepping     13#  5x2   lateral step ups     6in 10x               PROPRIOCEPTION       *Tandem stance  20\"x3 30\"x2 2*30\" ea     airex perturbations    -- EO 20\"x3 with perturbations     EC 20\"x3    Air-ex standing   30\" x2 EO/EC   2*30\" EO/EC     wobbleboard       DLS on BOSU    40\"Y2   Step taps     6in 20x2          MODALITIES                         Other Therapeutic Activities/Education:  none       Home Exercise Program:  HO issued, reviewed and discussed with patient. Pt agreed to comply.         Manual Treatments:  --      Modalities:  --      Communication with other providers:  POC sent 7/13/22       Assessment: Thea Odell was easily fatigued during today's session, requiring several rest breaks throughout. Closed chain fatigue into quads with rest and reduced set for few exercises. He is generally weak and deconditioned and lacks balance recovery with uneven surfaces. Will continue to address deficits to reduce fall risk and tolerance. End session pain: 0/10      Plan for Next Session: balance stability on various surfaces, closed chain strengthening as able.        Time In / Time Out:   1120/ 1200        Timed Code/Total Treatment Minutes:  40/40'       1 TA  10'   15' neuro     1 TE 15'      Next Progress Note due:  Eval 7/13/22   Visit 10       Plan of Care Interventions:  [x] Therapeutic Exercise  [x] Modalities:  [x] Therapeutic Activity     [] Ultrasound  [] Estim  [] Gait Training      [] Cervical Traction [] Lumbar Traction  [x] Neuromuscular Re-education    [x] Cold/hotpack [] Iontophoresis   [x] Instruction in HEP      [] Vasopneumatic   [] Dry Needling    [x] Manual Therapy               [] Aquatic Therapy              Electronically signed by:  Cielo Henao, PT, DPT, OCS    8/12/2022 8:08 AM

## 2022-08-19 ENCOUNTER — HOSPITAL ENCOUNTER (OUTPATIENT)
Dept: PHYSICAL THERAPY | Age: 57
Setting detail: THERAPIES SERIES
Discharge: HOME | End: 2022-08-19
Payer: MEDICARE

## 2022-08-19 PROCEDURE — 97112 NEUROMUSCULAR REEDUCATION: CPT

## 2022-08-19 NOTE — FLOWSHEET NOTE
Outpatient Physical Therapy  [facility]           [x] Phone: [phone]   Fax: [phone]  [facility]           [x] Phone: [phone]   Fax: [phone]        Physical Therapy Daily Treatment Note  Date:  2022    Patient Name:  Marsha Robles  \"Julian\"  :  1965  MRN: [id number]  Restrictions/Precautions: No data recorded      Diagnosis:   Unsteadiness on feet [R26.81]    Date of Injury/Surgery:   Treatment Diagnosis:  Balance/gait deficits, min decreased LE weakness  Insurance/Certification information:   [facility] Medicare   Referring Physician:  ABHISHEK Araujo NP     PCP: Latasha Cuevas MD  Next Doctor Visit:    Plan of care signed (Y/N):  Y  Outcome Measure: ABC: 60%  Visit# / total visits:  / 8 per POC  Pain level: 0/10         Goals:     Patient goals: improve balance  Short term goals  Time Frame for Short term goals:   Pt demo I w/ HEP and symptom management Met - reports compliance   Pt demo 5x time sit to  30 seconds with UE assist to improve functional transfers  Pt demo >850 ft in 6 minutes with no verbal cues for attention to surroundings/obstacles  Pt will demo >12 sec with tandem stance to demo improved strategies for balance recovery. Pt demo 5/5 BLE strength in all directions to improve tolerance to functional transfers  Pt will demo >15% improvement ABC confidence balance scale      Summary of Evaluation:    Pt is 61year old male with gradual onset of balance instability. Pt now has difficulties completing general mobility and transfers with instability and near falls. Pt demo deficits this date that include reduced balance stability, closed chain strength, delayed recovery reactions. Pt will benefit with PT services with progression of strength/ROM, neuro re-ed  to return to OF. Pt prior to onset of current condition had lower instability with able to complete full ADLs.  Patient received education on their current pathology and how their condition effects them with their functional activities. Patient understood discussion and questions were answered. Patient understands their activity limitations and understands rational for treatment progression. Subjective:    Wilian Billings arrives to therapy stating that he is doing well. Any changes in Ambulatory Summary Sheet? None        Objective:   COVID screening questions were asked and patient attested that there had been no contact or symptoms    Moderate lean to the R during balance with EC. CGA to Min A during resisted walking, cues for controlled speed. Exercises: (No more than 4 columns) \"Julian\" -- gait belt used with all standing exercises due to seizure risk   Exercise/Equipment 8/5/2022 #3 8/12/22  #4 8/19/2022 #5           WARM UP      Nu step  L3 5' Lv5  4'  5'         TABLE      SLR 2# 2*10 ea LE   -   LAQ 2# 2*10 ea LE   -                        STANDING      *High marches 2*20 Airex  20x2 Airex 2*20   *Heel toe raises  20*  -   *STS with pillow on chair  Green chair 10* with UE assist;   8* without     -   Shuttle leg press  -- B LE 4c 10x2 DL 4C 2*10   FM bwd stepping   13#  5x2 13# 2*5   lateral step ups   6in 10x 6\" lateral step up and over 10* ea              PROPRIOCEPTION      *Tandem stance  2*30\" ea   -   airex perturbations  -- EO 20\"x3 with perturbations     EC 20\"x3  EO 2*30\" w/pert    EC 2*30\"   Air-ex standing  2*30\" EO/EC   -   wobbleboard   -   DLS on BOSU  86\"R5 -   Step taps   6in 20x2 6\" 2*20         MODALITIES                      Other Therapeutic Activities/Education:  none       Home Exercise Program:  HO issued, reviewed and discussed with patient. Pt agreed to comply. Manual Treatments:  --      Modalities:  --      Communication with other providers:  POC sent 7/13/22       Assessment:  Wilian Billings was a little more off balance today and required more cues for proper performance of exercise compared to the last time he worked with this therapist assistant.  He demonstrates decreased balance/stability with dynamic activities. End session pain: 0/10      Plan for Next Session: balance stability on various surfaces, closed chain strengthening as able.        Time In / Time Out:   1117/1155        Timed Code/Total Treatment Minutes:  45' 3 NR    Next Progress Note due:  Jasmyne 7/13/22   Visit 10       Plan of Care Interventions:  [x] Therapeutic Exercise  [x] Modalities:  [x] Therapeutic Activity     [] Ultrasound  [] Estim  [] Gait Training      [] Cervical Traction [] Lumbar Traction  [x] Neuromuscular Re-education    [x] Cold/hotpack [] Iontophoresis   [x] Instruction in HEP      [] Vasopneumatic   [] Dry Needling    [x] Manual Therapy               [] Aquatic Therapy              Electronically signed by:  Manuel Baptiste PTA        8/19/2022 8:17 AM

## 2022-08-24 ENCOUNTER — HOSPITAL ENCOUNTER (OUTPATIENT)
Dept: PHYSICAL THERAPY | Age: 57
Setting detail: THERAPIES SERIES
Discharge: HOME | End: 2022-08-24
Payer: MEDICARE

## 2022-08-24 PROCEDURE — 97112 NEUROMUSCULAR REEDUCATION: CPT

## 2022-08-24 PROCEDURE — 97530 THERAPEUTIC ACTIVITIES: CPT

## 2022-08-24 NOTE — DISCHARGE SUMMARY
Outpatient Physical Therapy           [place]           [] Phone: [phone]   Fax: [phone]  [facility]           [] Phone: [phone]   Fax: [phone]      To: ABHISHEK Vela NP     From: Idania Rees, PT, DPT ,OCS   Patient: Rosalind Connolly                    : 1965  Diagnosis:  Unsteadiness on feet [R26.81]        Treatment Diagnosis:    Balance/gait deficits, min decreased LE weakness    Date: 2022  []  Progress Note                [x]  Discharge Note    Evaluation Date:   22  Total Visits to date:   7 Cancels/No-shows to date:  0    Subjective:    Lg Cai arrives to therapy stating that he is doing well. No issues after last visit. Feels ready to continue independently. Plan of Care/Treatment to date:  [x] Therapeutic Exercise    [x] Modalities:  [x] Therapeutic Activity     [] Ultrasound  [] Electrical Stimulation  [x] Gait Training      [] Cervical Traction   [] Lumbar Traction  [x] Neuromuscular Re-education  [] Cold/hotpack [] Iontophoresis  [x] Instruction in HEP      Other:  [x] Manual Therapy       []  Vasopneumatic  [] Aquatic Therapy       []   Dry Needle Therapy                      Objective/Significant Findings At Last Visit/Comments:    Low fall risk with perturbations with eyes open/closed   5x sit to stand: 10.10 sec  with UE assistance      10.59 sec without UE assistance   Tandem stance:  L: 15 sec      R: 16 sec   6MWT: 1077ft with one standing rest break   4+/5 R kne ext,  5/5 all other directions   ABC Confidence scale: 58%        Assessment:   Lg Cai completed 6 visits since start of therapy on 22. Pt has increased ease completing general mobility including prolonged activities. Pt demo improvements that include improve balance recovery, low fall risk, greater closed chain strength and mobility tolerance.  Pt with low fall risk and disability and demo independence with home program.       Goal Status:  [x] Achieved [] Partially Achieved  [] Not Achieved     Patient goals: improve balance  Short term goals  Time Frame for Short term goals:   Pt demo I w/ HEP and symptom management Met - reports compliance    Pt demo 5x time sit to  30 seconds with UE assist to improve functional transfers MET  Pt demo >850 ft in 6 minutes with no verbal cues for attention to surroundings/obstacles  MET  Pt will demo >12 sec with tandem stance to demo improved strategies for balance recovery. MET  Pt demo 5/5 BLE strength in all directions to improve tolerance to functional transfers MET  Pt will demo >15% improvement ABC confidence balance scale Partially MET             Patient Status: [] Continue per initial plan of Care     [x] Patient now discharged     [] Additional visits requested, Please re-certify for additional visits: If we are requesting more visits, we fully anticipate the patient's condition is expected to improve within the treatment timeframe we are requesting. Electronically signed by:  Sreekanth Bernstein, PT, DPT, OCS 8/24/2022, 12:34 PM    8/24/2022 12:34 PM   If you have any questions or concerns, please don't hesitate to call.   Thank you for your referral.    Physician Signature:______________________ Date:______ Time: ________  By signing above, therapists plan is approved by physician

## 2022-08-24 NOTE — FLOWSHEET NOTE
Outpatient Physical Therapy  [place]           [x] Phone: [phone]   Fax: [phone]  [facility]           [x] Phone: [phone]   Fax: [phone]        Physical Therapy Daily Treatment Note  Date:  2022    Patient Name:  Romayne Plumber  \"Julian\"  :  1965  MRN: [id number]  Restrictions/Precautions: No data recorded      Diagnosis:   Unsteadiness on feet [R26.81]    Date of Injury/Surgery:   Treatment Diagnosis:  Balance/gait deficits, min decreased LE weakness  Insurance/Certification information:   [facility] Medicare   Referring Physician:  Neita Phoenix, APRN - NP     PCP: Marilu Taylor MD  Next Doctor Visit:    Plan of care signed (Y/N):  Y  Outcome Measure: ABC: 60%  Visit# / total visits:   per POC  Pain level: 0 /10         Goals:     Patient goals: improve balance  Short term goals  Time Frame for Short term goals:   Pt demo I w/ HEP and symptom management Met - reports compliance    Pt demo 5x time sit to  30 seconds with UE assist to improve functional transfers MET  Pt demo >850 ft in 6 minutes with no verbal cues for attention to surroundings/obstacles  MET  Pt will demo >12 sec with tandem stance to demo improved strategies for balance recovery. MET  Pt demo 5/5 BLE strength in all directions to improve tolerance to functional transfers MET  Pt will demo >15% improvement ABC confidence balance scale Partially MET      Summary of Evaluation:    Pt is 61year old male with gradual onset of balance instability. Pt now has difficulties completing general mobility and transfers with instability and near falls. Pt demo deficits this date that include reduced balance stability, closed chain strength, delayed recovery reactions. Pt will benefit with PT services with progression of strength/ROM, neuro re-ed  to return to OF. Pt prior to onset of current condition had lower instability with able to complete full ADLs.  Patient received education on their current pathology and how their condition effects them with their functional activities. Patient understood discussion and questions were answered. Patient understands their activity limitations and understands rational for treatment progression. Subjective:    Zuri Sethi arrives to therapy stating that he is doing well. No issues after last visit. Any changes in Ambulatory Summary Sheet?   None        Objective:   COVID screening questions were asked and patient attested that there had been no contact or symptoms      Low fall risk with perturbations with eyes open/closed   5x sit to stand: 10.10 sec  with UE assistance      10.59 sec without UE assistance   Tandem stance:  L: 15 sec      R: 16 sec   6MWT: 1077ft with one standing rest break   4+/5 R kne ext,  5/5 all other directions   ABC Confidence scale: 58%          Exercises: (No more than 4 columns) \"Julian\" -- gait belt used with all standing exercises due to seizure risk   Exercise/Equipment 8/5/2022 #3 8/12/22  #4 8/19/2022 #5 8/24/22  #6            WARM UP       Nu step  L3 5' Lv5  4'  5' 4'  Lv5          TABLE       SLR 2# 2*10 ea LE   -    LAQ 2# 2*10 ea LE   -                            STANDING       *High marches 2*20 Airex  20x2 Airex 2*20 Discussed    *Heel toe raises  20*  - Discussed    *STS with pillow on chair  Green chair 10* with UE assist;   8* without     -    Shuttle leg press  -- B LE 4c 10x2 DL 4C 2*10    FM bwd stepping   13#  5x2 13# 2*5    lateral step ups   6in 10x 6\" lateral step up and over 10* ea                PROPRIOCEPTION       *Tandem stance  2*30\" ea   - Testing    airex perturbations  -- EO 20\"x3 with perturbations     EC 20\"x3  EO 2*30\" w/pert    EC 2*30\" EO 2*30\" w/pert   Air-ex standing  2*30\" EO/EC   -    wobbleboard   -    DLS on BOSU  98\"K0 -    Step taps   6in 20x2 6\" 2*20 20x2 on airex for speed           MODALITIES                         Other Therapeutic Activities/Education:  none       Home Exercise Program:  HO issued, reviewed and discussed with patient. Pt agreed to comply. Manual Treatments:  --      Modalities:  --      Communication with other providers:  POC sent 7/13/22           D/c sent 8/24/22      Assessment:  Tiffany Coker completed 6 visits since start of therapy on 7/13/22. Pt has increased ease completing general mobility including prolonged activities. Pt demo improvements that include improve balance recovery, low fall risk, greater closed chain strength and mobility tolerance. Pt with low fall risk and disability and demo independence with home program.         End session pain: 0/10      Plan for Next Session: balance stability on various surfaces, closed chain strengthening as able.        Time In / Time Out:   4244-2630        Timed Code/Total Treatment Minutes:   40/40'   2 NR  30'   10' TA    Next Progress Note due:  Eval 7/13/22   Visit 10       Plan of Care Interventions:  [x] Therapeutic Exercise  [x] Modalities:  [x] Therapeutic Activity     [] Ultrasound  [] Estim  [] Gait Training      [] Cervical Traction [] Lumbar Traction  [x] Neuromuscular Re-education    [x] Cold/hotpack [] Iontophoresis   [x] Instruction in HEP      [] Vasopneumatic   [] Dry Needling    [x] Manual Therapy               [] Aquatic Therapy              Electronically signed by:  Charolet Hamman, PT, DPT, OCS    8/24/2022 9:43 AM

## 2022-10-20 DIAGNOSIS — G40.919 BREAKTHROUGH SEIZURE (HCC): ICD-10-CM

## 2022-10-20 RX ORDER — CLONAZEPAM 0.5 MG/1
TABLET ORAL
Qty: 90 TABLET | Refills: 3 | OUTPATIENT
Start: 2022-10-20

## 2022-11-20 DIAGNOSIS — G40.919 BREAKTHROUGH SEIZURE (HCC): ICD-10-CM

## 2022-11-21 NOTE — TELEPHONE ENCOUNTER
Patient is due to come in for an appointment on 01/06/23, please send in a refill to at least hold her over until then.      Requested Prescriptions     Pending Prescriptions Disp Refills    melatonin 3 MG TABS tablet [Pharmacy Med Name: MELATONIN 3 MG TABLET 3 Tablet] 31 tablet 3     Sig: TAKE 1 TABLET BY MOUTH EVERY DAY AT BEDTIME    clonazePAM (KLONOPIN) 0.5 MG tablet [Pharmacy Med Name: CLONAZEPAM 0.5 MG TABLET 0.5 Tablet] 90 tablet 3     Sig: TAKE 1 TABLET 3 TIMES DAILY

## 2022-11-22 ENCOUNTER — TELEPHONE (OUTPATIENT)
Dept: NEUROLOGY | Age: 57
End: 2022-11-22

## 2022-11-28 RX ORDER — LANOLIN ALCOHOL/MO/W.PET/CERES
CREAM (GRAM) TOPICAL
Qty: 31 TABLET | Refills: 3 | Status: SHIPPED | OUTPATIENT
Start: 2022-11-28

## 2022-11-28 RX ORDER — CLONAZEPAM 0.5 MG/1
TABLET ORAL
Qty: 90 TABLET | Refills: 3 | Status: SHIPPED | OUTPATIENT
Start: 2022-11-28 | End: 2023-05-28

## 2022-12-05 ENCOUNTER — HOSPITAL ENCOUNTER (OUTPATIENT)
Age: 57
Setting detail: SPECIMEN
Discharge: HOME | End: 2022-12-05
Payer: MEDICARE

## 2022-12-05 LAB
ALBUMIN SERPL-MCNC: 4.5 GM/DL (ref 3.4–5)
ALP BLD-CCNC: 63 IU/L (ref 40–128)
ALT SERPL-CCNC: 13 U/L (ref 10–40)
ANION GAP SERPL CALCULATED.3IONS-SCNC: 12 MMOL/L (ref 4–16)
AST SERPL-CCNC: 20 IU/L (ref 15–37)
BASOPHILS ABSOLUTE: 0.1 K/CU MM
BASOPHILS RELATIVE PERCENT: 1.1 % (ref 0–1)
BILIRUB SERPL-MCNC: 0.2 MG/DL (ref 0–1)
BUN BLDV-MCNC: 13 MG/DL (ref 6–23)
CALCIUM SERPL-MCNC: 9 MG/DL (ref 8.3–10.6)
CHLORIDE BLD-SCNC: 104 MMOL/L (ref 99–110)
CHOLESTEROL: 124 MG/DL
CO2: 23 MMOL/L (ref 21–32)
CREAT SERPL-MCNC: 0.9 MG/DL (ref 0.9–1.3)
DIFFERENTIAL TYPE: ABNORMAL
EOSINOPHILS ABSOLUTE: 0.4 K/CU MM
EOSINOPHILS RELATIVE PERCENT: 5.6 % (ref 0–3)
ESTIMATED AVERAGE GLUCOSE: 105 MG/DL
GFR SERPL CREATININE-BSD FRML MDRD: >60 ML/MIN/1.73M2
GLUCOSE BLD-MCNC: 104 MG/DL (ref 70–99)
HBA1C MFR BLD: 5.3 % (ref 4.2–6.3)
HCT VFR BLD CALC: 47.4 % (ref 42–52)
HDLC SERPL-MCNC: 56 MG/DL
HEMOGLOBIN: 16.5 GM/DL (ref 13.5–18)
IMMATURE NEUTROPHIL %: 0.3 % (ref 0–0.43)
LDL CHOLESTEROL CALCULATED: 48 MG/DL
LYMPHOCYTES ABSOLUTE: 2.9 K/CU MM
LYMPHOCYTES RELATIVE PERCENT: 44.4 % (ref 24–44)
MCH RBC QN AUTO: 33.3 PG (ref 27–31)
MCHC RBC AUTO-ENTMCNC: 34.8 % (ref 32–36)
MCV RBC AUTO: 95.6 FL (ref 78–100)
MONOCYTES ABSOLUTE: 0.5 K/CU MM
MONOCYTES RELATIVE PERCENT: 7.3 % (ref 0–4)
NUCLEATED RBC %: 0 %
PDW BLD-RTO: 11.5 % (ref 11.7–14.9)
PLATELET # BLD: 208 K/CU MM (ref 140–440)
PMV BLD AUTO: 9.6 FL (ref 7.5–11.1)
POTASSIUM SERPL-SCNC: 4.1 MMOL/L (ref 3.5–5.1)
RBC # BLD: 4.96 M/CU MM (ref 4.6–6.2)
SEGMENTED NEUTROPHILS ABSOLUTE COUNT: 2.7 K/CU MM
SEGMENTED NEUTROPHILS RELATIVE PERCENT: 41.3 % (ref 36–66)
SODIUM BLD-SCNC: 139 MMOL/L (ref 135–145)
TOTAL CK: 97 IU/L (ref 38–174)
TOTAL IMMATURE NEUTOROPHIL: 0.02 K/CU MM
TOTAL NUCLEATED RBC: 0 K/CU MM
TOTAL PROTEIN: 6.8 GM/DL (ref 6.4–8.2)
TRIGL SERPL-MCNC: 99 MG/DL
WBC # BLD: 6.5 K/CU MM (ref 4–10.5)

## 2022-12-05 PROCEDURE — 82550 ASSAY OF CK (CPK): CPT

## 2022-12-05 PROCEDURE — 36415 COLL VENOUS BLD VENIPUNCTURE: CPT

## 2022-12-05 PROCEDURE — 83036 HEMOGLOBIN GLYCOSYLATED A1C: CPT

## 2022-12-05 PROCEDURE — 85025 COMPLETE CBC W/AUTO DIFF WBC: CPT

## 2022-12-05 PROCEDURE — 80053 COMPREHEN METABOLIC PANEL: CPT

## 2022-12-05 PROCEDURE — 80061 LIPID PANEL: CPT

## 2023-01-06 ENCOUNTER — OFFICE VISIT (OUTPATIENT)
Dept: NEUROLOGY | Age: 58
End: 2023-01-06
Payer: MEDICARE

## 2023-01-06 VITALS
DIASTOLIC BLOOD PRESSURE: 84 MMHG | WEIGHT: 217 LBS | BODY MASS INDEX: 31.07 KG/M2 | OXYGEN SATURATION: 98 % | HEIGHT: 70 IN | HEART RATE: 78 BPM | SYSTOLIC BLOOD PRESSURE: 120 MMHG

## 2023-01-06 DIAGNOSIS — F17.200 SMOKER: ICD-10-CM

## 2023-01-06 DIAGNOSIS — S06.9X9D TRAUMATIC BRAIN INJURY WITH LOSS OF CONSCIOUSNESS, SUBSEQUENT ENCOUNTER: ICD-10-CM

## 2023-01-06 DIAGNOSIS — R27.0 ATAXIA: ICD-10-CM

## 2023-01-06 DIAGNOSIS — R26.81 GAIT INSTABILITY: ICD-10-CM

## 2023-01-06 DIAGNOSIS — G40.909 SEIZURE DISORDER (HCC): Primary | ICD-10-CM

## 2023-01-06 PROCEDURE — G8427 DOCREV CUR MEDS BY ELIG CLIN: HCPCS | Performed by: NURSE PRACTITIONER

## 2023-01-06 PROCEDURE — G8417 CALC BMI ABV UP PARAM F/U: HCPCS | Performed by: NURSE PRACTITIONER

## 2023-01-06 PROCEDURE — 3017F COLORECTAL CA SCREEN DOC REV: CPT | Performed by: NURSE PRACTITIONER

## 2023-01-06 PROCEDURE — 99214 OFFICE O/P EST MOD 30 MIN: CPT | Performed by: NURSE PRACTITIONER

## 2023-01-06 PROCEDURE — G8484 FLU IMMUNIZE NO ADMIN: HCPCS | Performed by: NURSE PRACTITIONER

## 2023-01-06 PROCEDURE — 4004F PT TOBACCO SCREEN RCVD TLK: CPT | Performed by: NURSE PRACTITIONER

## 2023-01-06 RX ORDER — LAMOTRIGINE 25 MG/1
TABLET ORAL
Qty: 60 TABLET | Refills: 5 | Status: SHIPPED | OUTPATIENT
Start: 2023-01-06

## 2023-01-06 NOTE — PROGRESS NOTES
1/6/23    Laxmi Boy  1965    Chief Complaint   Patient presents with    Follow-up     Head injury, seizures. No break through seziures       History of Present Illness  Elle Kramer is a 61 y.o. male presenting today for follow-up of: History of TBI and subsequent seizure disorder. Patient remains on Dilantin 400 mg daily with lamotrigine 125 mg twice daily. In regards to his gait issues, he was ordered more balance therapy. Work-up including EMG of lower extremities and labs for reversible causes of neuropathy were negative. Lamotrigine level was 2.6 (low) on 7/8/2022. His lamotrigine was increased from 100 mg twice daily to 125 mg twice daily. He had a recent CBC and CMP on 12/5/2022. Bryon Fuentes is accompanied by his caregiver. He has not had any breakthrough seizure activity. He has not had any missed doses. In regards to his balance. He did participate in balance therapy without much improvement in his gait. He denies falls but he does veer to the right while walking. He denies any dizziness or lightheadedness. He had an MRI of his brain in 2019 which was nonacute, it showed sequela of his traumatic brain injury.       Current Outpatient Medications   Medication Sig Dispense Refill    lamoTRIgine (LAMICTAL) 25 MG tablet Add one 25 mg tablet to morning 100 mg dose x7 days then add 25 mg tablet to nighttime 100 mg dose for total of 125 mg twice daily 60 tablet 5    melatonin 3 MG TABS tablet TAKE 1 TABLET BY MOUTH EVERY DAY AT BEDTIME 31 tablet 3    clonazePAM (KLONOPIN) 0.5 MG tablet TAKE 1 TABLET 3 TIMES DAILY 90 tablet 3    fenofibrate (TRIGLIDE) 160 MG tablet Take 160 mg by mouth daily      finasteride (PROSCAR) 5 MG tablet TAKE 1 TABLET BY MOUTH DAILY      atorvastatin (LIPITOR) 10 MG tablet Take 10 mg by mouth daily      XARELTO 20 MG TABS tablet TAKE 1 TABLET BY MOUTH DAILY      risperiDONE (RISPERDAL) 1 MG tablet Take 1 mg by mouth nightly      phenytoin (DILANTIN) 100 MG ER capsule Take 400 mg by mouth every morning      omeprazole (PRILOSEC) 20 MG delayed release capsule Take 20 mg by mouth daily      fluvoxaMINE (LUVOX) 100 MG tablet Take 200 mg by mouth nightly      Multiple Vitamins-Minerals (THERAPEUTIC MULTIVITAMIN-MINERALS) tablet Take 1 tablet by mouth daily      busPIRone (BUSPAR) 30 MG tablet Take 30 mg by mouth 2 times daily        No current facility-administered medications for this visit.        Physical Exam:  Also present during visit: health care professional.  Constitutional   Mental Status              Orientation: Oriented to self, place              Mood/affectappropriate mood and appropriate affect              Memory/Other: Fund of knowledge intact, attention/concentration intact, he was able to list the months backwards starting with December but did tell me the wrong month and year initially and timeline of history provided was difficult to follow at times  Language  Language: (normal) language, no dysarthria, (normal) articulation and no dysphasia/aphasia  Cranial Nerves              Eyes: pupils normal size and reactive to light and visual fields appear full              CN III, IV, VI : extraocular muscle strength normal, normal pursuit, no nystagmus and no ptosis              Facial Motor: normal facial motor              CN XII: tongue protrudes midline  Motor/Coordination Exam              Power: no arm drift, normal tone and symmetric b/l  strength normal motor strength              Coordination: normal finger-to-nose, forearm rotation intact and rapid alternating movement normal  Sensory Exam No Bradykinesia, No Dyskindesia, No Tremor and No myoclonus light touch intact, diminished temperature, vibratory and pinprick sensation in bilateral lower extremities that is length dependent                Gait and Stance              Gait/Posture: station normal and Ambulates independently, unsteady casual gait, does tend to lean to the right and have to look down at the ground while walking     /84 (Site: Right Upper Arm, Position: Sitting, Cuff Size: Medium Adult)   Pulse 78   Ht 5' 10\" (1.778 m)   Wt 217 lb (98.4 kg)   SpO2 98%   BMI 31.14 kg/m²     Assessment and Plan     Diagnosis Orders   1. Seizure disorder (HCC)  Lamotrigine Level      2. Gait instability        3. Traumatic brain injury with loss of consciousness, subsequent encounter        4. Ataxia        5. Smoker        Heena Cedeno was seen in neurological follow up in regards to TBI, seizure. Heena Cedeno has not had any breakthrough seizure activity. He will remain on his current AEDs. I will check another lamotrigine level as his Lamictal was increased at his last visit. In regards to his balance, his caregiver tells me balance therapy was not very beneficial.  I am glad to hear he is not falling. He denies dizziness. We discussed possibly using assistive devices for walking if his unsteady gait continues to worsen or for fall prevention. Return in about 6 months (around 7/6/2023).     Sakshi Farley, APRN - CNP

## 2023-01-06 NOTE — PATIENT INSTRUCTIONS
Please contact our office around 4/15/23 to get your follow up appointment made for July 2023. Our scheduling phone number is [phone]. Thank you!

## 2023-01-12 ENCOUNTER — HOSPITAL ENCOUNTER (OUTPATIENT)
Age: 58
Setting detail: SPECIMEN
Discharge: HOME | End: 2023-01-12
Payer: MEDICARE

## 2023-01-12 PROCEDURE — 36415 COLL VENOUS BLD VENIPUNCTURE: CPT

## 2023-01-12 PROCEDURE — 80175 DRUG SCREEN QUAN LAMOTRIGINE: CPT

## 2023-01-14 LAB — LAMOTRIGINE LEVEL: 2.9 UG/ML (ref 3–15)

## 2023-03-02 ENCOUNTER — HOSPITAL ENCOUNTER (OUTPATIENT)
Age: 58
Setting detail: SPECIMEN
Discharge: HOME | End: 2023-03-02
Payer: MEDICARE

## 2023-03-02 LAB
ALBUMIN SERPL-MCNC: 4.6 GM/DL (ref 3.4–5)
ALP BLD-CCNC: 70 IU/L (ref 40–129)
ALT SERPL-CCNC: 15 U/L (ref 10–40)
ANION GAP SERPL CALCULATED.3IONS-SCNC: 13 MMOL/L (ref 4–16)
AST SERPL-CCNC: 28 IU/L (ref 15–37)
BILIRUB SERPL-MCNC: 0.4 MG/DL (ref 0–1)
BUN SERPL-MCNC: 14 MG/DL (ref 6–23)
CALCIUM SERPL-MCNC: 9.4 MG/DL (ref 8.3–10.6)
CHLORIDE BLD-SCNC: 107 MMOL/L (ref 99–110)
CHOLEST SERPL-MCNC: 141 MG/DL
CO2: 23 MMOL/L (ref 21–32)
CREAT SERPL-MCNC: 1 MG/DL (ref 0.9–1.3)
ESTIMATED AVERAGE GLUCOSE: 105 MG/DL
GFR SERPL CREATININE-BSD FRML MDRD: >60 ML/MIN/1.73M2
GLUCOSE SERPL-MCNC: 104 MG/DL (ref 70–99)
HBA1C MFR BLD: 5.3 % (ref 4.2–6.3)
HDLC SERPL-MCNC: 50 MG/DL
LDLC SERPL CALC-MCNC: 64 MG/DL
POTASSIUM SERPL-SCNC: 4.4 MMOL/L (ref 3.5–5.1)
SODIUM BLD-SCNC: 143 MMOL/L (ref 135–145)
TOTAL CK: 119 IU/L (ref 38–174)
TOTAL PROTEIN: 7.1 GM/DL (ref 6.4–8.2)
TRIGL SERPL-MCNC: 134 MG/DL

## 2023-03-02 PROCEDURE — 82570 ASSAY OF URINE CREATININE: CPT

## 2023-03-02 PROCEDURE — 80061 LIPID PANEL: CPT

## 2023-03-02 PROCEDURE — 80053 COMPREHEN METABOLIC PANEL: CPT

## 2023-03-02 PROCEDURE — 82043 UR ALBUMIN QUANTITATIVE: CPT

## 2023-03-02 PROCEDURE — 83036 HEMOGLOBIN GLYCOSYLATED A1C: CPT

## 2023-03-02 PROCEDURE — 36415 COLL VENOUS BLD VENIPUNCTURE: CPT

## 2023-03-02 PROCEDURE — 82550 ASSAY OF CK (CPK): CPT

## 2023-03-03 LAB
CREAT UR-MCNC: 197.2 MG/DL (ref 39–259)
MICROALBUMIN 24H UR-MCNC: <1.2 MG/DL
MICROALBUMIN/CREAT UR-RTO: NORMAL MG/G CREAT (ref 0–30)

## 2023-03-15 ENCOUNTER — TELEPHONE (OUTPATIENT)
Dept: NEUROLOGY | Age: 58
End: 2023-03-15

## 2023-03-15 DIAGNOSIS — G40.909 SEIZURE DISORDER (HCC): Primary | ICD-10-CM

## 2023-03-15 NOTE — TELEPHONE ENCOUNTER
Received call from [facility], caregiver with [facility] [employer], stating that PCP will no longer prescribe lamotrigine or phenytoin. She stated pt is taking lamotrigine 125mg bid and phenytoin 100mg 4 caps qd and would like rx sent to [facility]. Lamotrigine 25mg was prescribed to go along with 100mg dose and has refills so patient only needs 100mg dose that was previously prescribed per PCP.    Requested Prescriptions     Pending Prescriptions Disp Refills    phenytoin (DILANTIN) 100 MG ER capsule 120 capsule 4     Sig: Take 4 capsules by mouth every morning    lamoTRIgine (LAMICTAL) 100 MG tablet 60 tablet 4     Sig: Take 1 tablet by mouth 2 times daily

## 2023-03-16 DIAGNOSIS — G40.919 BREAKTHROUGH SEIZURE (HCC): ICD-10-CM

## 2023-03-16 NOTE — TELEPHONE ENCOUNTER
Patient was last seen on 1/6 and will need a refill.      Requested Prescriptions     Pending Prescriptions Disp Refills    clonazePAM (KLONOPIN) 0.5 MG tablet [Pharmacy Med Name: CLONAZEPAM 0.5 MG TABLET 0.5 Tablet] 93 tablet 5     Sig: TAKE 1 TABLET 3 TIMES DAILY

## 2023-03-17 RX ORDER — LAMOTRIGINE 100 MG/1
100 TABLET ORAL DAILY
Qty: 30 TABLET | Refills: 3 | Status: SHIPPED | OUTPATIENT
Start: 2023-03-17 | End: 2023-03-17 | Stop reason: SDUPTHER

## 2023-03-17 RX ORDER — PHENYTOIN SODIUM 100 MG/1
400 CAPSULE, EXTENDED RELEASE ORAL EVERY MORNING
Qty: 120 CAPSULE | Refills: 3 | Status: SHIPPED | OUTPATIENT
Start: 2023-03-17

## 2023-03-17 RX ORDER — LAMOTRIGINE 100 MG/1
100 TABLET ORAL 2 TIMES DAILY
Qty: 60 TABLET | Refills: 3 | Status: SHIPPED | OUTPATIENT
Start: 2023-03-17

## 2023-03-20 RX ORDER — CLONAZEPAM 0.5 MG/1
TABLET ORAL
Qty: 90 TABLET | Refills: 2 | Status: SHIPPED | OUTPATIENT
Start: 2023-03-20 | End: 2023-09-20

## 2023-03-20 RX ORDER — PHENYTOIN SODIUM 100 MG/1
400 CAPSULE, EXTENDED RELEASE ORAL EVERY MORNING
Qty: 120 CAPSULE | Refills: 4 | Status: CANCELLED | OUTPATIENT
Start: 2023-03-20

## 2023-03-20 RX ORDER — LAMOTRIGINE 100 MG/1
100 TABLET ORAL 2 TIMES DAILY
Qty: 60 TABLET | Refills: 4 | Status: CANCELLED | OUTPATIENT
Start: 2023-03-20

## 2023-03-20 NOTE — TELEPHONE ENCOUNTER
Margie Brown called again requesting us to fill meds because PCP is no longer filling for the patient, called and left a message explaining we already sent in the meds we will fill that are of neurological necessity.

## 2023-04-17 RX ORDER — LANOLIN ALCOHOL/MO/W.PET/CERES
CREAM (GRAM) TOPICAL
Qty: 30 TABLET | Refills: 3 | Status: SHIPPED | OUTPATIENT
Start: 2023-04-17

## 2023-05-16 DIAGNOSIS — G40.919 BREAKTHROUGH SEIZURE (HCC): ICD-10-CM

## 2023-05-16 RX ORDER — CLONAZEPAM 0.5 MG/1
TABLET ORAL
Qty: 90 TABLET | Refills: 2 | OUTPATIENT
Start: 2023-05-16

## 2023-06-06 ENCOUNTER — HOSPITAL ENCOUNTER (OUTPATIENT)
Age: 58
Setting detail: SPECIMEN
Discharge: HOME | End: 2023-06-06
Payer: MEDICARE

## 2023-06-06 LAB
ALBUMIN SERPL-MCNC: 4.5 GM/DL (ref 3.4–5)
ALP BLD-CCNC: 65 IU/L (ref 40–129)
ALT SERPL-CCNC: 15 U/L (ref 10–40)
ANION GAP SERPL CALCULATED.3IONS-SCNC: 13 MMOL/L (ref 4–16)
AST SERPL-CCNC: 17 IU/L (ref 15–37)
BASOPHILS ABSOLUTE: 0.1 K/CU MM
BASOPHILS RELATIVE PERCENT: 1.1 % (ref 0–1)
BILIRUB SERPL-MCNC: 0.3 MG/DL (ref 0–1)
BUN SERPL-MCNC: 13 MG/DL (ref 6–23)
CALCIUM SERPL-MCNC: 9.1 MG/DL (ref 8.3–10.6)
CHLORIDE BLD-SCNC: 107 MMOL/L (ref 99–110)
CHOLEST SERPL-MCNC: 136 MG/DL
CO2: 24 MMOL/L (ref 21–32)
CREAT SERPL-MCNC: 1 MG/DL (ref 0.9–1.3)
DIFFERENTIAL TYPE: ABNORMAL
EOSINOPHILS ABSOLUTE: 0.3 K/CU MM
EOSINOPHILS RELATIVE PERCENT: 4.5 % (ref 0–3)
ESTIMATED AVERAGE GLUCOSE: 114 MG/DL
GFR SERPL CREATININE-BSD FRML MDRD: >60 ML/MIN/1.73M2
GLUCOSE SERPL-MCNC: 93 MG/DL (ref 70–99)
HBA1C MFR BLD: 5.6 % (ref 4.2–6.3)
HCT VFR BLD CALC: 45.3 % (ref 42–52)
HDLC SERPL-MCNC: 48 MG/DL
HEMOGLOBIN: 15.9 GM/DL (ref 13.5–18)
IMMATURE NEUTROPHIL %: 0.4 % (ref 0–0.43)
LDLC SERPL CALC-MCNC: 48 MG/DL
LYMPHOCYTES ABSOLUTE: 2.5 K/CU MM
LYMPHOCYTES RELATIVE PERCENT: 33.2 % (ref 24–44)
MCH RBC QN AUTO: 33.3 PG (ref 27–31)
MCHC RBC AUTO-ENTMCNC: 35.1 % (ref 32–36)
MCV RBC AUTO: 94.8 FL (ref 78–100)
MONOCYTES ABSOLUTE: 0.6 K/CU MM
MONOCYTES RELATIVE PERCENT: 7.8 % (ref 0–4)
NUCLEATED RBC %: 0 %
PDW BLD-RTO: 11.6 % (ref 11.7–14.9)
PLATELET # BLD: 245 K/CU MM (ref 140–440)
PMV BLD AUTO: 10 FL (ref 7.5–11.1)
POTASSIUM SERPL-SCNC: 3.8 MMOL/L (ref 3.5–5.1)
PROSTATE SPECIFIC ANTIGEN: 0.63 NG/ML (ref 0–4)
RBC # BLD: 4.78 M/CU MM (ref 4.6–6.2)
SEGMENTED NEUTROPHILS ABSOLUTE COUNT: 4 K/CU MM
SEGMENTED NEUTROPHILS RELATIVE PERCENT: 53 % (ref 36–66)
SODIUM BLD-SCNC: 144 MMOL/L (ref 135–145)
TOTAL CK: 96 IU/L (ref 38–174)
TOTAL IMMATURE NEUTOROPHIL: 0.03 K/CU MM
TOTAL NUCLEATED RBC: 0 K/CU MM
TOTAL PROTEIN: 6.9 GM/DL (ref 6.4–8.2)
TRIGL SERPL-MCNC: 200 MG/DL
WBC # BLD: 7.6 K/CU MM (ref 4–10.5)

## 2023-06-06 PROCEDURE — 83036 HEMOGLOBIN GLYCOSYLATED A1C: CPT

## 2023-06-06 PROCEDURE — G0103 PSA SCREENING: HCPCS

## 2023-06-06 PROCEDURE — 80061 LIPID PANEL: CPT

## 2023-06-06 PROCEDURE — 80053 COMPREHEN METABOLIC PANEL: CPT

## 2023-06-06 PROCEDURE — 82550 ASSAY OF CK (CPK): CPT

## 2023-06-06 PROCEDURE — 85025 COMPLETE CBC W/AUTO DIFF WBC: CPT

## 2023-06-06 PROCEDURE — 36415 COLL VENOUS BLD VENIPUNCTURE: CPT

## 2023-06-10 ENCOUNTER — HOSPITAL ENCOUNTER (EMERGENCY)
Age: 58
Discharge: HOME | End: 2023-06-10

## 2023-06-10 VITALS
HEIGHT: 70 IN | WEIGHT: 230 LBS | OXYGEN SATURATION: 99 % | RESPIRATION RATE: 16 BRPM | BODY MASS INDEX: 32.93 KG/M2 | SYSTOLIC BLOOD PRESSURE: 145 MMHG | DIASTOLIC BLOOD PRESSURE: 96 MMHG | TEMPERATURE: 97.8 F | HEART RATE: 84 BPM
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
## 2023-06-19 DIAGNOSIS — G40.919 BREAKTHROUGH SEIZURE (HCC): ICD-10-CM

## 2023-06-19 RX ORDER — LAMOTRIGINE 25 MG/1
TABLET ORAL
Qty: 56 TABLET | Refills: 5 | Status: SHIPPED | OUTPATIENT
Start: 2023-06-19

## 2023-06-19 NOTE — TELEPHONE ENCOUNTER
Patient is scheduled to come in on 7/18, please refill as they are out before then.     Requested Prescriptions     Pending Prescriptions Disp Refills    lamoTRIgine (LAMICTAL) 25 MG tablet [Pharmacy Med Name: LAMOTRIGINE 25 MG TABS 25 Tablet] 56 tablet 5     Sig: TAKE 1 TABLET BY MOUTH TWICE DAY **TO TOTAL 125MG PER DOSE** ([id number])

## 2023-06-20 NOTE — TELEPHONE ENCOUNTER
Patient scheduled to come in on 7/18, is this medication still current?     Requested Prescriptions     Pending Prescriptions Disp Refills    clonazePAM (KLONOPIN) 0.5 MG tablet [Pharmacy Med Name: CLONAZEPAM 0.5 MG TABLET 0.5 Tablet] 90 tablet 3     Sig: TAKE 1 TABLET BY MOUTH THREE TIMES A DAY

## 2023-06-21 RX ORDER — CLONAZEPAM 0.5 MG/1
TABLET ORAL
Qty: 90 TABLET | Refills: 1 | Status: SHIPPED | OUTPATIENT
Start: 2023-06-21 | End: 2024-06-21

## 2023-07-18 ENCOUNTER — OFFICE VISIT (OUTPATIENT)
Dept: NEUROLOGY | Age: 58
End: 2023-07-18
Payer: MEDICARE

## 2023-07-18 VITALS — HEART RATE: 83 BPM | SYSTOLIC BLOOD PRESSURE: 110 MMHG | DIASTOLIC BLOOD PRESSURE: 70 MMHG | OXYGEN SATURATION: 96 %

## 2023-07-18 DIAGNOSIS — G47.00 INSOMNIA, UNSPECIFIED TYPE: ICD-10-CM

## 2023-07-18 DIAGNOSIS — S06.9X9D TRAUMATIC BRAIN INJURY WITH LOSS OF CONSCIOUSNESS, SUBSEQUENT ENCOUNTER: Primary | ICD-10-CM

## 2023-07-18 DIAGNOSIS — G40.919 BREAKTHROUGH SEIZURE (HCC): ICD-10-CM

## 2023-07-18 PROCEDURE — 99213 OFFICE O/P EST LOW 20 MIN: CPT | Performed by: NURSE PRACTITIONER

## 2023-07-18 PROCEDURE — 4004F PT TOBACCO SCREEN RCVD TLK: CPT | Performed by: NURSE PRACTITIONER

## 2023-07-18 PROCEDURE — G8427 DOCREV CUR MEDS BY ELIG CLIN: HCPCS | Performed by: NURSE PRACTITIONER

## 2023-07-18 PROCEDURE — 3017F COLORECTAL CA SCREEN DOC REV: CPT | Performed by: NURSE PRACTITIONER

## 2023-07-18 PROCEDURE — G8417 CALC BMI ABV UP PARAM F/U: HCPCS | Performed by: NURSE PRACTITIONER

## 2023-07-18 RX ORDER — CLONAZEPAM 0.5 MG/1
TABLET ORAL
Qty: 90 TABLET | Refills: 1 | Status: SHIPPED | OUTPATIENT
Start: 2023-07-18 | End: 2024-07-18

## 2023-07-18 RX ORDER — LANOLIN ALCOHOL/MO/W.PET/CERES
1 CREAM (GRAM) TOPICAL NIGHTLY
Qty: 30 TABLET | Refills: 3 | Status: SHIPPED | OUTPATIENT
Start: 2023-07-18

## 2023-07-18 RX ORDER — PHENYTOIN SODIUM 100 MG/1
400 CAPSULE, EXTENDED RELEASE ORAL EVERY MORNING
Qty: 120 CAPSULE | Refills: 3 | Status: SHIPPED | OUTPATIENT
Start: 2023-07-18

## 2023-07-18 RX ORDER — LAMOTRIGINE 100 MG/1
100 TABLET ORAL 2 TIMES DAILY
Qty: 60 TABLET | Refills: 3 | Status: SHIPPED | OUTPATIENT
Start: 2023-07-18

## 2023-07-18 RX ORDER — LAMOTRIGINE 25 MG/1
TABLET ORAL
Qty: 56 TABLET | Refills: 5 | Status: SHIPPED | OUTPATIENT
Start: 2023-07-18

## 2023-07-18 NOTE — PROGRESS NOTES
7/19/23    [facility]  1965    Chief Complaint   Patient presents with    Follow-up     Hx TBI,seizures       History of Present Illness  Manuel Montemayor is a 60 y.o. male presenting today for follow-up of:  History of TBI and subsequent seizure disorder. Patient remains on Dilantin 400 mg daily with lamotrigine 125 mg twice daily. He also takes Klonopin 0.5 mg three times daily. In regards to his gait issues, he was ordered more balance therapy. Work-up including EMG of lower extremities and labs for reversible causes of neuropathy were negative. His last lamotrigine level was 2.9 on 1/12/2023. Recent CBC/CMP WNL 6/6/2023. Current Outpatient Medications   Medication Sig Dispense Refill    clonazePAM (KLONOPIN) 0.5 MG tablet TAKE 1 TABLET BY MOUTH THREE TIMES A DAY 90 tablet 1    lamoTRIgine (LAMICTAL) 100 MG tablet Take 1 tablet by mouth 2 times daily 60 tablet 3    lamoTRIgine (LAMICTAL) 25 MG tablet TAKE 1 TABLET BY MOUTH TWICE DAY **TO TOTAL 125MG PER DOSE** ([id number]) 56 tablet 5    melatonin 3 MG TABS tablet Take 1 tablet by mouth nightly at bedtime 30 tablet 3    phenytoin (DILANTIN) 100 MG ER capsule Take 4 capsules by mouth every morning 120 capsule 3    fenofibrate (TRIGLIDE) 160 MG tablet Take 1 tablet by mouth daily      finasteride (PROSCAR) 5 MG tablet TAKE 1 TABLET BY MOUTH DAILY      atorvastatin (LIPITOR) 10 MG tablet Take 1 tablet by mouth daily      XARELTO 20 MG TABS tablet TAKE 1 TABLET BY MOUTH DAILY      risperiDONE (RISPERDAL) 1 MG tablet Take 1 tablet by mouth nightly      fluvoxaMINE (LUVOX) 100 MG tablet Take 2 tablets by mouth nightly      Multiple Vitamins-Minerals (THERAPEUTIC MULTIVITAMIN-MINERALS) tablet Take 1 tablet by mouth daily      busPIRone (BUSPAR) 30 MG tablet Take 30 mg by mouth in the morning and 30 mg in the evening.       omeprazole (PRILOSEC) 20 MG delayed release capsule Take 20 mg by mouth daily (Patient not taking: Reported on 7/18/2023)       No current

## 2023-07-18 NOTE — PATIENT INSTRUCTIONS
Please contact our office around 10/15/2023 to get your follow up appointment made. Our scheduling phone number is [phone]. Thank you!

## 2023-08-15 DIAGNOSIS — G40.919 BREAKTHROUGH SEIZURE (HCC): ICD-10-CM

## 2023-08-15 DIAGNOSIS — S06.9X9D TRAUMATIC BRAIN INJURY WITH LOSS OF CONSCIOUSNESS, SUBSEQUENT ENCOUNTER: ICD-10-CM

## 2023-08-16 RX ORDER — LAMOTRIGINE 100 MG/1
100 TABLET ORAL 2 TIMES DAILY
Qty: 60 TABLET | Refills: 3 | OUTPATIENT
Start: 2023-08-16

## 2023-08-29 ENCOUNTER — HOSPITAL ENCOUNTER (OUTPATIENT)
Age: 58
Setting detail: SPECIMEN
Discharge: HOME | End: 2023-08-29
Payer: MEDICARE

## 2023-08-29 LAB
ALBUMIN SERPL-MCNC: 4.3 GM/DL (ref 3.4–5)
ALP BLD-CCNC: 63 IU/L (ref 40–128)
ALT SERPL-CCNC: 13 U/L (ref 10–40)
ANION GAP SERPL CALCULATED.3IONS-SCNC: 14 MMOL/L (ref 4–16)
AST SERPL-CCNC: 20 IU/L (ref 15–37)
BILIRUB SERPL-MCNC: 0.3 MG/DL (ref 0–1)
BUN SERPL-MCNC: 15 MG/DL (ref 6–23)
CALCIUM SERPL-MCNC: 9.1 MG/DL (ref 8.3–10.6)
CHLORIDE BLD-SCNC: 105 MMOL/L (ref 99–110)
CHOLEST SERPL-MCNC: 135 MG/DL
CO2: 24 MMOL/L (ref 21–32)
CREAT SERPL-MCNC: 1.1 MG/DL (ref 0.9–1.3)
ESTIMATED AVERAGE GLUCOSE: 111 MG/DL
GFR SERPL CREATININE-BSD FRML MDRD: >60 ML/MIN/1.73M2
GLUCOSE SERPL-MCNC: 104 MG/DL (ref 70–99)
HBA1C MFR BLD: 5.5 % (ref 4.2–6.3)
HDLC SERPL-MCNC: 44 MG/DL
LDLC SERPL CALC-MCNC: 50 MG/DL
POTASSIUM SERPL-SCNC: 3.6 MMOL/L (ref 3.5–5.1)
SODIUM BLD-SCNC: 143 MMOL/L (ref 135–145)
TOTAL CK: 105 IU/L (ref 38–174)
TOTAL PROTEIN: 6.5 GM/DL (ref 6.4–8.2)
TRIGL SERPL-MCNC: 206 MG/DL

## 2023-08-29 PROCEDURE — 82550 ASSAY OF CK (CPK): CPT

## 2023-08-29 PROCEDURE — 80053 COMPREHEN METABOLIC PANEL: CPT

## 2023-08-29 PROCEDURE — 83036 HEMOGLOBIN GLYCOSYLATED A1C: CPT

## 2023-08-29 PROCEDURE — 36415 COLL VENOUS BLD VENIPUNCTURE: CPT

## 2023-08-29 PROCEDURE — 80061 LIPID PANEL: CPT

## 2023-09-15 DIAGNOSIS — G40.919 BREAKTHROUGH SEIZURE (HCC): ICD-10-CM

## 2023-09-15 DIAGNOSIS — S06.9X9D TRAUMATIC BRAIN INJURY WITH LOSS OF CONSCIOUSNESS, SUBSEQUENT ENCOUNTER: ICD-10-CM

## 2023-09-15 NOTE — TELEPHONE ENCOUNTER
Last ov 7/18/23, due for next ov 1/2024. Rx pending.      Requested Prescriptions     Pending Prescriptions Disp Refills    clonazePAM (KLONOPIN) 0.5 MG tablet [Pharmacy Med Name: CLONAZEPAM 0.5 MG TABLET 0.5 Tablet] 93 tablet 1     Sig: TAKE 1 TABLET BY MOUTH THREE TIMES A DAY

## 2023-09-19 RX ORDER — CLONAZEPAM 0.5 MG/1
TABLET ORAL
Qty: 93 TABLET | Refills: 1 | Status: SHIPPED | OUTPATIENT
Start: 2023-09-19 | End: 2024-03-19

## 2023-11-27 DIAGNOSIS — G47.00 INSOMNIA, UNSPECIFIED TYPE: ICD-10-CM

## 2023-11-27 DIAGNOSIS — S06.9X9D TRAUMATIC BRAIN INJURY WITH LOSS OF CONSCIOUSNESS, SUBSEQUENT ENCOUNTER: ICD-10-CM

## 2023-11-27 DIAGNOSIS — G40.919 BREAKTHROUGH SEIZURE (HCC): ICD-10-CM

## 2023-11-29 RX ORDER — LANOLIN ALCOHOL/MO/W.PET/CERES
1 CREAM (GRAM) TOPICAL NIGHTLY
Qty: 31 TABLET | Refills: 3 | Status: SHIPPED | OUTPATIENT
Start: 2023-11-29

## 2023-11-29 RX ORDER — CLONAZEPAM 0.5 MG/1
TABLET ORAL
Qty: 93 TABLET | Refills: 1 | Status: SHIPPED | OUTPATIENT
Start: 2023-11-29 | End: 2024-02-29

## 2023-12-07 ENCOUNTER — HOSPITAL ENCOUNTER (OUTPATIENT)
Age: 58
Setting detail: SPECIMEN
Discharge: HOME | End: 2023-12-07
Payer: MEDICARE

## 2023-12-07 LAB
ALBUMIN SERPL-MCNC: 4.3 GM/DL (ref 3.4–5)
ALP BLD-CCNC: 71 IU/L (ref 40–128)
ALT SERPL-CCNC: 18 U/L (ref 10–40)
ANION GAP SERPL CALCULATED.3IONS-SCNC: 13 MMOL/L (ref 4–16)
AST SERPL-CCNC: 25 IU/L (ref 15–37)
BASOPHILS ABSOLUTE: 0.1 K/CU MM
BASOPHILS RELATIVE PERCENT: 1.2 % (ref 0–1)
BILIRUB SERPL-MCNC: 0.4 MG/DL (ref 0–1)
BUN SERPL-MCNC: 16 MG/DL (ref 6–23)
CALCIUM SERPL-MCNC: 9.6 MG/DL (ref 8.3–10.6)
CHLORIDE BLD-SCNC: 103 MMOL/L (ref 99–110)
CHOLEST SERPL-MCNC: 140 MG/DL
CO2: 25 MMOL/L (ref 21–32)
CREAT SERPL-MCNC: 0.9 MG/DL (ref 0.9–1.3)
DIFFERENTIAL TYPE: ABNORMAL
EOSINOPHILS ABSOLUTE: 0.4 K/CU MM
EOSINOPHILS RELATIVE PERCENT: 5.3 % (ref 0–3)
ESTIMATED AVERAGE GLUCOSE: 108 MG/DL
GFR SERPL CREATININE-BSD FRML MDRD: >60 ML/MIN/1.73M2
GLUCOSE SERPL-MCNC: 85 MG/DL (ref 70–99)
HBA1C MFR BLD: 5.4 % (ref 4.2–6.3)
HCT VFR BLD CALC: 44 % (ref 42–52)
HDLC SERPL-MCNC: 45 MG/DL
HEMOGLOBIN: 15.1 GM/DL (ref 13.5–18)
IMMATURE NEUTROPHIL %: 0.3 % (ref 0–0.43)
LDLC SERPL CALC-MCNC: 51 MG/DL
LYMPHOCYTES ABSOLUTE: 3.1 K/CU MM
LYMPHOCYTES RELATIVE PERCENT: 41.9 % (ref 24–44)
MCH RBC QN AUTO: 33.6 PG (ref 27–31)
MCHC RBC AUTO-ENTMCNC: 34.3 % (ref 32–36)
MCV RBC AUTO: 97.8 FL (ref 78–100)
MONOCYTES ABSOLUTE: 0.7 K/CU MM
MONOCYTES RELATIVE PERCENT: 8.9 % (ref 0–4)
NUCLEATED RBC %: 0 %
PDW BLD-RTO: 11.5 % (ref 11.7–14.9)
PLATELET # BLD: 266 K/CU MM (ref 140–440)
PMV BLD AUTO: 9.7 FL (ref 7.5–11.1)
POTASSIUM SERPL-SCNC: 4 MMOL/L (ref 3.5–5.1)
RBC # BLD: 4.5 M/CU MM (ref 4.6–6.2)
SEGMENTED NEUTROPHILS ABSOLUTE COUNT: 3.1 K/CU MM
SEGMENTED NEUTROPHILS RELATIVE PERCENT: 42.4 % (ref 36–66)
SODIUM BLD-SCNC: 141 MMOL/L (ref 135–145)
TOTAL CK: 114 IU/L (ref 38–174)
TOTAL IMMATURE NEUTOROPHIL: 0.02 K/CU MM
TOTAL NUCLEATED RBC: 0 K/CU MM
TOTAL PROTEIN: 6.9 GM/DL (ref 6.4–8.2)
TRIGL SERPL-MCNC: 221 MG/DL
WBC # BLD: 7.3 K/CU MM (ref 4–10.5)

## 2023-12-07 PROCEDURE — 82550 ASSAY OF CK (CPK): CPT

## 2023-12-07 PROCEDURE — 84153 ASSAY OF PSA TOTAL: CPT

## 2023-12-07 PROCEDURE — 80053 COMPREHEN METABOLIC PANEL: CPT

## 2023-12-07 PROCEDURE — 85025 COMPLETE CBC W/AUTO DIFF WBC: CPT

## 2023-12-07 PROCEDURE — 84154 ASSAY OF PSA FREE: CPT

## 2023-12-07 PROCEDURE — 36415 COLL VENOUS BLD VENIPUNCTURE: CPT

## 2023-12-07 PROCEDURE — 80061 LIPID PANEL: CPT

## 2023-12-07 PROCEDURE — 83036 HEMOGLOBIN GLYCOSYLATED A1C: CPT

## 2023-12-09 LAB
PSA FREE MFR SERPL: 33 %
PSA FREE SERPL-MCNC: 0.2 NG/ML
PSA SERPL IA-MCNC: 0.6 NG/ML (ref 0–4)

## 2023-12-11 ENCOUNTER — HOSPITAL ENCOUNTER (OUTPATIENT)
Age: 58
Setting detail: SPECIMEN
Discharge: HOME | End: 2023-12-11
Payer: MEDICARE

## 2023-12-11 PROCEDURE — 82043 UR ALBUMIN QUANTITATIVE: CPT

## 2023-12-11 PROCEDURE — 82570 ASSAY OF URINE CREATININE: CPT

## 2023-12-13 LAB
CREAT UR-MCNC: 227.9 MG/DL (ref 39–259)
MICROALBUMIN 24H UR-MCNC: <1.2 MG/DL
MICROALBUMIN/CREAT UR-RTO: NORMAL MG/G CREAT (ref 0–30)

## 2024-01-15 DIAGNOSIS — G40.919 BREAKTHROUGH SEIZURE (HCC): ICD-10-CM

## 2024-01-15 DIAGNOSIS — S06.9X9D TRAUMATIC BRAIN INJURY WITH LOSS OF CONSCIOUSNESS, SUBSEQUENT ENCOUNTER: ICD-10-CM

## 2024-01-15 NOTE — TELEPHONE ENCOUNTER
Last ov 7/18/23, next ov 1/29/24. Rx pending.     Requested Prescriptions     Pending Prescriptions Disp Refills    lamoTRIgine (LAMICTAL) 25 MG tablet [Pharmacy Med Name: LAMOTRIGINE 25 MG TABS 25 Tablet] 58 tablet 5     Sig: TAKE 1 TABLET BY MOUTH TWICE DAY **TO TOTAL 125MG PER DOSE**    clonazePAM (KLONOPIN) 0.5 MG tablet [Pharmacy Med Name: CLONAZEPAM 0.5 MG TABLET 0.5 Tablet] 87 tablet 1     Sig: TAKE 1 TABLET BY MOUTH THREE TIMES A DAY

## 2024-01-17 RX ORDER — CLONAZEPAM 0.5 MG/1
TABLET ORAL
Qty: 87 TABLET | Refills: 1 | Status: SHIPPED | OUTPATIENT
Start: 2024-01-17 | End: 2024-07-26

## 2024-01-17 RX ORDER — LAMOTRIGINE 25 MG/1
TABLET ORAL
Qty: 58 TABLET | Refills: 5 | Status: SHIPPED | OUTPATIENT
Start: 2024-01-17

## 2024-01-22 ENCOUNTER — APPOINTMENT (OUTPATIENT)
Dept: CT IMAGING | Age: 59
End: 2024-01-22
Payer: MEDICARE

## 2024-01-22 ENCOUNTER — APPOINTMENT (OUTPATIENT)
Dept: GENERAL RADIOLOGY | Age: 59
End: 2024-01-22
Payer: MEDICARE

## 2024-01-22 ENCOUNTER — HOSPITAL ENCOUNTER (EMERGENCY)
Age: 59
Discharge: HOME | End: 2024-01-22
Attending: EMERGENCY MEDICINE
Payer: MEDICARE

## 2024-01-22 VITALS
TEMPERATURE: 97.5 F | DIASTOLIC BLOOD PRESSURE: 83 MMHG | BODY MASS INDEX: 32.93 KG/M2 | WEIGHT: 230 LBS | SYSTOLIC BLOOD PRESSURE: 114 MMHG | HEART RATE: 85 BPM | RESPIRATION RATE: 12 BRPM | OXYGEN SATURATION: 97 % | HEIGHT: 70 IN

## 2024-01-22 DIAGNOSIS — G40.919 BREAKTHROUGH SEIZURE (HCC): Primary | ICD-10-CM

## 2024-01-22 DIAGNOSIS — G40.909 SEIZURE DISORDER (HCC): ICD-10-CM

## 2024-01-22 LAB
ALBUMIN SERPL-MCNC: 3.1 GM/DL (ref 3.4–5)
ALP BLD-CCNC: 61 IU/L (ref 40–129)
ALT SERPL-CCNC: 12 U/L (ref 10–40)
AMPHETAMINES: NEGATIVE
ANION GAP SERPL CALCULATED.3IONS-SCNC: 16 MMOL/L (ref 7–16)
AST SERPL-CCNC: 24 IU/L (ref 15–37)
BARBITURATE SCREEN URINE: NEGATIVE
BASOPHILS ABSOLUTE: 0.1 K/CU MM
BASOPHILS RELATIVE PERCENT: 0.7 % (ref 0–1)
BENZODIAZEPINE SCREEN, URINE: NEGATIVE
BILIRUB SERPL-MCNC: 0.3 MG/DL (ref 0–1)
BILIRUBIN URINE: NEGATIVE MG/DL
BLOOD, URINE: NEGATIVE
BUN SERPL-MCNC: 20 MG/DL (ref 6–23)
CALCIUM SERPL-MCNC: 9.3 MG/DL (ref 8.3–10.6)
CANNABINOID SCREEN URINE: NEGATIVE
CHLORIDE BLD-SCNC: 106 MMOL/L (ref 99–110)
CLARITY: CLEAR
CO2: 16 MMOL/L (ref 21–32)
COCAINE METABOLITE: NEGATIVE
COLOR: YELLOW
COMMENT UA: NORMAL
CREAT SERPL-MCNC: 1.1 MG/DL (ref 0.9–1.3)
DIFFERENTIAL TYPE: ABNORMAL
DOSE AMOUNT: NORMAL
DOSE TIME: NORMAL
EOSINOPHILS ABSOLUTE: 0.3 K/CU MM
EOSINOPHILS RELATIVE PERCENT: 3.1 % (ref 0–3)
FENTANYL URINE: NEGATIVE
GFR SERPL CREATININE-BSD FRML MDRD: >60 ML/MIN/1.73M2
GLUCOSE SERPL-MCNC: 133 MG/DL (ref 70–99)
GLUCOSE, URINE: NEGATIVE MG/DL
HCT VFR BLD CALC: 43.8 % (ref 42–52)
HEMOGLOBIN: 15.2 GM/DL (ref 13.5–18)
IMMATURE NEUTROPHIL %: 0.4 % (ref 0–0.43)
KETONES, URINE: NEGATIVE MG/DL
LACTATE: 3.6 MMOL/L (ref 0.5–1.9)
LEUKOCYTE ESTERASE, URINE: NEGATIVE
LYMPHOCYTES ABSOLUTE: 2.2 K/CU MM
LYMPHOCYTES RELATIVE PERCENT: 21.8 % (ref 24–44)
MCH RBC QN AUTO: 33.4 PG (ref 27–31)
MCHC RBC AUTO-ENTMCNC: 34.7 % (ref 32–36)
MCV RBC AUTO: 96.3 FL (ref 78–100)
MONOCYTES ABSOLUTE: 0.7 K/CU MM
MONOCYTES RELATIVE PERCENT: 6.9 % (ref 0–4)
NITRITE URINE, QUANTITATIVE: NEGATIVE
NUCLEATED RBC %: 0 %
OPIATES, URINE: NEGATIVE
OXYCODONE: NEGATIVE
PDW BLD-RTO: 11.5 % (ref 11.7–14.9)
PH, URINE: 7.5 (ref 5–8)
PHENYTOIN LEVEL: 11.7 UG/ML (ref 10–20)
PLATELET # BLD: 246 K/CU MM (ref 140–440)
PMV BLD AUTO: 9.1 FL (ref 7.5–11.1)
POTASSIUM SERPL-SCNC: 3.8 MMOL/L (ref 3.5–5.1)
PROTEIN UA: NEGATIVE MG/DL
RBC # BLD: 4.55 M/CU MM (ref 4.6–6.2)
SEGMENTED NEUTROPHILS ABSOLUTE COUNT: 6.7 K/CU MM
SEGMENTED NEUTROPHILS RELATIVE PERCENT: 67.1 % (ref 36–66)
SODIUM BLD-SCNC: 138 MMOL/L (ref 135–145)
SPECIFIC GRAVITY UA: 1.02 (ref 1–1.03)
TOTAL CK: 103 IU/L (ref 38–174)
TOTAL IMMATURE NEUTOROPHIL: 0.04 K/CU MM
TOTAL NUCLEATED RBC: 0 K/CU MM
TOTAL PROTEIN: 7.2 GM/DL (ref 6.4–8.2)
UROBILINOGEN, URINE: 0.2 MG/DL (ref 0.2–1)
WBC # BLD: 10 K/CU MM (ref 4–10.5)

## 2024-01-22 PROCEDURE — 85025 COMPLETE CBC W/AUTO DIFF WBC: CPT

## 2024-01-22 PROCEDURE — 80307 DRUG TEST PRSMV CHEM ANLYZR: CPT

## 2024-01-22 PROCEDURE — 70450 CT HEAD/BRAIN W/O DYE: CPT

## 2024-01-22 PROCEDURE — 72125 CT NECK SPINE W/O DYE: CPT

## 2024-01-22 PROCEDURE — 81003 URINALYSIS AUTO W/O SCOPE: CPT

## 2024-01-22 PROCEDURE — 80185 ASSAY OF PHENYTOIN TOTAL: CPT

## 2024-01-22 PROCEDURE — 80175 DRUG SCREEN QUAN LAMOTRIGINE: CPT

## 2024-01-22 PROCEDURE — 83605 ASSAY OF LACTIC ACID: CPT

## 2024-01-22 PROCEDURE — 71045 X-RAY EXAM CHEST 1 VIEW: CPT

## 2024-01-22 PROCEDURE — 80053 COMPREHEN METABOLIC PANEL: CPT

## 2024-01-22 PROCEDURE — 82550 ASSAY OF CK (CPK): CPT

## 2024-01-22 PROCEDURE — 99284 EMERGENCY DEPT VISIT MOD MDM: CPT

## 2024-01-22 RX ORDER — LAMOTRIGINE 150 MG/1
150 TABLET ORAL 2 TIMES DAILY
Qty: 30 TABLET | Refills: 1 | Status: SHIPPED | OUTPATIENT
Start: 2024-01-22

## 2024-01-22 RX ORDER — LAMOTRIGINE 150 MG/1
150 TABLET ORAL 2 TIMES DAILY
COMMUNITY
End: 2024-01-22 | Stop reason: SDUPTHER

## 2024-01-22 NOTE — DISCHARGE INSTRUCTIONS
Follow-up with your neurologist Dr. Quevedo for reevaluation for seizures.  Call for an appointment  Follow-up with primary care physician for reevaluation.  Call for an appointment  Increase Lamictal to a total of 150 mg twice a day  Return to the emergency department immediately any pain fever chills nausea vomiting dizzy lightheadedness or any worsening symptoms.

## 2024-01-22 NOTE — TELEPHONE ENCOUNTER
Received call from Samantha Mireles with [facility] stating patient's lamictal was increased to 150mg bid and he needs new rx sent [facility]. Note to increase in hospital notes. Rx pending. Pt is also scheduled for follow up visit here tomorrow but [facility] is requesting new rx today.     Requested Prescriptions     Pending Prescriptions Disp Refills    lamoTRIgine (LAMICTAL) 150 MG tablet 30 tablet 1     Sig: Take 1 tablet by mouth 2 times daily

## 2024-01-22 NOTE — ED NOTES
Patient discharged to home at this time with caregiver. Discharge instructions and follow up care discussed, patient and caregiver voice understanding.

## 2024-01-23 ENCOUNTER — OFFICE VISIT (OUTPATIENT)
Dept: NEUROLOGY | Age: 59
End: 2024-01-23
Payer: MEDICARE

## 2024-01-23 VITALS
HEIGHT: 70 IN | OXYGEN SATURATION: 96 % | WEIGHT: 232 LBS | HEART RATE: 92 BPM | SYSTOLIC BLOOD PRESSURE: 122 MMHG | BODY MASS INDEX: 33.21 KG/M2 | DIASTOLIC BLOOD PRESSURE: 80 MMHG

## 2024-01-23 DIAGNOSIS — S06.9X9D TRAUMATIC BRAIN INJURY WITH LOSS OF CONSCIOUSNESS, SUBSEQUENT ENCOUNTER: ICD-10-CM

## 2024-01-23 DIAGNOSIS — G40.919 BREAKTHROUGH SEIZURE (HCC): Primary | ICD-10-CM

## 2024-01-23 LAB — LAMOTRIGINE SERPL-MCNC: 1.6 UG/ML (ref 3–15)

## 2024-01-23 PROCEDURE — 4004F PT TOBACCO SCREEN RCVD TLK: CPT | Performed by: NURSE PRACTITIONER

## 2024-01-23 PROCEDURE — G8484 FLU IMMUNIZE NO ADMIN: HCPCS | Performed by: NURSE PRACTITIONER

## 2024-01-23 PROCEDURE — G8427 DOCREV CUR MEDS BY ELIG CLIN: HCPCS | Performed by: NURSE PRACTITIONER

## 2024-01-23 PROCEDURE — 3017F COLORECTAL CA SCREEN DOC REV: CPT | Performed by: NURSE PRACTITIONER

## 2024-01-23 PROCEDURE — G8417 CALC BMI ABV UP PARAM F/U: HCPCS | Performed by: NURSE PRACTITIONER

## 2024-01-23 PROCEDURE — 99214 OFFICE O/P EST MOD 30 MIN: CPT | Performed by: NURSE PRACTITIONER

## 2024-01-23 RX ORDER — LAMOTRIGINE 25 MG/1
25 TABLET ORAL 2 TIMES DAILY
Qty: 30 TABLET | Refills: 0 | Status: SHIPPED | OUTPATIENT
Start: 2024-01-23 | End: 2024-01-23

## 2024-01-23 NOTE — ED PROVIDER NOTES
stable.  No overt pulmonary edema.  The osseous structures are stable.     Low lung volumes with bibasilar atelectasis.     CT CERVICAL SPINE WO CONTRAST    Result Date: 1/22/2024  EXAMINATION: CT OF THE CERVICAL SPINE WITHOUT CONTRAST 1/22/2024 5:29 am TECHNIQUE: CT of the cervical spine was performed without the administration of intravenous contrast. Multiplanar reformatted images are provided for review. Automated exposure control, iterative reconstruction, and/or weight based adjustment of the mA/kV was utilized to reduce the radiation dose to as low as reasonably achievable. COMPARISON: None. HISTORY: ORDERING SYSTEM PROVIDED HISTORY: seizure fall TECHNOLOGIST PROVIDED HISTORY: Reason for exam:->seizure fall Decision Support Exception - unselect if not a suspected or confirmed emergency medical condition->Emergency Medical Condition (MA) Reason for Exam: seizure, fall FINDINGS: BONES/ALIGNMENT: There is no acute fracture or traumatic malalignment. DEGENERATIVE CHANGES: Multilevel degenerative spondylosis more prominent at C3-4, C4-5, C5-6 and C6-7 resulting in moderate to severe spinal canal and neural foraminal narrowing. SOFT TISSUES: There is no prevertebral soft tissue swelling.     No acute osseous abnormality of the cervical spine. Multilevel degenerative spondylosis.     CT HEAD WO CONTRAST    Result Date: 1/22/2024  EXAMINATION: CT OF THE HEAD WITHOUT CONTRAST  1/22/2024 6:28 am TECHNIQUE: CT of the head was performed without the administration of intravenous contrast. Automated exposure control, iterative reconstruction, and/or weight based adjustment of the mA/kV was utilized to reduce the radiation dose to as low as reasonably achievable. COMPARISON: 07/24/2022 CT HISTORY: ORDERING SYSTEM PROVIDED HISTORY: found down hx of seizures TECHNOLOGIST PROVIDED HISTORY: Reason for exam:->found down hx of seizures Has a \"code stroke\" or \"stroke alert\" been called?->No Decision Support Exception - unselect if

## 2024-01-23 NOTE — PROGRESS NOTES
diminished temperature, vibratory and pinprick sensation in bilateral lower extremities that is length dependent                Gait and Stance              Gait/Posture: station normal and Ambulates independently, unsteady casual gait, does tend to lean to the right and have to look down at the ground while walking    /80 (Site: Left Upper Arm, Position: Sitting, Cuff Size: Large Adult)   Pulse 92   Ht 1.778 m (5' 10\")   Wt 105.2 kg (232 lb)   SpO2 96%   BMI 33.29 kg/m²     Assessment and Plan     Diagnosis Orders   1. Breakthrough seizure (HCC)  Lamotrigine Level      2. Traumatic brain injury with loss of consciousness, subsequent encounter        Simba was seen in neurological follow up in regards to history of TBI, seizure disorder.   -He had a breakthrough seizure yesterday, his Lamictal was increased to 150 mg twice daily.  He was given a lamotrigine level order to have completed in 1 month to establish new baseline and to ensure his levels are therapeutic, his last level was subtherapeutic.  He will continue Dilantin 400 mg daily and clonazepam 1 tablet 3 times daily as well.  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  He is able to return to work while still being compliant with seizure precautions (no driving or anything that would harm himself or others in the event of a breakthrough seizure )until 3 months seizure-free.  Note was provided.    Return in about 3 months (around 4/23/2024).    Cristhian Wilcox, APRN - CNP

## 2024-01-25 ENCOUNTER — HOSPITAL ENCOUNTER (OUTPATIENT)
Age: 59
Setting detail: SPECIMEN
Discharge: HOME | End: 2024-01-25
Payer: MEDICARE

## 2024-01-25 PROCEDURE — 36415 COLL VENOUS BLD VENIPUNCTURE: CPT

## 2024-01-25 PROCEDURE — 80175 DRUG SCREEN QUAN LAMOTRIGINE: CPT

## 2024-01-26 LAB — LAMOTRIGINE SERPL-MCNC: 1.5 UG/ML (ref 3–15)

## 2024-01-31 ENCOUNTER — TELEPHONE (OUTPATIENT)
Dept: NEUROLOGY | Age: 59
End: 2024-01-31

## 2024-01-31 NOTE — TELEPHONE ENCOUNTER
----- Message from ABHISHEK Anderson CNP sent at 1/31/2024  5:00 PM EST -----  He was supposed to have his lamotrigine level completed a month after his dose was increased, so this is not all that helpful. Can we call and ensure he is taking 150 mg BID?  ----- Message -----  From: [facility] Incoming Lab Results From [facility] ([facility])  Sent: 1/26/2024   8:24 PM EST  To: ABHISHEK Anderson CNP

## 2024-02-23 ENCOUNTER — HOSPITAL ENCOUNTER (OUTPATIENT)
Age: 59
Setting detail: SPECIMEN
Discharge: HOME | End: 2024-02-23
Payer: MEDICARE

## 2024-02-23 LAB
ALBUMIN SERPL-MCNC: 4.3 GM/DL (ref 3.4–5)
ALP BLD-CCNC: 73 IU/L (ref 40–128)
ALT SERPL-CCNC: 14 U/L (ref 10–40)
ANION GAP SERPL CALCULATED.3IONS-SCNC: 15 MMOL/L (ref 7–16)
AST SERPL-CCNC: 20 IU/L (ref 15–37)
BASOPHILS ABSOLUTE: 0.1 K/CU MM
BASOPHILS RELATIVE PERCENT: 0.8 % (ref 0–1)
BILIRUB SERPL-MCNC: 0.3 MG/DL (ref 0–1)
BUN SERPL-MCNC: 15 MG/DL (ref 6–23)
CALCIUM SERPL-MCNC: 9.6 MG/DL (ref 8.3–10.6)
CHLORIDE BLD-SCNC: 104 MMOL/L (ref 99–110)
CHOLEST SERPL-MCNC: 142 MG/DL
CO2: 24 MMOL/L (ref 21–32)
CREAT SERPL-MCNC: 1 MG/DL (ref 0.9–1.3)
DIFFERENTIAL TYPE: ABNORMAL
EOSINOPHILS ABSOLUTE: 0.3 K/CU MM
EOSINOPHILS RELATIVE PERCENT: 5 % (ref 0–3)
ESTIMATED AVERAGE GLUCOSE: 97 MG/DL
GFR SERPL CREATININE-BSD FRML MDRD: >60 ML/MIN/1.73M2
GLUCOSE SERPL-MCNC: 73 MG/DL (ref 70–99)
HBA1C MFR BLD: 5 % (ref 4.2–6.3)
HCT VFR BLD CALC: 47 % (ref 42–52)
HDLC SERPL-MCNC: 43 MG/DL
HEMOGLOBIN: 15.4 GM/DL (ref 13.5–18)
IMMATURE NEUTROPHIL %: 0.3 % (ref 0–0.43)
LDLC SERPL CALC-MCNC: 54 MG/DL
LYMPHOCYTES ABSOLUTE: 2.4 K/CU MM
LYMPHOCYTES RELATIVE PERCENT: 37.3 % (ref 24–44)
MCH RBC QN AUTO: 32.4 PG (ref 27–31)
MCHC RBC AUTO-ENTMCNC: 32.8 % (ref 32–36)
MCV RBC AUTO: 98.9 FL (ref 78–100)
MONOCYTES ABSOLUTE: 0.6 K/CU MM
MONOCYTES RELATIVE PERCENT: 8.6 % (ref 0–4)
NUCLEATED RBC %: 0 %
PDW BLD-RTO: 11.5 % (ref 11.7–14.9)
PLATELET # BLD: 238 K/CU MM (ref 140–440)
PMV BLD AUTO: 9.9 FL (ref 7.5–11.1)
POTASSIUM SERPL-SCNC: 3.7 MMOL/L (ref 3.5–5.1)
RBC # BLD: 4.75 M/CU MM (ref 4.6–6.2)
SEGMENTED NEUTROPHILS ABSOLUTE COUNT: 3.1 K/CU MM
SEGMENTED NEUTROPHILS RELATIVE PERCENT: 48 % (ref 36–66)
SODIUM BLD-SCNC: 143 MMOL/L (ref 135–145)
TOTAL CK: 110 IU/L (ref 38–174)
TOTAL IMMATURE NEUTOROPHIL: 0.02 K/CU MM
TOTAL NUCLEATED RBC: 0 K/CU MM
TOTAL PROTEIN: 7 GM/DL (ref 6.4–8.2)
TRIGL SERPL-MCNC: 226 MG/DL
WBC # BLD: 6.4 K/CU MM (ref 4–10.5)

## 2024-02-23 PROCEDURE — 85025 COMPLETE CBC W/AUTO DIFF WBC: CPT

## 2024-02-23 PROCEDURE — 82550 ASSAY OF CK (CPK): CPT

## 2024-02-23 PROCEDURE — 83036 HEMOGLOBIN GLYCOSYLATED A1C: CPT

## 2024-02-23 PROCEDURE — 36415 COLL VENOUS BLD VENIPUNCTURE: CPT

## 2024-02-23 PROCEDURE — 80061 LIPID PANEL: CPT

## 2024-02-23 PROCEDURE — 80053 COMPREHEN METABOLIC PANEL: CPT

## 2024-02-29 ENCOUNTER — HOSPITAL ENCOUNTER (OUTPATIENT)
Age: 59
Setting detail: SPECIMEN
Discharge: HOME | End: 2024-02-29
Payer: MEDICARE

## 2024-02-29 LAB
CREAT UR-MCNC: 234.9 MG/DL (ref 39–259)
MICROALBUMIN 24H UR-MCNC: <1.2 MG/DL
MICROALBUMIN/CREAT UR-RTO: NORMAL MG/G CREAT (ref 0–30)

## 2024-02-29 PROCEDURE — 82043 UR ALBUMIN QUANTITATIVE: CPT

## 2024-02-29 PROCEDURE — 82570 ASSAY OF URINE CREATININE: CPT

## 2024-03-15 DIAGNOSIS — S06.9X9D TRAUMATIC BRAIN INJURY WITH LOSS OF CONSCIOUSNESS, SUBSEQUENT ENCOUNTER: ICD-10-CM

## 2024-03-15 DIAGNOSIS — G40.919 BREAKTHROUGH SEIZURE (HCC): ICD-10-CM

## 2024-03-15 NOTE — TELEPHONE ENCOUNTER
Last ov 1/23/24, next ov 4/24/24. Rx pending.     Requested Prescriptions     Pending Prescriptions Disp Refills    clonazePAM (KLONOPIN) 0.5 MG tablet [Pharmacy Med Name: CLONAZEPAM 0.5 MG TABLET 0.5 Tablet] 90 tablet 1     Sig: TAKE 1 TABLET BY MOUTH THREE TIMES A DAY

## 2024-03-18 DIAGNOSIS — G47.00 INSOMNIA, UNSPECIFIED TYPE: ICD-10-CM

## 2024-03-18 RX ORDER — LANOLIN ALCOHOL/MO/W.PET/CERES
1 CREAM (GRAM) TOPICAL NIGHTLY
Qty: 30 TABLET | Refills: 3 | Status: SHIPPED | OUTPATIENT
Start: 2024-03-18

## 2024-03-18 RX ORDER — CLONAZEPAM 0.5 MG/1
TABLET ORAL
Qty: 90 TABLET | Refills: 1 | Status: SHIPPED | OUTPATIENT
Start: 2024-03-18 | End: 2024-09-18

## 2024-04-15 DIAGNOSIS — S06.9X9D TRAUMATIC BRAIN INJURY WITH LOSS OF CONSCIOUSNESS, SUBSEQUENT ENCOUNTER: ICD-10-CM

## 2024-04-15 DIAGNOSIS — G40.919 BREAKTHROUGH SEIZURE (HCC): ICD-10-CM

## 2024-04-15 NOTE — TELEPHONE ENCOUNTER
Last Visit: 1/23/24  Next visit: 4/24/24  Rx pending     Requested Prescriptions     Pending Prescriptions Disp Refills    phenytoin (DILANTIN) 100 MG ER capsule [Pharmacy Med Name: PHENYTOIN 100MG (DILANTIN) 100 Capsule] 124 capsule 3     Sig: TAKE 4 CAPSULES BY MOUTH EVERY MORNING

## 2024-04-16 RX ORDER — PHENYTOIN SODIUM 100 MG/1
400 CAPSULE, EXTENDED RELEASE ORAL EVERY MORNING
Qty: 124 CAPSULE | Refills: 3 | Status: SHIPPED | OUTPATIENT
Start: 2024-04-16

## 2024-04-24 ENCOUNTER — OFFICE VISIT (OUTPATIENT)
Dept: NEUROLOGY | Age: 59
End: 2024-04-24
Payer: MEDICARE

## 2024-04-24 VITALS
BODY MASS INDEX: 31.58 KG/M2 | DIASTOLIC BLOOD PRESSURE: 82 MMHG | OXYGEN SATURATION: 99 % | SYSTOLIC BLOOD PRESSURE: 126 MMHG | HEIGHT: 70 IN | WEIGHT: 220.6 LBS | HEART RATE: 82 BPM

## 2024-04-24 DIAGNOSIS — G40.919 BREAKTHROUGH SEIZURE (HCC): Primary | ICD-10-CM

## 2024-04-24 DIAGNOSIS — S06.9X9D TRAUMATIC BRAIN INJURY WITH LOSS OF CONSCIOUSNESS, SUBSEQUENT ENCOUNTER: ICD-10-CM

## 2024-04-24 PROCEDURE — 99213 OFFICE O/P EST LOW 20 MIN: CPT | Performed by: NURSE PRACTITIONER

## 2024-04-24 PROCEDURE — G8417 CALC BMI ABV UP PARAM F/U: HCPCS | Performed by: NURSE PRACTITIONER

## 2024-04-24 PROCEDURE — G8427 DOCREV CUR MEDS BY ELIG CLIN: HCPCS | Performed by: NURSE PRACTITIONER

## 2024-04-24 PROCEDURE — 3017F COLORECTAL CA SCREEN DOC REV: CPT | Performed by: NURSE PRACTITIONER

## 2024-04-24 PROCEDURE — 4004F PT TOBACCO SCREEN RCVD TLK: CPT | Performed by: NURSE PRACTITIONER

## 2024-04-24 RX ORDER — LORATADINE 10 MG/1
10 TABLET ORAL DAILY
COMMUNITY
Start: 2024-04-15

## 2024-04-24 RX ORDER — HYDROCHLOROTHIAZIDE 12.5 MG/1
12.5 TABLET ORAL EVERY MORNING
COMMUNITY
Start: 2024-04-15

## 2024-04-24 NOTE — PROGRESS NOTES
4/24/24    Simba Duran  1965    Chief Complaint   Patient presents with    Follow-up     Patient here for follow-up for seizure.        History of Present Illness  Simba is a 59 y.o. male presenting today for follow-up of:  TBI, seizure disorder.  His last breakthrough seizure was 3 months ago.  His Lamictal was increased to 150 mg twice daily.  He continues Dilantin 400 mg daily and clonazepam 1 tablet 3 times daily.    Today, Jacob is accompanied by a healthcare professional.  He has not had any further breakthrough seizure activity since his lamotrigine was increased last visit to 150 mg twice daily.    He did tell me today that he is interested in quitting smoking.  He smokes 1/2 pack a day. he will address this with his PCP.  Did let him know to avoid Wellbutrin or Chantix as these medications can lower the seizure threshold.        Current Outpatient Medications   Medication Sig Dispense Refill    loratadine (CLARITIN) 10 MG tablet Take 1 tablet by mouth daily      hydroCHLOROthiazide 12.5 MG tablet Take 1 tablet by mouth every morning      phenytoin (DILANTIN) 100 MG ER capsule TAKE 4 CAPSULES BY MOUTH EVERY MORNING 124 capsule 3    clonazePAM (KLONOPIN) 0.5 MG tablet TAKE 1 TABLET BY MOUTH THREE TIMES A DAY 90 tablet 1    melatonin 3 MG TABS tablet TAKE 1 TABLET BY MOUTH EVERY DAY AT BEDTIME 30 tablet 3    lamoTRIgine (LAMICTAL) 150 MG tablet Take 1 tablet by mouth 2 times daily 30 tablet 1    fenofibrate (TRIGLIDE) 160 MG tablet Take 1 tablet by mouth daily      finasteride (PROSCAR) 5 MG tablet TAKE 1 TABLET BY MOUTH DAILY      atorvastatin (LIPITOR) 10 MG tablet Take 2 tablets by mouth daily      XARELTO 20 MG TABS tablet TAKE 1 TABLET BY MOUTH DAILY      risperiDONE (RISPERDAL) 1 MG tablet Take 0.5 tablets by mouth nightly      omeprazole (PRILOSEC) 20 MG delayed release capsule Take 1 capsule by mouth daily      fluvoxaMINE (LUVOX) 100 MG tablet Take 2 tablets by mouth nightly      Multiple

## 2024-05-15 DIAGNOSIS — G40.919 BREAKTHROUGH SEIZURE (HCC): ICD-10-CM

## 2024-05-15 DIAGNOSIS — S06.9X9D TRAUMATIC BRAIN INJURY WITH LOSS OF CONSCIOUSNESS, SUBSEQUENT ENCOUNTER: ICD-10-CM

## 2024-05-15 RX ORDER — CLONAZEPAM 0.5 MG/1
TABLET ORAL
Qty: 90 TABLET | Refills: 1 | Status: SHIPPED | OUTPATIENT
Start: 2024-05-15 | End: 2024-11-15

## 2024-05-15 NOTE — TELEPHONE ENCOUNTER
Last Visit: 4/24/24  No follow up on file.     Rx pending     Requested Prescriptions     Pending Prescriptions Disp Refills    clonazePAM (KLONOPIN) 0.5 MG tablet [Pharmacy Med Name: CLONAZEPAM 0.5 MG TABLET 0.5 Tablet] 90 tablet 1     Sig: TAKE 1 TABLET BY MOUTH THREE TIMES A DAY

## 2024-05-29 ENCOUNTER — HOSPITAL ENCOUNTER (OUTPATIENT)
Age: 59
Setting detail: SPECIMEN
Discharge: HOME | End: 2024-05-29
Payer: MEDICARE

## 2024-05-29 LAB
ALBUMIN SERPL-MCNC: 4.3 GM/DL (ref 3.4–5)
ALP BLD-CCNC: 59 IU/L (ref 40–128)
ALT SERPL-CCNC: 15 U/L (ref 10–40)
ANION GAP SERPL CALCULATED.3IONS-SCNC: 14 MMOL/L (ref 7–16)
AST SERPL-CCNC: 40 IU/L (ref 15–37)
BASOPHILS ABSOLUTE: 0.1 K/CU MM
BASOPHILS RELATIVE PERCENT: 1.3 % (ref 0–1)
BILIRUB SERPL-MCNC: 0.3 MG/DL (ref 0–1)
BUN SERPL-MCNC: 16 MG/DL (ref 6–23)
CALCIUM SERPL-MCNC: 8.6 MG/DL (ref 8.3–10.6)
CHLORIDE BLD-SCNC: 105 MMOL/L (ref 99–110)
CHOLEST SERPL-MCNC: 110 MG/DL
CO2: 23 MMOL/L (ref 21–32)
CREAT SERPL-MCNC: 1 MG/DL (ref 0.9–1.3)
DIFFERENTIAL TYPE: ABNORMAL
EOSINOPHILS ABSOLUTE: 0.3 K/CU MM
EOSINOPHILS RELATIVE PERCENT: 4.7 % (ref 0–3)
ESTIMATED AVERAGE GLUCOSE: 105 MG/DL
GFR, ESTIMATED: 86 ML/MIN/1.73M2
GLUCOSE SERPL-MCNC: 82 MG/DL (ref 70–99)
HBA1C MFR BLD: 5.3 % (ref 4.2–6.3)
HCT VFR BLD CALC: 43.3 % (ref 42–52)
HDLC SERPL-MCNC: 44 MG/DL
HEMOGLOBIN: 14.8 GM/DL (ref 13.5–18)
IMMATURE NEUTROPHIL %: 0.3 % (ref 0–0.43)
LDLC SERPL CALC-MCNC: 37 MG/DL
LYMPHOCYTES ABSOLUTE: 3 K/CU MM
LYMPHOCYTES RELATIVE PERCENT: 42.7 % (ref 24–44)
MCH RBC QN AUTO: 33.5 PG (ref 27–31)
MCHC RBC AUTO-ENTMCNC: 34.2 % (ref 32–36)
MCV RBC AUTO: 98 FL (ref 78–100)
MONOCYTES ABSOLUTE: 0.7 K/CU MM
MONOCYTES RELATIVE PERCENT: 9.3 % (ref 0–4)
NEUTROPHILS ABSOLUTE: 2.9 K/CU MM
NEUTROPHILS RELATIVE PERCENT: 41.7 % (ref 36–66)
NUCLEATED RBC %: 0 %
PDW BLD-RTO: 12 % (ref 11.7–14.9)
PLATELET # BLD: 236 K/CU MM (ref 140–440)
PMV BLD AUTO: 9.6 FL (ref 7.5–11.1)
POTASSIUM SERPL-SCNC: 4.7 MMOL/L (ref 3.5–5.1)
RBC # BLD: 4.42 M/CU MM (ref 4.6–6.2)
SODIUM BLD-SCNC: 142 MMOL/L (ref 135–145)
TOTAL CK: 126 IU/L (ref 38–174)
TOTAL IMMATURE NEUTOROPHIL: 0.02 K/CU MM
TOTAL NUCLEATED RBC: 0 K/CU MM
TOTAL PROTEIN: 7.1 GM/DL (ref 6.4–8.2)
TRIGL SERPL-MCNC: 143 MG/DL
WBC # BLD: 7 K/CU MM (ref 4–10.5)

## 2024-05-29 PROCEDURE — 80053 COMPREHEN METABOLIC PANEL: CPT

## 2024-05-29 PROCEDURE — 80061 LIPID PANEL: CPT

## 2024-05-29 PROCEDURE — 83036 HEMOGLOBIN GLYCOSYLATED A1C: CPT

## 2024-05-29 PROCEDURE — 36415 COLL VENOUS BLD VENIPUNCTURE: CPT

## 2024-05-29 PROCEDURE — 82550 ASSAY OF CK (CPK): CPT

## 2024-05-29 PROCEDURE — 85025 COMPLETE CBC W/AUTO DIFF WBC: CPT

## 2024-06-17 DIAGNOSIS — G40.919 BREAKTHROUGH SEIZURE (HCC): ICD-10-CM

## 2024-06-17 NOTE — TELEPHONE ENCOUNTER
Requested Prescriptions     Pending Prescriptions Disp Refills    lamoTRIgine (LAMICTAL) 150 MG tablet [Pharmacy Med Name: LAMOTRIGINE 150 MG TABS 150 Tablet] 62 tablet 5     Sig: TAKE 1 TABLET BY MOUTH 2 TIMES DAILY

## 2024-06-19 RX ORDER — LAMOTRIGINE 150 MG/1
150 TABLET ORAL 2 TIMES DAILY
Qty: 62 TABLET | Refills: 5 | Status: SHIPPED | OUTPATIENT
Start: 2024-06-19

## 2024-07-15 DIAGNOSIS — S06.9X9D TRAUMATIC BRAIN INJURY WITH LOSS OF CONSCIOUSNESS, SUBSEQUENT ENCOUNTER: ICD-10-CM

## 2024-07-15 DIAGNOSIS — G47.00 INSOMNIA, UNSPECIFIED TYPE: ICD-10-CM

## 2024-07-15 DIAGNOSIS — G40.919 BREAKTHROUGH SEIZURE (HCC): ICD-10-CM

## 2024-07-16 RX ORDER — LANOLIN ALCOHOL/MO/W.PET/CERES
1 CREAM (GRAM) TOPICAL NIGHTLY
Qty: 31 TABLET | Refills: 3 | Status: SHIPPED | OUTPATIENT
Start: 2024-07-16

## 2024-07-16 RX ORDER — CLONAZEPAM 0.5 MG/1
TABLET ORAL
Qty: 93 TABLET | Refills: 1 | Status: SHIPPED | OUTPATIENT
Start: 2024-07-16 | End: 2025-01-17

## 2024-07-16 NOTE — TELEPHONE ENCOUNTER
Last ov 4/24/24, next ov 10/16/24. Rx pending.     Requested Prescriptions     Pending Prescriptions Disp Refills    melatonin 3 MG TABS tablet [Pharmacy Med Name: MELATONIN 3 MG TABLET 3 Tablet] 31 tablet 3     Sig: TAKE 1 TABLET BY MOUTH EVERY DAY AT BEDTIME    clonazePAM (KLONOPIN) 0.5 MG tablet [Pharmacy Med Name: CLONAZEPAM 0.5 MG TABS 0.5 Tablet] 93 tablet 1     Sig: TAKE 1 TABLET BY MOUTH THREE TIMES A DAY

## 2024-08-15 DIAGNOSIS — G40.919 BREAKTHROUGH SEIZURE (HCC): ICD-10-CM

## 2024-08-15 DIAGNOSIS — S06.9X9D TRAUMATIC BRAIN INJURY WITH LOSS OF CONSCIOUSNESS, SUBSEQUENT ENCOUNTER: ICD-10-CM

## 2024-08-16 NOTE — TELEPHONE ENCOUNTER
Last Visit: 4/24/24    Next Visit: 10/16/24    Rx is pending.     Requested Prescriptions     Pending Prescriptions Disp Refills    phenytoin (DILANTIN) 100 MG ER capsule [Pharmacy Med Name: PHENYTOIN 100MG (DILANTIN) 100 Capsule] 120 capsule 3     Sig: TAKE 4 CAPSULES BY MOUTH EVERY MORNING

## 2024-08-19 RX ORDER — PHENYTOIN SODIUM 100 MG/1
400 CAPSULE, EXTENDED RELEASE ORAL EVERY MORNING
Qty: 120 CAPSULE | Refills: 3 | Status: SHIPPED | OUTPATIENT
Start: 2024-08-19

## 2024-08-29 ENCOUNTER — HOSPITAL ENCOUNTER (OUTPATIENT)
Age: 59
Setting detail: SPECIMEN
Discharge: HOME | End: 2024-08-29
Payer: MEDICARE

## 2024-08-29 LAB
ESTIMATED AVERAGE GLUCOSE: 100 MG/DL
HBA1C MFR BLD: 5.1 % (ref 4.2–6.3)

## 2024-08-29 PROCEDURE — 80061 LIPID PANEL: CPT

## 2024-08-29 PROCEDURE — 82550 ASSAY OF CK (CPK): CPT

## 2024-08-29 PROCEDURE — 85025 COMPLETE CBC W/AUTO DIFF WBC: CPT

## 2024-08-29 PROCEDURE — 36415 COLL VENOUS BLD VENIPUNCTURE: CPT

## 2024-08-29 PROCEDURE — 80053 COMPREHEN METABOLIC PANEL: CPT

## 2024-08-29 PROCEDURE — 83036 HEMOGLOBIN GLYCOSYLATED A1C: CPT

## 2024-08-30 LAB
ALBUMIN SERPL-MCNC: 4.4 GM/DL (ref 3.4–5)
ALP BLD-CCNC: 63 IU/L (ref 40–128)
ALT SERPL-CCNC: 14 U/L (ref 10–40)
ANION GAP SERPL CALCULATED.3IONS-SCNC: 20 MMOL/L (ref 7–16)
AST SERPL-CCNC: 18 IU/L (ref 15–37)
BILIRUB SERPL-MCNC: 0.3 MG/DL (ref 0–1)
BUN SERPL-MCNC: 20 MG/DL (ref 6–23)
CALCIUM SERPL-MCNC: 9.4 MG/DL (ref 8.3–10.6)
CHLORIDE BLD-SCNC: 101 MMOL/L (ref 99–110)
CHOLEST SERPL-MCNC: 139 MG/DL
CO2: 19 MMOL/L (ref 21–32)
CREAT SERPL-MCNC: 1.1 MG/DL (ref 0.9–1.3)
GFR, ESTIMATED: 77 ML/MIN/1.73M2
GLUCOSE SERPL-MCNC: 135 MG/DL (ref 70–99)
HDLC SERPL-MCNC: 46 MG/DL
LDLC SERPL CALC-MCNC: 56 MG/DL
POTASSIUM SERPL-SCNC: 3.7 MMOL/L (ref 3.5–5.1)
SODIUM BLD-SCNC: 140 MMOL/L (ref 135–145)
TOTAL CK: 88 IU/L (ref 38–174)
TOTAL PROTEIN: 7.2 GM/DL (ref 6.4–8.2)
TRIGL SERPL-MCNC: 185 MG/DL

## 2024-08-31 LAB
BASOPHILS ABSOLUTE: 0.1 K/CU MM
BASOPHILS RELATIVE PERCENT: 0.9 % (ref 0–1)
DIFFERENTIAL TYPE: ABNORMAL
EOSINOPHILS ABSOLUTE: 0.4 K/CU MM
EOSINOPHILS RELATIVE PERCENT: 6 % (ref 0–3)
HCT VFR BLD CALC: 44.9 % (ref 42–52)
HEMOGLOBIN: 15.7 GM/DL (ref 13.5–18)
IMMATURE NEUTROPHIL %: 0.3 % (ref 0–0.43)
LYMPHOCYTES ABSOLUTE: 2.6 K/CU MM
LYMPHOCYTES RELATIVE PERCENT: 40.3 % (ref 24–44)
MCH RBC QN AUTO: 33.8 PG (ref 27–31)
MCHC RBC AUTO-ENTMCNC: 35 % (ref 32–36)
MCV RBC AUTO: 96.6 FL (ref 78–100)
MONOCYTES ABSOLUTE: 0.4 K/CU MM
MONOCYTES RELATIVE PERCENT: 6.8 % (ref 0–4)
NEUTROPHILS ABSOLUTE: 2.9 K/CU MM
NEUTROPHILS RELATIVE PERCENT: 45.7 % (ref 36–66)
NUCLEATED RBC %: 0 %
PDW BLD-RTO: 11.5 % (ref 11.7–14.9)
PLATELET # BLD: 238 K/CU MM (ref 140–440)
PMV BLD AUTO: 9.1 FL (ref 7.5–11.1)
RBC # BLD: 4.65 M/CU MM (ref 4.6–6.2)
TOTAL IMMATURE NEUTOROPHIL: 0.02 K/CU MM
TOTAL NUCLEATED RBC: 0 K/CU MM
WBC # BLD: 6.3 K/CU MM (ref 4–10.5)

## 2024-09-13 DIAGNOSIS — G40.919 BREAKTHROUGH SEIZURE (HCC): ICD-10-CM

## 2024-09-13 DIAGNOSIS — S06.9X9D TRAUMATIC BRAIN INJURY WITH LOSS OF CONSCIOUSNESS, SUBSEQUENT ENCOUNTER: ICD-10-CM

## 2024-09-16 RX ORDER — CLONAZEPAM 0.5 MG/1
TABLET ORAL
Qty: 93 TABLET | Refills: 1 | Status: SHIPPED | OUTPATIENT
Start: 2024-09-16 | End: 2025-03-17

## 2024-10-16 ENCOUNTER — OFFICE VISIT (OUTPATIENT)
Dept: NEUROLOGY | Age: 59
End: 2024-10-16
Payer: MEDICARE

## 2024-10-16 VITALS
HEART RATE: 73 BPM | OXYGEN SATURATION: 97 % | BODY MASS INDEX: 33.09 KG/M2 | DIASTOLIC BLOOD PRESSURE: 72 MMHG | SYSTOLIC BLOOD PRESSURE: 120 MMHG | WEIGHT: 230.6 LBS

## 2024-10-16 DIAGNOSIS — G40.919 BREAKTHROUGH SEIZURE (HCC): ICD-10-CM

## 2024-10-16 DIAGNOSIS — S06.9X9D TRAUMATIC BRAIN INJURY WITH LOSS OF CONSCIOUSNESS, SUBSEQUENT ENCOUNTER: ICD-10-CM

## 2024-10-16 PROCEDURE — 99213 OFFICE O/P EST LOW 20 MIN: CPT | Performed by: NURSE PRACTITIONER

## 2024-10-16 PROCEDURE — 1036F TOBACCO NON-USER: CPT | Performed by: NURSE PRACTITIONER

## 2024-10-16 PROCEDURE — 3017F COLORECTAL CA SCREEN DOC REV: CPT | Performed by: NURSE PRACTITIONER

## 2024-10-16 PROCEDURE — G8427 DOCREV CUR MEDS BY ELIG CLIN: HCPCS | Performed by: NURSE PRACTITIONER

## 2024-10-16 PROCEDURE — G8484 FLU IMMUNIZE NO ADMIN: HCPCS | Performed by: NURSE PRACTITIONER

## 2024-10-16 PROCEDURE — G8417 CALC BMI ABV UP PARAM F/U: HCPCS | Performed by: NURSE PRACTITIONER

## 2024-10-16 RX ORDER — CLONAZEPAM 0.5 MG/1
TABLET ORAL
Qty: 90 TABLET | Refills: 1 | Status: SHIPPED | OUTPATIENT
Start: 2024-10-16 | End: 2025-04-16

## 2024-10-16 RX ORDER — LAMOTRIGINE 150 MG/1
150 TABLET ORAL 2 TIMES DAILY
Qty: 180 TABLET | Refills: 3 | Status: SHIPPED | OUTPATIENT
Start: 2024-10-16

## 2024-10-16 RX ORDER — PHENYTOIN SODIUM 100 MG/1
400 CAPSULE, EXTENDED RELEASE ORAL EVERY MORNING
Qty: 120 CAPSULE | Refills: 5 | Status: SHIPPED | OUTPATIENT
Start: 2024-10-16

## 2024-10-16 NOTE — PROGRESS NOTES
10/16/24    Simba Duran  1965    Chief Complaint   Patient presents with    Follow-up     6M follow up for  seizure, TBI         History of Present Illness  Simba is a 59 y.o. male presenting today for follow-up of: TBI, seizure disorder.  His last breakthrough seizure was January 2024.  His Lamictal was increased to 150 mg twice daily at that time and last follow-up he had not had any further breakthrough seizure activity.  He continues Dilantin 400 mg daily and clonazepam 1 tablet 3 times daily.     He did quit smoking.    CBC/CMP on 8/29/2024 looked okay.      Current Outpatient Medications   Medication Sig Dispense Refill    clonazePAM (KLONOPIN) 0.5 MG tablet TAKE 1 TABLET BY MOUTH THREE TIMES A DAY 90 tablet 1    lamoTRIgine (LAMICTAL) 150 MG tablet Take 1 tablet by mouth 2 times daily 180 tablet 3    phenytoin (DILANTIN) 100 MG ER capsule Take 4 capsules by mouth every morning 120 capsule 5    melatonin 3 MG TABS tablet TAKE 1 TABLET BY MOUTH EVERY DAY AT BEDTIME 31 tablet 3    loratadine (CLARITIN) 10 MG tablet Take 1 tablet by mouth daily      hydroCHLOROthiazide 12.5 MG tablet Take 1 tablet by mouth every morning      fenofibrate (TRIGLIDE) 160 MG tablet Take 1 tablet by mouth daily      finasteride (PROSCAR) 5 MG tablet TAKE 1 TABLET BY MOUTH DAILY      atorvastatin (LIPITOR) 10 MG tablet Take 2 tablets by mouth daily      XARELTO 20 MG TABS tablet TAKE 1 TABLET BY MOUTH DAILY      risperiDONE (RISPERDAL) 1 MG tablet Take 0.5 tablets by mouth nightly      omeprazole (PRILOSEC) 20 MG delayed release capsule Take 1 capsule by mouth daily      fluvoxaMINE (LUVOX) 100 MG tablet Take 1 tablet by mouth in the morning, at noon, and at bedtime      Multiple Vitamins-Minerals (THERAPEUTIC MULTIVITAMIN-MINERALS) tablet Take 1 tablet by mouth daily      busPIRone (BUSPAR) 30 MG tablet Take 30 mg by mouth in the morning and 30 mg in the evening.       No current facility-administered medications for this

## 2024-11-15 DIAGNOSIS — G40.919 BREAKTHROUGH SEIZURE (HCC): ICD-10-CM

## 2024-11-15 DIAGNOSIS — G47.00 INSOMNIA, UNSPECIFIED TYPE: ICD-10-CM

## 2024-11-15 DIAGNOSIS — S06.9X9D TRAUMATIC BRAIN INJURY WITH LOSS OF CONSCIOUSNESS, SUBSEQUENT ENCOUNTER: ICD-10-CM

## 2024-11-15 NOTE — TELEPHONE ENCOUNTER
Pharmacy requesting refills on melatonin and clonazepam. Last visit was 10/16/2024, expected to follow up 10/2025 for 1 year follow up.

## 2024-11-18 RX ORDER — CLONAZEPAM 0.5 MG/1
TABLET ORAL
Qty: 93 TABLET | Refills: 1 | Status: SHIPPED | OUTPATIENT
Start: 2024-11-18 | End: 2025-05-16

## 2024-12-11 ENCOUNTER — HOSPITAL ENCOUNTER (OUTPATIENT)
Age: 59
Setting detail: SPECIMEN
Discharge: HOME | End: 2024-12-11
Payer: MEDICARE

## 2024-12-11 LAB
ALBUMIN SERPL-MCNC: 4.1 G/DL (ref 3.4–5)
ALBUMIN/GLOB SERPL: 1.5 {RATIO} (ref 1.1–2.2)
ALP SERPL-CCNC: 63 U/L (ref 40–129)
ALT SERPL-CCNC: 19 U/L (ref 10–40)
ANION GAP SERPL CALCULATED.3IONS-SCNC: 16 MMOL/L (ref 9–17)
AST SERPL-CCNC: 27 U/L (ref 15–37)
BASOPHILS # BLD: 0.07 K/UL
BASOPHILS NFR BLD: 1 % (ref 0–1)
BILIRUB SERPL-MCNC: 0.3 MG/DL (ref 0–1)
BUN SERPL-MCNC: 17 MG/DL (ref 7–20)
CALCIUM SERPL-MCNC: 9.4 MG/DL (ref 8.3–10.6)
CHLORIDE SERPL-SCNC: 104 MMOL/L (ref 99–110)
CHOLEST SERPL-MCNC: 131 MG/DL (ref 125–199)
CK SERPL-CCNC: 112 U/L (ref 26–192)
CO2 SERPL-SCNC: 22 MMOL/L (ref 21–32)
CREAT SERPL-MCNC: 1.1 MG/DL (ref 0.8–1.3)
EOSINOPHIL # BLD: 0.31 K/UL
EOSINOPHILS RELATIVE PERCENT: 5 % (ref 0–3)
ERYTHROCYTE [DISTWIDTH] IN BLOOD BY AUTOMATED COUNT: 11.9 % (ref 11.7–14.9)
EST. AVERAGE GLUCOSE BLD GHB EST-MCNC: 110 MG/DL
GFR, ESTIMATED: 68 ML/MIN/1.73M2
GLUCOSE SERPL-MCNC: 99 MG/DL (ref 74–99)
HBA1C MFR BLD: 5.5 % (ref 4.2–6.3)
HCT VFR BLD AUTO: 44.9 % (ref 42–52)
HDLC SERPL-MCNC: 48 MG/DL
HGB BLD-MCNC: 15.4 G/DL (ref 13.5–18)
IMM GRANULOCYTES # BLD AUTO: 0.01 K/UL
IMM GRANULOCYTES NFR BLD: 0 %
LDLC SERPL CALC-MCNC: 38 MG/DL
LYMPHOCYTES NFR BLD: 2.42 K/UL
LYMPHOCYTES RELATIVE PERCENT: 40 % (ref 24–44)
MCH RBC QN AUTO: 33.6 PG (ref 27–31)
MCHC RBC AUTO-ENTMCNC: 34.3 G/DL (ref 32–36)
MCV RBC AUTO: 97.8 FL (ref 78–100)
MONOCYTES NFR BLD: 0.55 K/UL
MONOCYTES NFR BLD: 9 % (ref 0–4)
NEUTROPHILS NFR BLD: 44 % (ref 36–66)
NEUTS SEG NFR BLD: 2.67 K/UL
PLATELET # BLD AUTO: 248 K/UL (ref 140–440)
PMV BLD AUTO: 9.6 FL (ref 7.5–11.1)
POTASSIUM SERPL-SCNC: 3.9 MMOL/L (ref 3.5–5.1)
PROT SERPL-MCNC: 6.7 G/DL (ref 6.4–8.2)
PSA SERPL-MCNC: 0.36 NG/ML (ref 0–4)
RBC # BLD AUTO: 4.59 M/UL (ref 4.6–6.2)
SODIUM SERPL-SCNC: 142 MMOL/L (ref 136–145)
TRIGL SERPL-MCNC: 226 MG/DL
WBC OTHER # BLD: 6 K/UL (ref 4–10.5)

## 2024-12-11 PROCEDURE — G0103 PSA SCREENING: HCPCS

## 2024-12-11 PROCEDURE — 36415 COLL VENOUS BLD VENIPUNCTURE: CPT

## 2024-12-11 PROCEDURE — 83036 HEMOGLOBIN GLYCOSYLATED A1C: CPT

## 2024-12-11 PROCEDURE — 80053 COMPREHEN METABOLIC PANEL: CPT

## 2024-12-11 PROCEDURE — 82550 ASSAY OF CK (CPK): CPT

## 2024-12-11 PROCEDURE — 85025 COMPLETE CBC W/AUTO DIFF WBC: CPT

## 2024-12-11 PROCEDURE — 80061 LIPID PANEL: CPT

## 2024-12-13 DIAGNOSIS — G40.919 BREAKTHROUGH SEIZURE (HCC): ICD-10-CM

## 2024-12-13 DIAGNOSIS — S06.9X9D TRAUMATIC BRAIN INJURY WITH LOSS OF CONSCIOUSNESS, SUBSEQUENT ENCOUNTER: ICD-10-CM

## 2024-12-13 RX ORDER — PHENYTOIN SODIUM 100 MG/1
400 CAPSULE, EXTENDED RELEASE ORAL EVERY MORNING
Qty: 124 CAPSULE | Refills: 3 | OUTPATIENT
Start: 2024-12-13

## 2024-12-13 RX ORDER — LAMOTRIGINE 150 MG/1
150 TABLET ORAL 2 TIMES DAILY
Qty: 62 TABLET | Refills: 5 | OUTPATIENT
Start: 2024-12-13

## 2024-12-17 ENCOUNTER — HOSPITAL ENCOUNTER (OUTPATIENT)
Age: 59
Setting detail: SPECIMEN
Discharge: HOME | End: 2024-12-17
Payer: MEDICARE

## 2024-12-17 LAB
CREAT UR-MCNC: 161 MG/DL (ref 39–259)
DATE, STOOL #1: NORMAL
HEMOCCULT SP1 STL QL: NEGATIVE
MICROALBUMIN UR-MCNC: <1 MG/L
MICROALBUMIN/CREAT UR-RTO: NORMAL MCG/MG CREAT (ref 0–2)
TIME, STOOL #1: 755

## 2024-12-17 PROCEDURE — 82043 UR ALBUMIN QUANTITATIVE: CPT

## 2024-12-17 PROCEDURE — 82272 OCCULT BLD FECES 1-3 TESTS: CPT

## 2024-12-17 PROCEDURE — 82570 ASSAY OF URINE CREATININE: CPT

## 2024-12-25 ENCOUNTER — APPOINTMENT (OUTPATIENT)
Dept: CT IMAGING | Age: 59
End: 2024-12-25
Payer: MEDICARE

## 2024-12-25 ENCOUNTER — APPOINTMENT (OUTPATIENT)
Dept: GENERAL RADIOLOGY | Age: 59
End: 2024-12-25
Payer: MEDICARE

## 2024-12-25 ENCOUNTER — HOSPITAL ENCOUNTER (EMERGENCY)
Age: 59
Discharge: HOME | End: 2024-12-25
Attending: EMERGENCY MEDICINE
Payer: MEDICARE

## 2024-12-25 VITALS
WEIGHT: 230 LBS | BODY MASS INDEX: 33 KG/M2 | HEART RATE: 86 BPM | RESPIRATION RATE: 21 BRPM | TEMPERATURE: 98.2 F | DIASTOLIC BLOOD PRESSURE: 82 MMHG | SYSTOLIC BLOOD PRESSURE: 115 MMHG | OXYGEN SATURATION: 95 %

## 2024-12-25 DIAGNOSIS — F05 POSTICTAL CONFUSION: Primary | ICD-10-CM

## 2024-12-25 LAB
ALBUMIN SERPL-MCNC: 4 G/DL (ref 3.4–5)
ALBUMIN/GLOB SERPL: 1.6 {RATIO} (ref 1.1–2.2)
ALP SERPL-CCNC: 67 U/L (ref 40–129)
ALT SERPL-CCNC: 18 U/L (ref 10–40)
ANION GAP SERPL CALCULATED.3IONS-SCNC: 11 MMOL/L (ref 9–17)
AST SERPL-CCNC: 34 U/L (ref 15–37)
BASOPHILS # BLD: 0.05 K/UL
BASOPHILS NFR BLD: 1 % (ref 0–1)
BILIRUB SERPL-MCNC: 0.3 MG/DL (ref 0–1)
BILIRUB UR QL STRIP: NEGATIVE
BUN SERPL-MCNC: 19 MG/DL (ref 7–20)
CALCIUM SERPL-MCNC: 9 MG/DL (ref 8.3–10.6)
CASTS #/AREA URNS LPF: ABNORMAL /LPF
CHLORIDE SERPL-SCNC: 104 MMOL/L (ref 99–110)
CHP ED QC CHECK: YES
CLARITY UR: CLEAR
CO2 SERPL-SCNC: 24 MMOL/L (ref 21–32)
COLOR UR: YELLOW
CREAT SERPL-MCNC: 1.1 MG/DL (ref 0.8–1.3)
EKG ATRIAL RATE: 105 BPM
EKG DIAGNOSIS: NORMAL
EKG P AXIS: 47 DEGREES
EKG P-R INTERVAL: 172 MS
EKG Q-T INTERVAL: 330 MS
EKG QRS DURATION: 90 MS
EKG QTC CALCULATION (BAZETT): 436 MS
EKG R AXIS: 7 DEGREES
EKG T AXIS: 53 DEGREES
EKG VENTRICULAR RATE: 105 BPM
EOSINOPHIL # BLD: 0.05 K/UL
EOSINOPHILS RELATIVE PERCENT: 1 % (ref 0–3)
EPI CELLS #/AREA URNS HPF: 1 /HPF
ERYTHROCYTE [DISTWIDTH] IN BLOOD BY AUTOMATED COUNT: 11.8 % (ref 11.7–14.9)
GFR, ESTIMATED: 68 ML/MIN/1.73M2
GLUCOSE BLD-MCNC: 156 MG/DL
GLUCOSE BLD-MCNC: 156 MG/DL (ref 74–99)
GLUCOSE SERPL-MCNC: 112 MG/DL (ref 74–99)
GLUCOSE UR STRIP-MCNC: NEGATIVE MG/DL
HCT VFR BLD AUTO: 43.2 % (ref 42–52)
HGB BLD-MCNC: 15.2 G/DL (ref 13.5–18)
HGB UR QL STRIP.AUTO: ABNORMAL
IMM GRANULOCYTES # BLD AUTO: 0.04 K/UL
IMM GRANULOCYTES NFR BLD: 0 %
KETONES UR STRIP-MCNC: NEGATIVE MG/DL
LEUKOCYTE ESTERASE UR QL STRIP: NEGATIVE
LYMPHOCYTES NFR BLD: 1.51 K/UL
LYMPHOCYTES RELATIVE PERCENT: 16 % (ref 24–44)
MCH RBC QN AUTO: 33.3 PG (ref 27–31)
MCHC RBC AUTO-ENTMCNC: 35.2 G/DL (ref 32–36)
MCV RBC AUTO: 94.5 FL (ref 78–100)
MONOCYTES NFR BLD: 1.31 K/UL
MONOCYTES NFR BLD: 14 % (ref 0–4)
MUCOUS THREADS URNS QL MICRO: ABNORMAL
NEUTROPHILS NFR BLD: 69 % (ref 36–66)
NEUTS SEG NFR BLD: 6.65 K/UL
NITRITE UR QL STRIP: NEGATIVE
PH UR STRIP: 5.5 [PH] (ref 5–8)
PLATELET, FLUORESCENCE: 212 K/UL (ref 140–440)
PMV BLD AUTO: 9.3 FL (ref 7.5–11.1)
POTASSIUM SERPL-SCNC: 3.7 MMOL/L (ref 3.5–5.1)
PROT SERPL-MCNC: 6.5 G/DL (ref 6.4–8.2)
PROT UR STRIP-MCNC: ABNORMAL MG/DL
RBC # BLD AUTO: 4.57 M/UL (ref 4.6–6.2)
RBC #/AREA URNS HPF: 3 /HPF (ref 0–2)
SODIUM SERPL-SCNC: 139 MMOL/L (ref 136–145)
SP GR UR STRIP: 1.02 (ref 1–1.03)
TROPONIN I SERPL HS-MCNC: 157 NG/L (ref 0–22)
TROPONIN I SERPL HS-MCNC: 159 NG/L (ref 0–22)
UROBILINOGEN UR STRIP-ACNC: 0.2 EU/DL (ref 0–1)
WBC #/AREA URNS HPF: 1 /HPF (ref 0–5)
WBC OTHER # BLD: 9.6 K/UL (ref 4–10.5)

## 2024-12-25 PROCEDURE — 70450 CT HEAD/BRAIN W/O DYE: CPT

## 2024-12-25 PROCEDURE — 84484 ASSAY OF TROPONIN QUANT: CPT

## 2024-12-25 PROCEDURE — 85025 COMPLETE CBC W/AUTO DIFF WBC: CPT

## 2024-12-25 PROCEDURE — 71045 X-RAY EXAM CHEST 1 VIEW: CPT

## 2024-12-25 PROCEDURE — 82962 GLUCOSE BLOOD TEST: CPT

## 2024-12-25 PROCEDURE — 93005 ELECTROCARDIOGRAM TRACING: CPT | Performed by: EMERGENCY MEDICINE

## 2024-12-25 PROCEDURE — 93010 ELECTROCARDIOGRAM REPORT: CPT | Performed by: INTERNAL MEDICINE

## 2024-12-25 PROCEDURE — 81001 URINALYSIS AUTO W/SCOPE: CPT

## 2024-12-25 PROCEDURE — 80053 COMPREHEN METABOLIC PANEL: CPT

## 2024-12-25 PROCEDURE — 99285 EMERGENCY DEPT VISIT HI MDM: CPT

## 2024-12-25 PROCEDURE — 72125 CT NECK SPINE W/O DYE: CPT

## 2024-12-25 ASSESSMENT — PAIN DESCRIPTION - LOCATION: LOCATION: HEAD

## 2024-12-25 ASSESSMENT — LIFESTYLE VARIABLES
HOW MANY STANDARD DRINKS CONTAINING ALCOHOL DO YOU HAVE ON A TYPICAL DAY: PATIENT DOES NOT DRINK
HOW OFTEN DO YOU HAVE A DRINK CONTAINING ALCOHOL: NEVER

## 2024-12-25 ASSESSMENT — PAIN SCALES - GENERAL: PAINLEVEL_OUTOF10: 2

## 2024-12-25 ASSESSMENT — PAIN SCALES - WONG BAKER: WONGBAKER_NUMERICALRESPONSE: HURTS A LITTLE BIT

## 2024-12-25 ASSESSMENT — PAIN - FUNCTIONAL ASSESSMENT: PAIN_FUNCTIONAL_ASSESSMENT: WONG-BAKER FACES

## 2024-12-25 ASSESSMENT — PAIN DESCRIPTION - DESCRIPTORS: DESCRIPTORS: DISCOMFORT

## 2024-12-25 NOTE — ED PROVIDER NOTES
156.    One of the patient's caregiver was not there is here now.  She states his staff for was there called her and told her he heard a noise and when he went to patient's room he found the patient snoring and lying on the floor.  She states patient has had similar experience when he had a seizures before and was found on the floor snoring.    CT head, cervical spine and chest x-ray do not reveal acute findings.  Patient remained neurologically intact.    CMP reveals normal results.  UA also reveals normal results.  Initial troponin is bumped at 159.  Repeat troponin is about the same at 157.  Patient has no chest pain or shortness of breath or fatigue or weakness.  He does not admit to shortness of breath.  I do not think this represents acute myocardial infarction or PE.  In fact patient remained asymptomatic and his caregiver is states he appears to be at his baseline.  It appears patient was probably postictal when he was found confused with sonorous breathing.  He remained asymptomatic and alert oriented x 3 throughout his emergency room stay.  Patient is discharged home    Clinical Impression:  1. Postictal confusion      Disposition referral (if applicable):  No follow-up provider specified.  Disposition medications (if applicable):  Discharge Medication List as of 12/25/2024 11:15 AM        ED Provider Disposition Time  DISPOSITION Decision To Discharge 12/25/2024 10:51:12 AM   DISPOSITION CONDITION Stable           Comment: Please note this report has been produced using speech recognition software and may contain errors related to that system including errors in grammar, punctuation, and spelling, as well as words and phrases that may be inappropriate.  Efforts were made to edit the dictations.       Moses Hernandez MD  12/25/24 [id number]

## 2024-12-25 NOTE — ED NOTES
Lab called about patient labs being hemolyzed. Phlebotomy requested to come to bedside for repeat blood draw.

## 2024-12-25 NOTE — DISCHARGE INSTRUCTIONS
Patient is ill-appearing or having shortness of breath or persistent headache or acute confusion return to ER

## 2024-12-25 NOTE — ED NOTES
Patient discharged to home at this time with caregiver. Discharge instructions and follow up care discussed, patient and caregiver voices understanding.

## 2024-12-25 NOTE — ED TRIAGE NOTES
Per EMS patient has hx of seizure, found down at 0515, last seen around 0200, hematomas on head, bs 189.

## 2025-01-15 DIAGNOSIS — S06.9X9D TRAUMATIC BRAIN INJURY WITH LOSS OF CONSCIOUSNESS, SUBSEQUENT ENCOUNTER: ICD-10-CM

## 2025-01-15 DIAGNOSIS — G40.919 BREAKTHROUGH SEIZURE (HCC): ICD-10-CM

## 2025-01-15 NOTE — TELEPHONE ENCOUNTER
Last Visit: 10/16/24    No follow up on file.     Rx is pending.     Requested Prescriptions     Pending Prescriptions Disp Refills    clonazePAM (KLONOPIN) 0.5 MG tablet [Pharmacy Med Name: CLONAZEPAM 0.5 MG TABLET 0.5 Tablet] 84 tablet 1     Sig: TAKE 1 TABLET BY MOUTH THREE TIMES A DAY

## 2025-01-16 RX ORDER — CLONAZEPAM 0.5 MG/1
TABLET ORAL
Qty: 84 TABLET | Refills: 5 | Status: SHIPPED | OUTPATIENT
Start: 2025-01-16 | End: 2025-07-16

## 2025-02-25 ENCOUNTER — HOSPITAL ENCOUNTER (OUTPATIENT)
Age: 60
Setting detail: SPECIMEN
Discharge: HOME | End: 2025-02-25
Payer: MEDICARE

## 2025-02-25 LAB
ALBUMIN SERPL-MCNC: 4.3 G/DL (ref 3.4–5)
ALBUMIN/GLOB SERPL: 1.6 {RATIO} (ref 1.1–2.2)
ALP SERPL-CCNC: 60 U/L (ref 40–129)
ALT SERPL-CCNC: 21 U/L (ref 10–40)
ANION GAP SERPL CALCULATED.3IONS-SCNC: 12 MMOL/L (ref 9–17)
AST SERPL-CCNC: 27 U/L (ref 15–37)
BASOPHILS # BLD: 0.08 K/UL
BASOPHILS NFR BLD: 1 % (ref 0–1)
BILIRUB SERPL-MCNC: 0.3 MG/DL (ref 0–1)
BUN SERPL-MCNC: 19 MG/DL (ref 7–20)
CALCIUM SERPL-MCNC: 9.3 MG/DL (ref 8.3–10.6)
CHLORIDE SERPL-SCNC: 105 MMOL/L (ref 99–110)
CHOLEST SERPL-MCNC: 148 MG/DL (ref 125–199)
CK SERPL-CCNC: 98 U/L (ref 26–192)
CO2 SERPL-SCNC: 25 MMOL/L (ref 21–32)
CREAT SERPL-MCNC: 1 MG/DL (ref 0.8–1.3)
EOSINOPHIL # BLD: 0.29 K/UL
EOSINOPHILS RELATIVE PERCENT: 5 % (ref 0–3)
ERYTHROCYTE [DISTWIDTH] IN BLOOD BY AUTOMATED COUNT: 11.9 % (ref 11.7–14.9)
EST. AVERAGE GLUCOSE BLD GHB EST-MCNC: 113 MG/DL
GFR, ESTIMATED: 74 ML/MIN/1.73M2
GLUCOSE SERPL-MCNC: 98 MG/DL (ref 74–99)
HBA1C MFR BLD: 5.6 % (ref 4.2–6.3)
HCT VFR BLD AUTO: 47.3 % (ref 42–52)
HDLC SERPL-MCNC: 50 MG/DL
HGB BLD-MCNC: 16 G/DL (ref 13.5–18)
IMM GRANULOCYTES # BLD AUTO: 0.01 K/UL
IMM GRANULOCYTES NFR BLD: 0 %
LDLC SERPL CALC-MCNC: 50 MG/DL
LYMPHOCYTES NFR BLD: 2.47 K/UL
LYMPHOCYTES RELATIVE PERCENT: 40 % (ref 24–44)
MCH RBC QN AUTO: 32.5 PG (ref 27–31)
MCHC RBC AUTO-ENTMCNC: 33.8 G/DL (ref 32–36)
MCV RBC AUTO: 96.1 FL (ref 78–100)
MONOCYTES NFR BLD: 0.52 K/UL
MONOCYTES NFR BLD: 8 % (ref 0–4)
NEUTROPHILS NFR BLD: 46 % (ref 36–66)
NEUTS SEG NFR BLD: 2.88 K/UL
PLATELET # BLD AUTO: 252 K/UL (ref 140–440)
PMV BLD AUTO: 9.3 FL (ref 7.5–11.1)
POTASSIUM SERPL-SCNC: 3.8 MMOL/L (ref 3.5–5.1)
PROT SERPL-MCNC: 6.9 G/DL (ref 6.4–8.2)
PSA SERPL-MCNC: 0.34 NG/ML (ref 0–4)
RBC # BLD AUTO: 4.92 M/UL (ref 4.6–6.2)
SODIUM SERPL-SCNC: 142 MMOL/L (ref 136–145)
TRIGL SERPL-MCNC: 240 MG/DL
WBC OTHER # BLD: 6.3 K/UL (ref 4–10.5)

## 2025-02-25 PROCEDURE — 83036 HEMOGLOBIN GLYCOSYLATED A1C: CPT

## 2025-02-25 PROCEDURE — G0103 PSA SCREENING: HCPCS

## 2025-02-25 PROCEDURE — 85025 COMPLETE CBC W/AUTO DIFF WBC: CPT

## 2025-02-25 PROCEDURE — 82550 ASSAY OF CK (CPK): CPT

## 2025-02-25 PROCEDURE — 80061 LIPID PANEL: CPT

## 2025-02-25 PROCEDURE — 80053 COMPREHEN METABOLIC PANEL: CPT

## 2025-02-28 ENCOUNTER — HOSPITAL ENCOUNTER (OUTPATIENT)
Age: 60
Setting detail: SPECIMEN
Discharge: HOME | End: 2025-02-28
Payer: MEDICARE

## 2025-02-28 LAB
CREAT UR-MCNC: 197 MG/DL (ref 39–259)
MICROALBUMIN UR-MCNC: <1 MG/L
MICROALBUMIN/CREAT UR-RTO: NORMAL MCG/MG CREAT (ref 0–2)

## 2025-02-28 PROCEDURE — 82043 UR ALBUMIN QUANTITATIVE: CPT

## 2025-02-28 PROCEDURE — 82570 ASSAY OF URINE CREATININE: CPT

## 2025-03-12 DIAGNOSIS — G47.00 INSOMNIA, UNSPECIFIED TYPE: ICD-10-CM

## 2025-03-12 NOTE — TELEPHONE ENCOUNTER
Patient's pharmacy requesting refills on Melatonin, patient last seen 10/16/2024, is expected to follow up around 10/16/2025.

## 2025-03-17 ENCOUNTER — HOSPITAL ENCOUNTER (OUTPATIENT)
Age: 60
Setting detail: SPECIMEN
Discharge: HOME | End: 2025-03-17
Payer: MEDICARE

## 2025-03-17 LAB
DATE, STOOL #1: NORMAL
HEMOCCULT SP1 STL QL: NEGATIVE
TIME, STOOL #1: 800

## 2025-03-17 PROCEDURE — 82272 OCCULT BLD FECES 1-3 TESTS: CPT

## 2025-03-21 ENCOUNTER — TRANSCRIBE ORDERS (OUTPATIENT)
Dept: ADMINISTRATIVE | Age: 60
End: 2025-03-21

## 2025-03-21 DIAGNOSIS — R10.11 RIGHT UPPER QUADRANT PAIN: Primary | ICD-10-CM

## 2025-03-21 DIAGNOSIS — R10.31 RIGHT LOWER QUADRANT PAIN: ICD-10-CM

## 2025-04-01 DIAGNOSIS — S06.9X9D TRAUMATIC BRAIN INJURY WITH LOSS OF CONSCIOUSNESS, SUBSEQUENT ENCOUNTER: ICD-10-CM

## 2025-04-01 DIAGNOSIS — G40.919 BREAKTHROUGH SEIZURE (HCC): ICD-10-CM

## 2025-04-01 NOTE — TELEPHONE ENCOUNTER
Patient's pharmacy requesting change in quantity in clonazepam, last script was written for 84 tablets and they want it changed to 93 tablets.

## 2025-04-02 RX ORDER — CLONAZEPAM 0.5 MG/1
0.5 TABLET ORAL 3 TIMES DAILY
Qty: 90 TABLET | Refills: 4 | Status: SHIPPED | OUTPATIENT
Start: 2025-04-02 | End: 2025-10-02

## 2025-04-04 ENCOUNTER — HOSPITAL ENCOUNTER (OUTPATIENT)
Age: 60
Setting detail: SPECIMEN
Discharge: HOME | End: 2025-04-04
Payer: MEDICARE

## 2025-04-04 LAB — PSA SERPL-MCNC: 0.39 NG/ML (ref 0–4)

## 2025-04-04 PROCEDURE — G0103 PSA SCREENING: HCPCS

## 2025-04-17 ENCOUNTER — HOSPITAL ENCOUNTER (OUTPATIENT)
Dept: CT IMAGING | Age: 60
Discharge: HOME | End: 2025-04-17
Payer: MEDICARE

## 2025-04-17 DIAGNOSIS — R10.31 RIGHT LOWER QUADRANT PAIN: ICD-10-CM

## 2025-04-17 PROCEDURE — 6360000004 HC RX CONTRAST MEDICATION: Performed by: INTERNAL MEDICINE

## 2025-04-17 PROCEDURE — 74177 CT ABD & PELVIS W/CONTRAST: CPT

## 2025-04-17 RX ORDER — IOPAMIDOL 755 MG/ML
75 INJECTION, SOLUTION INTRAVASCULAR
Status: COMPLETED | OUTPATIENT
Start: 2025-04-17 | End: 2025-04-17

## 2025-04-17 RX ADMIN — IOPAMIDOL 75 ML: 755 INJECTION, SOLUTION INTRAVENOUS at 08:23

## 2025-05-29 ENCOUNTER — HOSPITAL ENCOUNTER (OUTPATIENT)
Age: 60
Setting detail: SPECIMEN
Discharge: HOME | End: 2025-05-29
Payer: MEDICARE

## 2025-05-29 LAB
ALBUMIN SERPL-MCNC: 4.4 G/DL (ref 3.4–5)
ALBUMIN/GLOB SERPL: 1.7 {RATIO} (ref 1.1–2.2)
ALP SERPL-CCNC: 66 U/L (ref 40–129)
ALT SERPL-CCNC: 21 U/L (ref 10–40)
ANION GAP SERPL CALCULATED.3IONS-SCNC: 13 MMOL/L (ref 9–17)
AST SERPL-CCNC: 23 U/L (ref 15–37)
BASOPHILS # BLD: 0.09 K/UL
BASOPHILS NFR BLD: 1 % (ref 0–1)
BILIRUB SERPL-MCNC: 0.3 MG/DL (ref 0–1)
BUN SERPL-MCNC: 19 MG/DL (ref 7–20)
CALCIUM SERPL-MCNC: 9.6 MG/DL (ref 8.3–10.6)
CHLORIDE SERPL-SCNC: 104 MMOL/L (ref 99–110)
CHOLEST SERPL-MCNC: 147 MG/DL (ref 125–199)
CK SERPL-CCNC: 94 U/L (ref 26–192)
CO2 SERPL-SCNC: 25 MMOL/L (ref 21–32)
CREAT SERPL-MCNC: 1 MG/DL (ref 0.8–1.3)
EOSINOPHIL # BLD: 0.35 K/UL
EOSINOPHILS RELATIVE PERCENT: 5 % (ref 0–3)
ERYTHROCYTE [DISTWIDTH] IN BLOOD BY AUTOMATED COUNT: 12 % (ref 11.7–14.9)
GFR, ESTIMATED: 75 ML/MIN/1.73M2
GLUCOSE SERPL-MCNC: 96 MG/DL (ref 74–99)
HCT VFR BLD AUTO: 46.1 % (ref 42–52)
HDLC SERPL-MCNC: 49 MG/DL
HGB BLD-MCNC: 15.9 G/DL (ref 13.5–18)
IMM GRANULOCYTES # BLD AUTO: 0.02 K/UL
IMM GRANULOCYTES NFR BLD: 0 %
LDLC SERPL CALC-MCNC: 44 MG/DL
LYMPHOCYTES NFR BLD: 2.54 K/UL
LYMPHOCYTES RELATIVE PERCENT: 37 % (ref 24–44)
MCH RBC QN AUTO: 32.9 PG (ref 27–31)
MCHC RBC AUTO-ENTMCNC: 34.5 G/DL (ref 32–36)
MCV RBC AUTO: 95.2 FL (ref 78–100)
MONOCYTES NFR BLD: 0.69 K/UL
MONOCYTES NFR BLD: 10 % (ref 0–5)
NEUTROPHILS NFR BLD: 46 % (ref 36–66)
NEUTS SEG NFR BLD: 3.15 K/UL
PLATELET # BLD AUTO: 242 K/UL (ref 140–440)
PMV BLD AUTO: 9.3 FL (ref 7.5–11.1)
POTASSIUM SERPL-SCNC: 3.9 MMOL/L (ref 3.5–5.1)
PROT SERPL-MCNC: 7 G/DL (ref 6.4–8.2)
RBC # BLD AUTO: 4.84 M/UL (ref 4.6–6.2)
SODIUM SERPL-SCNC: 142 MMOL/L (ref 136–145)
TRIGL SERPL-MCNC: 271 MG/DL
WBC OTHER # BLD: 6.8 K/UL (ref 4–10.5)

## 2025-05-29 PROCEDURE — 85025 COMPLETE CBC W/AUTO DIFF WBC: CPT

## 2025-05-29 PROCEDURE — 82550 ASSAY OF CK (CPK): CPT

## 2025-05-29 PROCEDURE — 80053 COMPREHEN METABOLIC PANEL: CPT

## 2025-05-29 PROCEDURE — 80061 LIPID PANEL: CPT
